# Patient Record
Sex: FEMALE | Race: BLACK OR AFRICAN AMERICAN | Employment: UNEMPLOYED | URBAN - METROPOLITAN AREA
[De-identification: names, ages, dates, MRNs, and addresses within clinical notes are randomized per-mention and may not be internally consistent; named-entity substitution may affect disease eponyms.]

---

## 2019-10-02 ENCOUNTER — OFFICE VISIT (OUTPATIENT)
Dept: FAMILY MEDICINE CLINIC | Age: 54
End: 2019-10-02

## 2019-10-02 ENCOUNTER — HOSPITAL ENCOUNTER (OUTPATIENT)
Dept: LAB | Age: 54
Discharge: HOME | End: 2019-10-02
Payer: MEDICAID

## 2019-10-02 VITALS
HEIGHT: 64 IN | SYSTOLIC BLOOD PRESSURE: 142 MMHG | OXYGEN SATURATION: 97 % | DIASTOLIC BLOOD PRESSURE: 79 MMHG | RESPIRATION RATE: 22 BRPM | BODY MASS INDEX: 40.29 KG/M2 | WEIGHT: 236 LBS | TEMPERATURE: 98.3 F | HEART RATE: 92 BPM

## 2019-10-02 DIAGNOSIS — G89.29 CHRONIC RIGHT SHOULDER PAIN: ICD-10-CM

## 2019-10-02 DIAGNOSIS — Z23 ENCOUNTER FOR IMMUNIZATION: ICD-10-CM

## 2019-10-02 DIAGNOSIS — I10 ESSENTIAL HYPERTENSION: ICD-10-CM

## 2019-10-02 DIAGNOSIS — R10.31 RIGHT LOWER QUADRANT ABDOMINAL PAIN: ICD-10-CM

## 2019-10-02 DIAGNOSIS — J45.20 MILD INTERMITTENT ASTHMA, UNSPECIFIED WHETHER COMPLICATED: ICD-10-CM

## 2019-10-02 DIAGNOSIS — F41.9 ANXIETY AND DEPRESSION: Primary | ICD-10-CM

## 2019-10-02 DIAGNOSIS — F32.A ANXIETY AND DEPRESSION: Primary | ICD-10-CM

## 2019-10-02 DIAGNOSIS — M25.511 CHRONIC RIGHT SHOULDER PAIN: ICD-10-CM

## 2019-10-02 LAB
BILIRUB UR QL STRIP: NEGATIVE
GLUCOSE UR-MCNC: NEGATIVE MG/DL
KETONES P FAST UR STRIP-MCNC: NEGATIVE MG/DL
PH UR STRIP: 6 [PH] (ref 4.6–8)
PROT UR QL STRIP: NEGATIVE
SP GR UR STRIP: 1.02 (ref 1–1.03)
UA UROBILINOGEN AMB POC: NORMAL (ref 0.2–1)
URINALYSIS CLARITY POC: CLEAR
URINALYSIS COLOR POC: YELLOW
URINE BLOOD POC: NORMAL
URINE LEUKOCYTES POC: NEGATIVE
URINE NITRITES POC: NEGATIVE

## 2019-10-02 PROCEDURE — 87086 URINE CULTURE/COLONY COUNT: CPT

## 2019-10-02 RX ORDER — HYDROGEN PEROXIDE 3 %
SOLUTION, NON-ORAL MISCELLANEOUS DAILY
COMMUNITY
End: 2020-01-20 | Stop reason: ALTCHOICE

## 2019-10-02 RX ORDER — SERTRALINE HYDROCHLORIDE 25 MG/1
25 TABLET, FILM COATED ORAL DAILY
Qty: 90 TAB | Refills: 1 | Status: CANCELLED | OUTPATIENT
Start: 2019-10-02

## 2019-10-02 RX ORDER — HYDROCHLOROTHIAZIDE 25 MG/1
25 TABLET ORAL DAILY
Qty: 90 TAB | Refills: 1 | Status: SHIPPED | OUTPATIENT
Start: 2019-10-02 | End: 2020-02-19

## 2019-10-02 RX ORDER — HYDROCHLOROTHIAZIDE 25 MG/1
25 TABLET ORAL DAILY
COMMUNITY
End: 2019-10-02 | Stop reason: SDUPTHER

## 2019-10-02 RX ORDER — ALBUTEROL SULFATE 90 UG/1
2 AEROSOL, METERED RESPIRATORY (INHALATION)
Qty: 3 INHALER | Refills: 2 | Status: SHIPPED | OUTPATIENT
Start: 2019-10-02 | End: 2021-03-16 | Stop reason: SDUPTHER

## 2019-10-02 RX ORDER — DULOXETIN HYDROCHLORIDE 30 MG/1
60 CAPSULE, DELAYED RELEASE ORAL DAILY
Qty: 60 CAP | Refills: 0 | Status: SHIPPED | OUTPATIENT
Start: 2019-10-02 | End: 2019-12-23 | Stop reason: SDUPTHER

## 2019-10-02 RX ORDER — ACETAMINOPHEN 500 MG
TABLET ORAL
COMMUNITY
End: 2020-02-17

## 2019-10-02 RX ORDER — DIPHENHYDRAMINE HCL 25 MG
25 TABLET ORAL
COMMUNITY
End: 2020-02-17

## 2019-10-02 NOTE — PROGRESS NOTES
1. Have you been to the ER, urgent care clinic or hospitalized since your last visit? YES. In ED on 8/1/19 for htn    2. Have you seen or consulted any other health care providers outside of the [address] [place] [address] since your last visit (Include any pap smears or colon screening)? NO      Do you have an Advanced Directive? NO    Would you like information on Advanced Directives? NO  Ankit Stout is a 48 y.o. female who presents for routine immunizations. She denies any symptoms , reactions or allergies that would exclude them from being immunized today. Risks and adverse reactions were discussed and the VIS was given to them. All questions were addressed. She was observed for 10 min post injection. There were no reactions observed.     Rosy Roberts LPN

## 2019-10-02 NOTE — PROGRESS NOTES
[facility]    CC: EOC for chronic disease management    HPI:     Anxiety and Depression:  -Issue for 20 years  -2/2 not being able to work due to pain, marriage issues, and son having legal problems  -Issue has been worsening  -On no psychiatric medicationi  -Not seeing a couselor or therapist  -Associated with crying, loss of interest, worrying, shaking, shortness of breath, and heart racing      Asthma:  -Issue since she was 24years old  -Started when she had her son  -Triggered by cold  -Reports that she rarely needs to use rescue inhaler      Right Shoulder Pain:  -Issue for 4 years  -Was seeing Dr. Tonya Coreas with orthopedic surgery for issue      HTN:  -Currently on no medication for issue  -Was previously on HCTZ  -Denies any side effects from the medication  -Checks BP at home.   No log brought in for review  -Reports SBP was in 139-127 on the medication  -Does not follow a regular exercise regimen  -Has been trying to lower her salt intake      ROS: Positive items marked in RED  CON: fever, chills  Cardiovascular: palpitations, CP  Resp: SOB, cough  GI: nausea, vomiting, diarrhea  : dysuria, hematuria      Past Medical History:   Diagnosis Date    Anxiety and depression     Asthma 11/3/2010    Chronic pain in right shoulder     Depression 10/19/2015    Eczema 10/19/2015    Head pain 4/28/2014    HTN (hypertension)     Syphilis in female 3/2/2015       Past Surgical History:   Procedure Laterality Date    ENDOSCOPY, COLON, DIAGNOSTIC      HX TUBAL LIGATION         Family History   Problem Relation Age of Onset    Cancer Mother     Breast Cancer Maternal Grandmother        Social History     Socioeconomic History    Marital status:      Spouse name: Not on file    Number of children: Not on file    Years of education: Not on file    Highest education level: Not on file   Tobacco Use    Smoking status: Current Every Day Smoker     Packs/day: 0.50     Types: Cigarettes  Smokeless tobacco: Never Used   Substance and Sexual Activity    Alcohol use: Yes    Drug use: No    Sexual activity: Yes       Allergies   Allergen Reactions    Aspirin Nausea and Vomiting    Prednisone Swelling         Current Outpatient Medications:     acetaminophen (TYLENOL EXTRA STRENGTH) 500 mg tablet, Take  by mouth every six (6) hours as needed for Pain., Disp: , Rfl:     esomeprazole (NEXIUM) 20 mg capsule, Take  by mouth daily. , Disp: , Rfl:     diphenhydrAMINE (BENADRYL ALLERGY) 25 mg tablet, Take 25 mg by mouth every six (6) hours as needed for Sleep., Disp: , Rfl:     hydroCHLOROthiazide (HYDRODIURIL) 25 mg tablet, Take 1 Tab by mouth daily. , Disp: 90 Tab, Rfl: 1    albuterol (PROVENTIL HFA, VENTOLIN HFA, PROAIR HFA) 90 mcg/actuation inhaler, Take 2 Puffs by inhalation every six (6) hours as needed for Wheezing., Disp: 3 Inhaler, Rfl: 2    DULoxetine (CYMBALTA) 30 mg capsule, Take 2 Caps by mouth daily. , Disp: 60 Cap, Rfl: 0    ALPRAZolam (XANAX) 0.5 mg tablet, Take 1 Tab by mouth two (2) times daily as needed for Anxiety. Max Daily Amount: 1 mg., Disp: 30 Tab, Rfl: 1    Physical Exam:      /79 (BP 1 Location: Right arm, BP Patient Position: Sitting)   Pulse 92   Temp 98.3 °F (36.8 °C) (Oral)   Resp 22   Ht 5' 4\" (1.626 m)   Wt 236 lb (107 kg)   SpO2 97%   BMI 40.51 kg/m²     General:  Obese habitus, NAD, conversant  Eyes: sclera clear bilaterally, no discharge noted, eyelids normal in appearance  HENT: NCAT  Lungs: CTAB, normal respiratory effort and rate  CV: RRR, no MRGs  ABD: soft, mildly tender to deep palpation in right lower quadrant, non-distended, normal bowel sounds  Skin: normal temperature, turgor, color, and texture  Psych: alert and oriented to person, place and situation, normal affect  Neuro: speech normal, moving all extremities, gait normal    Results for Marge Carrillo (MRN [id number]):   Ref.  Range 10/2/2019 15:54   Color (UA POC) Unknown Yellow Clarity (UA POC) Unknown Clear   Specific gravity (UA POC) Latest Ref Range: 1.001 - 1.035  1.025   pH (UA POC) Latest Ref Range: 4.6 - 8.0  6.0   Protein (UA POC) Latest Ref Range: Negative  Negative   Glucose (UA POC) Latest Ref Range: Negative  Negative   Ketones (UA POC) Latest Ref Range: Negative  Negative   Blood (UA POC) Latest Ref Range: Negative  1+   Bilirubin (UA POC) Latest Ref Range: Negative  Negative   Urobilinogen (UA POC) Latest Ref Range: 0.2 - 1  0.2 mg/dL   Nitrites (UA POC) Latest Ref Range: Negative  Negative   Leukocyte esterase (UA POC) Latest Ref Range: Negative  Negative         Assessment/Plan     Anxiety and Depression, inadequately controlled:  -Started on Cymbalta regimen  -Advised on importance of seeing a counselor/therapist  -Follow-up in 1 month      Hypertension, inadequately controlled:  -Started on HCTZ regimen  -CBC, CMP, and fasting lipid panel ordered  -Follow-up in 1 month      Chronic Right Shoulder Pain, inadequately controlled:  -Started on Cymbalta regimen  -Referred to orthopedist  -Follow-up in 1 month      Mild Intermittent Asthma:  -Will continue current albuterol regimen  -Follow-up in 1 month      Right Lower Quadrant Abdominal Pain:  -Suspect possible cystitis  -Urine culture ordered  -Plan to treat as indicated by culture results  -Follow-up in 1 month      Health Maintenance:  -Influenza vaccine administered at today's visit        Rhina Ramirez MD  10/2/2019, 3:20 PM

## 2019-10-04 LAB
BACTERIA SPEC CULT: NORMAL
SERVICE CMNT-IMP: NORMAL

## 2019-11-05 ENCOUNTER — HOSPITAL ENCOUNTER (OUTPATIENT)
Dept: LAB | Age: 54
Discharge: HOME | End: 2019-11-05
Payer: MEDICAID

## 2019-11-05 DIAGNOSIS — I10 ESSENTIAL HYPERTENSION: ICD-10-CM

## 2019-11-05 LAB
ALBUMIN SERPL-MCNC: 3.7 G/DL (ref 3.4–5)
ALBUMIN/GLOB SERPL: 1 {RATIO} (ref 0.8–1.7)
ALP SERPL-CCNC: 98 U/L (ref 45–117)
ALT SERPL-CCNC: 34 U/L (ref 13–56)
ANION GAP SERPL CALC-SCNC: 7 MMOL/L (ref 3–18)
AST SERPL-CCNC: 22 U/L (ref 10–38)
BILIRUB SERPL-MCNC: 0.3 MG/DL (ref 0.2–1)
BUN SERPL-MCNC: 13 MG/DL (ref 7–18)
BUN/CREAT SERPL: 12 (ref 12–20)
CALCIUM SERPL-MCNC: 9.8 MG/DL (ref 8.5–10.1)
CHLORIDE SERPL-SCNC: 103 MMOL/L (ref 100–111)
CHOLEST SERPL-MCNC: 201 MG/DL
CO2 SERPL-SCNC: 30 MMOL/L (ref 21–32)
CREAT SERPL-MCNC: 1.06 MG/DL (ref 0.6–1.3)
ERYTHROCYTE [DISTWIDTH] IN BLOOD BY AUTOMATED COUNT: 14.4 % (ref 11.6–14.5)
GLOBULIN SER CALC-MCNC: 3.7 G/DL (ref 2–4)
GLUCOSE SERPL-MCNC: 131 MG/DL (ref 74–99)
HCT VFR BLD AUTO: 44.2 % (ref 35–45)
HDLC SERPL-MCNC: 37 MG/DL (ref 40–60)
HDLC SERPL: 5.4 {RATIO} (ref 0–5)
HGB BLD-MCNC: 13.8 G/DL (ref 12–16)
LDLC SERPL CALC-MCNC: 141.4 MG/DL (ref 0–100)
LIPID PROFILE,FLP: ABNORMAL
MCH RBC QN AUTO: 26.5 PG (ref 24–34)
MCHC RBC AUTO-ENTMCNC: 31.2 G/DL (ref 31–37)
MCV RBC AUTO: 85 FL (ref 74–97)
PLATELET # BLD AUTO: 380 K/UL (ref 135–420)
PMV BLD AUTO: 10.1 FL (ref 9.2–11.8)
POTASSIUM SERPL-SCNC: 4.1 MMOL/L (ref 3.5–5.5)
PROT SERPL-MCNC: 7.4 G/DL (ref 6.4–8.2)
RBC # BLD AUTO: 5.2 M/UL (ref 4.2–5.3)
SODIUM SERPL-SCNC: 140 MMOL/L (ref 136–145)
TRIGL SERPL-MCNC: 113 MG/DL (ref ?–150)
VLDLC SERPL CALC-MCNC: 22.6 MG/DL
WBC # BLD AUTO: 13.8 K/UL (ref 4.6–13.2)

## 2019-11-05 PROCEDURE — 36415 COLL VENOUS BLD VENIPUNCTURE: CPT

## 2019-11-05 PROCEDURE — 80061 LIPID PANEL: CPT

## 2019-11-05 PROCEDURE — 80053 COMPREHEN METABOLIC PANEL: CPT

## 2019-11-05 PROCEDURE — 85027 COMPLETE CBC AUTOMATED: CPT

## 2019-11-18 ENCOUNTER — OFFICE VISIT (OUTPATIENT)
Dept: ORTHOPEDIC SURGERY | Facility: CLINIC | Age: 54
End: 2019-11-18

## 2019-11-18 VITALS
HEIGHT: 64 IN | SYSTOLIC BLOOD PRESSURE: 143 MMHG | HEART RATE: 96 BPM | RESPIRATION RATE: 16 BRPM | TEMPERATURE: 98.3 F | WEIGHT: 232 LBS | BODY MASS INDEX: 39.61 KG/M2 | DIASTOLIC BLOOD PRESSURE: 91 MMHG

## 2019-11-18 DIAGNOSIS — M54.2 CERVICAL PAIN: ICD-10-CM

## 2019-11-18 DIAGNOSIS — S43.431A TEAR OF RIGHT GLENOID LABRUM, INITIAL ENCOUNTER: ICD-10-CM

## 2019-11-18 DIAGNOSIS — G89.29 CHRONIC RIGHT SHOULDER PAIN: ICD-10-CM

## 2019-11-18 DIAGNOSIS — M54.12 CERVICAL RADICULOPATHY: Primary | ICD-10-CM

## 2019-11-18 DIAGNOSIS — M25.511 CHRONIC RIGHT SHOULDER PAIN: ICD-10-CM

## 2019-11-18 DIAGNOSIS — E66.01 SEVERE OBESITY (HCC): ICD-10-CM

## 2019-11-18 RX ORDER — BETAMETHASONE SODIUM PHOSPHATE AND BETAMETHASONE ACETATE 3; 3 MG/ML; MG/ML
6 INJECTION, SUSPENSION INTRA-ARTICULAR; INTRALESIONAL; INTRAMUSCULAR; SOFT TISSUE ONCE
Qty: 0.5 ML | Refills: 0
Start: 2019-11-18 | End: 2019-11-18
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
## 2019-12-03 ENCOUNTER — OFFICE VISIT (OUTPATIENT)
Dept: ORTHOPEDIC SURGERY | Facility: CLINIC | Age: 54
End: 2019-12-03

## 2019-12-03 VITALS
SYSTOLIC BLOOD PRESSURE: 125 MMHG | BODY MASS INDEX: 38.93 KG/M2 | DIASTOLIC BLOOD PRESSURE: 95 MMHG | WEIGHT: 228 LBS | TEMPERATURE: 98.4 F | OXYGEN SATURATION: 97 % | HEIGHT: 64 IN | HEART RATE: 101 BPM

## 2019-12-03 DIAGNOSIS — G89.29 CHRONIC RIGHT SHOULDER PAIN: ICD-10-CM

## 2019-12-03 DIAGNOSIS — M25.511 CHRONIC RIGHT SHOULDER PAIN: ICD-10-CM

## 2019-12-03 DIAGNOSIS — M75.101 ROTATOR CUFF SYNDROME, RIGHT: Primary | ICD-10-CM

## 2019-12-03 DIAGNOSIS — M54.2 NECK PAIN: ICD-10-CM

## 2019-12-03 NOTE — PROGRESS NOTES
Patient: Bertha Llanes                MRN: [id number]       SSN: [id number]  YOB: 1965        AGE: 48 y.o. SEX: female  Body mass index is 39.14 kg/m². PCP: Mo Nguyen MD  12/03/19    Chief Complaint: Right shoulder pain    HISTORY OF PRESENT ILLNESS:  Hector Martin is a 48year-old female who presents to the office today with right shoulder pain. She has had pain for several years. She saw Dr. Rosalind Sanchez several weeks ago, who gave her an injection in the shoulder, which did not give her much in the way of pain relief. She had an MRI in 2015, but no recent shoulder MRI. She did have recent shoulder x-rays. The pain is worse at night and worse when trying to raise her arm above shoulder height. Past Medical History:   Diagnosis Date    Anxiety and depression     Asthma 11/3/2010    Chronic pain in right shoulder     Depression 10/19/2015    Eczema 10/19/2015    Head pain 4/28/2014    HTN (hypertension)     Nicotine dependence     Syphilis in female 3/2/2015       Family History   Problem Relation Age of Onset    Breast Cancer Maternal Grandmother     Cancer Mother        Current Outpatient Medications   Medication Sig Dispense Refill    acetaminophen (TYLENOL EXTRA STRENGTH) 500 mg tablet Take  by mouth every six (6) hours as needed for Pain.  esomeprazole (NEXIUM) 20 mg capsule Take  by mouth daily.  diphenhydrAMINE (BENADRYL ALLERGY) 25 mg tablet Take 25 mg by mouth every six (6) hours as needed for Sleep.  hydroCHLOROthiazide (HYDRODIURIL) 25 mg tablet Take 1 Tab by mouth daily. 90 Tab 1    albuterol (PROVENTIL HFA, VENTOLIN HFA, PROAIR HFA) 90 mcg/actuation inhaler Take 2 Puffs by inhalation every six (6) hours as needed for Wheezing. 3 Inhaler 2    DULoxetine (CYMBALTA) 30 mg capsule Take 2 Caps by mouth daily. 60 Cap 0    ALPRAZolam (XANAX) 0.5 mg tablet Take 1 Tab by mouth two (2) times daily as needed for Anxiety.  Max Daily Amount: 1 mg. 30 Tab 1       Allergies   Allergen Reactions    Aspirin Nausea and Vomiting    Prednisone Swelling       Past Surgical History:   Procedure Laterality Date    ENDOSCOPY, COLON, DIAGNOSTIC      HX TUBAL LIGATION Bilateral        Social History     Socioeconomic History    Marital status:      Spouse name: Not on file    Number of children: Not on file    Years of education: Not on file    Highest education level: Not on file   Occupational History    Not on file   Social Needs    Financial resource strain: Not on file    Food insecurity:     Worry: Not on file     Inability: Not on file    Transportation needs:     Medical: Not on file     Non-medical: Not on file   Tobacco Use    Smoking status: Current Every Day Smoker     Packs/day: 0.50     Types: Cigarettes    Smokeless tobacco: Never Used   Substance and Sexual Activity    Alcohol use:  Yes    Drug use: No    Sexual activity: Yes     Partners: Male   Lifestyle    Physical activity:     Days per week: Not on file     Minutes per session: Not on file    Stress: Not on file   Relationships    Social connections:     Talks on phone: Not on file     Gets together: Not on file     Attends Latter day service: Not on file     Active member of club or organization: Not on file     Attends meetings of clubs or organizations: Not on file     Relationship status: Not on file    Intimate partner violence:     Fear of current or ex partner: Not on file     Emotionally abused: Not on file     Physically abused: Not on file     Forced sexual activity: Not on file   Other Topics Concern    Not on file   Social History Narrative    Not on file       REVIEW OF SYSTEMS:      CON: negative for recent weight loss/gain, fever, or chills  EYE: negative for double or blurry vision  ENT: negative for hoarseness  RS:   negative for cough, URI, SOB  CV:  negative for chest pain, palpitations  GI:    negative for blood in stool, nausea/vomiting  :  negative for blood in urine  MS: As per HPI  Other systems reviewed and noted below. PHYSICAL EXAMINATION:  Visit Vitals  BP (!) 125/95   Pulse (!) 101   Temp 98.4 °F (36.9 °C)   Ht 5' 4\" (1.626 m)   Wt 228 lb (103.4 kg)   SpO2 97%   BMI 39.14 kg/m²     Body mass index is 39.14 kg/m². GENERAL: Alert and oriented x3, in no acute distress, well-developed, well-nourished. HEENT: Normocephalic, atraumatic. RESP: Non labored breathing with equal chest rise on inspiration. CV: Well perfused extremities. No cyanosis or clubbing noted. ABDOMEN: Soft, non-tender, non-distended. PHYSICAL EXAM:  Physical exam of the right shoulder with decreased range of motion due to pain with forward flexion. She has pain with supraspinatus, infraspinatus and belly press testing. Mild pain with biceps stress testing. Pain with impingement testing. IMAGING:  X-rays of the right shoulder were reviewed and do not show any obvious bony abnormalities. ASSESSMENT AND PLAN:   Leslie Oneill is a 48year-old female with right shoulder pain. I recommended a new MRI, as her MRI is now almost [de-identified] years old. I ordered that today. I will see her back after that is completed to guide further treatment recommendations.              Electronically signed by: Landen Jackson MD

## 2019-12-23 DIAGNOSIS — F41.9 ANXIETY AND DEPRESSION: ICD-10-CM

## 2019-12-23 DIAGNOSIS — F32.A ANXIETY AND DEPRESSION: ICD-10-CM

## 2019-12-23 DIAGNOSIS — M25.511 CHRONIC RIGHT SHOULDER PAIN: ICD-10-CM

## 2019-12-23 DIAGNOSIS — G89.29 CHRONIC RIGHT SHOULDER PAIN: ICD-10-CM

## 2019-12-24 RX ORDER — DULOXETIN HYDROCHLORIDE 30 MG/1
60 CAPSULE, DELAYED RELEASE ORAL DAILY
Qty: 60 CAP | Refills: 0 | Status: SHIPPED | OUTPATIENT
Start: 2019-12-24 | End: 2020-01-08 | Stop reason: SINTOL

## 2019-12-27 ENCOUNTER — HOSPITAL ENCOUNTER (OUTPATIENT)
Dept: MRI IMAGING | Age: 54
Discharge: HOME | End: 2019-12-27
Attending: ORTHOPAEDIC SURGERY
Payer: MEDICAID

## 2019-12-27 DIAGNOSIS — M75.101 ROTATOR CUFF SYNDROME, RIGHT: ICD-10-CM

## 2019-12-27 PROCEDURE — 73221 MRI JOINT UPR EXTREM W/O DYE: CPT

## 2020-01-08 ENCOUNTER — OFFICE VISIT (OUTPATIENT)
Dept: FAMILY MEDICINE CLINIC | Age: 55
End: 2020-01-08

## 2020-01-08 VITALS
OXYGEN SATURATION: 94 % | HEART RATE: 91 BPM | TEMPERATURE: 96.8 F | BODY MASS INDEX: 38.76 KG/M2 | RESPIRATION RATE: 14 BRPM | WEIGHT: 227 LBS | SYSTOLIC BLOOD PRESSURE: 138 MMHG | HEIGHT: 64 IN | DIASTOLIC BLOOD PRESSURE: 84 MMHG

## 2020-01-08 DIAGNOSIS — F32.A ANXIETY AND DEPRESSION: Primary | ICD-10-CM

## 2020-01-08 DIAGNOSIS — F17.200 NICOTINE DEPENDENCE, UNCOMPLICATED, UNSPECIFIED NICOTINE PRODUCT TYPE: ICD-10-CM

## 2020-01-08 DIAGNOSIS — I10 ESSENTIAL HYPERTENSION WITH GOAL BLOOD PRESSURE LESS THAN 130/80: ICD-10-CM

## 2020-01-08 DIAGNOSIS — F41.9 ANXIETY AND DEPRESSION: Primary | ICD-10-CM

## 2020-01-08 DIAGNOSIS — E78.5 HYPERLIPIDEMIA, UNSPECIFIED HYPERLIPIDEMIA TYPE: ICD-10-CM

## 2020-01-08 DIAGNOSIS — M25.511 CHRONIC RIGHT SHOULDER PAIN: ICD-10-CM

## 2020-01-08 DIAGNOSIS — R73.01 ELEVATED FASTING BLOOD SUGAR: ICD-10-CM

## 2020-01-08 DIAGNOSIS — G89.29 CHRONIC RIGHT SHOULDER PAIN: ICD-10-CM

## 2020-01-08 DIAGNOSIS — L30.4 INTERTRIGO: ICD-10-CM

## 2020-01-08 RX ORDER — BUPROPION HYDROCHLORIDE 150 MG/1
150 TABLET, EXTENDED RELEASE ORAL 2 TIMES DAILY
Qty: 60 TAB | Refills: 5 | Status: SHIPPED | OUTPATIENT
Start: 2020-01-08 | End: 2020-03-04 | Stop reason: SDUPTHER

## 2020-01-08 RX ORDER — PRAVASTATIN SODIUM 40 MG/1
40 TABLET ORAL
Qty: 30 TAB | Refills: 5 | Status: SHIPPED | OUTPATIENT
Start: 2020-01-08 | End: 2020-06-29

## 2020-01-08 RX ORDER — LISINOPRIL 10 MG/1
10 TABLET ORAL DAILY
Qty: 30 TAB | Refills: 5 | Status: SHIPPED | OUTPATIENT
Start: 2020-01-08 | End: 2020-02-07 | Stop reason: SINTOL

## 2020-01-08 RX ORDER — KETOCONAZOLE 20 MG/G
CREAM TOPICAL
Qty: 30 G | Refills: 2 | Status: SHIPPED | OUTPATIENT
Start: 2020-01-08

## 2020-01-08 NOTE — PROGRESS NOTES
[facility]    CC: F/U for chronic disease management    HPI:     Anxiety and Depression:  -Tried Cymbalta and states she is unable to tolerate the medication  -States medication causes her to have sweats, constipation, loss of appetite, and hallucination  -Reports her mood is inadequately controlled  -Reports dealing with a lot of stress that is preventing her from sleeping       Hypertension:  -Got requested lab work  -Taking BP medication as prescribed.    -Denies any issues or side effects with BP medication.  -Has not been checking BP at home  -Diet is unchanged since last visit (Other than eating more fiber and fish)  -Has not been following a regular exercise regimen        Chronic Right Shoulder Pain:  -Saw orthopedic specialist on 11/18/2019  -Was given a steroid injection  -Diagnosed with a tear of right glenoid and was referred for possible surgical intervention  -Saw surgeon 12/3/2019 and a MRI was ordered  -Had MRI done on 12/27/2019  -Next appointment with surgeon is on 1/14/2020      Nicotine Dependence:  -States she is smoking 2/2 stress  -Reports she is currently smoking every day and issue has been worsening  -Currently smokes around 1/2 PPD  -Patient is interested in quitting and would like to try pharmacological therapy to aid in smoking cessation      ROS: Positive items marked in RED  CON: fever, chills  Cardiovascular: palpitations, CP  Resp: SOB, cough  GI: nausea, vomiting, diarrhea  : dysuria, hematuria      Past Medical History:   Diagnosis Date    Anxiety and depression     Asthma 11/3/2010    Chronic pain in right shoulder     Depression 10/19/2015    Eczema 10/19/2015    Head pain 4/28/2014    HTN (hypertension)     Nicotine dependence     Syphilis in female 3/2/2015       Past Surgical History:   Procedure Laterality Date    ENDOSCOPY, COLON, DIAGNOSTIC      HX TUBAL LIGATION Bilateral        Family History   Problem Relation Age of Onset    Breast Cancer Maternal Grandmother     Cancer Mother        Social History     Socioeconomic History    Marital status:      Spouse name: Not on file    Number of children: Not on file    Years of education: Not on file    Highest education level: Not on file   Tobacco Use    Smoking status: Current Every Day Smoker     Packs/day: 0.50     Types: Cigarettes    Smokeless tobacco: Never Used   Substance and Sexual Activity    Alcohol use: Yes    Drug use: No    Sexual activity: Yes     Partners: Male       Allergies   Allergen Reactions    Aspirin Nausea and Vomiting    Prednisone Swelling         Current Outpatient Medications:     DULoxetine (CYMBALTA) 30 mg capsule, Take 2 Caps by mouth daily. , Disp: 60 Cap, Rfl: 0    acetaminophen (TYLENOL EXTRA STRENGTH) 500 mg tablet, Take  by mouth every six (6) hours as needed for Pain., Disp: , Rfl:     esomeprazole (NEXIUM) 20 mg capsule, Take  by mouth daily. , Disp: , Rfl:     diphenhydrAMINE (BENADRYL ALLERGY) 25 mg tablet, Take 25 mg by mouth every six (6) hours as needed for Sleep., Disp: , Rfl:     hydroCHLOROthiazide (HYDRODIURIL) 25 mg tablet, Take 1 Tab by mouth daily. , Disp: 90 Tab, Rfl: 1    albuterol (PROVENTIL HFA, VENTOLIN HFA, PROAIR HFA) 90 mcg/actuation inhaler, Take 2 Puffs by inhalation every six (6) hours as needed for Wheezing., Disp: 3 Inhaler, Rfl: 2    ALPRAZolam (XANAX) 0.5 mg tablet, Take 1 Tab by mouth two (2) times daily as needed for Anxiety.  Max Daily Amount: 1 mg., Disp: 30 Tab, Rfl: 1    Physical Exam:      /84   Pulse 91   Temp 96.8 °F (36 °C) (Oral)   Resp 14   Ht 5' 4\" (1.626 m)   Wt 227 lb (103 kg)   SpO2 94%   BMI 38.96 kg/m²     General: Obese habitus, NAD, conversant  Eyes: sclera clear bilaterally, no discharge noted, eyelids normal in appearance  HENT: NCAT  Lungs: CTAB, normal respiratory effort and rate  CV: RRR, no MRGs  ABD: soft, non-tender, non-distended, normal bowel sounds  Skin: right-sided intertriginous rash noted, normal temperature and turgor. Exam chaperoned by Chelsea Farmer LPN. Psych: alert and oriented to person, place and situation, normal affect  Neuro: speech normal, moving all extremities, gait normal    Results for Dilcia Cruz (MRN [id number]):   Ref. Range 11/5/2019 08:44   WBC Latest Ref Range: 4.6 - 13.2 K/uL 13.8 (H)   RBC Latest Ref Range: 4.20 - 5.30 M/uL 5.20   HGB Latest Ref Range: 12.0 - 16.0 g/dL 13.8   HCT Latest Ref Range: 35.0 - 45.0 % 44.2   MCV Latest Ref Range: 74.0 - 97.0 FL 85.0   MCH Latest Ref Range: 24.0 - 34.0 PG 26.5   MCHC Latest Ref Range: 31.0 - 37.0 g/dL 31.2   RDW Latest Ref Range: 11.6 - 14.5 % 14.4   PLATELET Latest Ref Range: 135 - 420 K/uL 380   MPV Latest Ref Range: 9.2 - 11.8 FL 10.1   Sodium Latest Ref Range: 136 - 145 mmol/L 140   Potassium Latest Ref Range: 3.5 - 5.5 mmol/L 4.1   Chloride Latest Ref Range: 100 - 111 mmol/L 103   CO2 Latest Ref Range: 21 - 32 mmol/L 30   Anion gap Latest Ref Range: 3.0 - 18 mmol/L 7   Glucose Latest Ref Range: 74 - 99 mg/dL 131 (H)   BUN Latest Ref Range: 7.0 - 18 MG/DL 13   Creatinine Latest Ref Range: 0.6 - 1.3 MG/DL 1.06   BUN/Creatinine ratio Latest Ref Range: 12 - 20   12   Calcium Latest Ref Range: 8.5 - 10.1 MG/DL 9.8   GFR est non-AA Latest Ref Range: >60 ml/min/1.73m2 55 (L)   GFR est AA Latest Ref Range: >60 ml/min/1.73m2 >60   Bilirubin, total Latest Ref Range: 0.2 - 1.0 MG/DL 0.3   Protein, total Latest Ref Range: 6.4 - 8.2 g/dL 7.4   Albumin Latest Ref Range: 3.4 - 5.0 g/dL 3.7   Globulin Latest Ref Range: 2.0 - 4.0 g/dL 3.7   A-G Ratio Latest Ref Range: 0.8 - 1.7   1.0   ALT (SGPT) Latest Ref Range: 13 - 56 U/L 34   AST Latest Ref Range: 10 - 38 U/L 22   Alk.  phosphatase Latest Ref Range: 45 - 117 U/L 98   Triglyceride Latest Ref Range: <150 MG/   Cholesterol, total Latest Ref Range: <200 MG/ (H)   HDL Cholesterol Latest Ref Range: 40 - 60 MG/DL 37 (L)   CHOL/HDL Ratio Latest Ref Range: 0 - 5.0   5.4 (H)   VLDL, calculated Latest Units: MG/DL 22.6   LDL, calculated Latest Ref Range: 0 - 100 MG/.4 (H)       Assessment/Plan     Hypertension, inadequately controlled:  -10-year ASCVD risk of 18.4% (higher if she has diabetes)  -Counseled on ASCVD risk and AHA/ACC recommendations  -10 mg of lisinopril daily added to current BP medication regimen  -Urine microalbumin/creatinine ordered  -Follow-up in 1 month      Anxiety and Depression, inadequately controlled:  -Cymbalta officially discontinued  -Started on bupropion regimen  -Follow-up in 1 month      Hyperlipidemia, inadequately controlled:  -10-year ASCVD risk of 18.4% (higher if she has diabetes)  -Counseled on diagnosis, ASCVD risk, and AHA/ACC recommendations  -Started on 40 mg of pravastatin daily, per shared decision making  -Handout given on hyperlipidemia care  -Follow-up in 1 month      Elevated Fasting Blood Sugar:  -FBS in diabetic range  -Patient counseled on finding  -Hemoglobin A1c ordered  -Handout given hyperglycemia care  -Follow-up in 1 month      Nicotine Dependence, worsening:  -Counseled on smoking cessation  -Started on bupropion regimen  -Handout given on smoking cessation  -Follow-up in 1 month      Intertrigo:  -Likely secondary to Candida infection which may been possibly related to suspected diabetes  -Patient counseled on diagnosis  -Started on ketoconazole regimen  -Handout given on Candida dermatitis care  -Follow-up in 1 month      Chronic Right Shoulder Pain:  -Will defer management/evaluation to specialists  -Follow-up in 1 month        Sofía Kaye MD  1/8/2020 8:13 AM

## 2020-01-08 NOTE — PATIENT INSTRUCTIONS
Learning About High Cholesterol  What is high cholesterol? Cholesterol is a type of fat in your blood. It is needed for many body functions, such as making new cells. Cholesterol is made by your body. It also comes from food you eat. If you have too much cholesterol, it starts to build up in your arteries. This is called hardening of the arteries, or atherosclerosis. High cholesterol raises your risk of a heart attack and stroke. There are different types of cholesterol. LDL is the \"bad\" cholesterol. High LDL can raise your risk for heart disease, heart attack, and stroke. HDL is the \"good\" cholesterol. High HDL is linked with a lower risk for heart disease, heart attack, and stroke. Your cholesterol levels help your doctor find out your risk for having a heart attack or stroke. How can you prevent high cholesterol? A heart-healthy lifestyle can help you prevent high cholesterol. This lifestyle helps lower your risk for a heart attack and stroke. · Eat heart-healthy foods. ? Eat fruits, vegetables, whole grains (like oatmeal), dried beans and peas, nuts and seeds, soy products (like tofu), and fat-free or low-fat dairy products. ? Replace butter, margarine, and hydrogenated or partially hydrogenated oils with olive and canola oils. (Canola oil margarine without trans fat is fine.)  ? Replace red meat with fish, poultry, and soy protein (like tofu). ? Limit processed and packaged foods like chips, crackers, and cookies. · Be active. Exercise can improve your cholesterol level. Get at least 30 minutes of exercise on most days of the week. Walking is a good choice. You also may want to do other activities, such as running, swimming, cycling, or playing tennis or team sports. · Stay at a healthy weight. Lose weight if you need to. · Don't smoke. If you need help quitting, talk to your doctor about stop-smoking programs and medicines. These can increase your chances of quitting for good.   How is high cholesterol treated? The goal of treatment is to reduce your chances of having a heart attack or stroke. The goal is not to lower your cholesterol numbers only. · You may make lifestyle changes, such as eating healthy foods, not smoking, losing weight, and being more active. · You may have to take medicine. Follow-up care is a key part of your treatment and safety. Be sure to make and go to all appointments, and call your doctor if you are having problems. It's also a good idea to know your test results and keep a list of the medicines you take. Where can you learn more? Go to http://batsheva-willie.info/. Enter C459 in the search box to learn more about \"Learning About High Cholesterol. \"  Current as of: April 9, 2019  Content Version: 12.2  © 2249-3695 [employer]. Care instructions adapted under license by [employer] (which disclaims liability or warranty for this information). If you have questions about a medical condition or this instruction, always ask your healthcare professional. [address] any warranty or liability for your use of this information. Learning About High Blood Sugar  What is high blood sugar? Your body turns the food you eat into glucose (sugar), which it uses for energy. But if your body isn't able to use the sugar right away, it can build up in your blood and lead to high blood sugar. When the amount of sugar in your blood stays too high for too much of the time, you may have diabetes. Diabetes is a disease that can cause serious health problems. The good news is that lifestyle changes may help you get your blood sugar back to normal and avoid or delay diabetes. What causes high blood sugar? Sugar (glucose) can build up in your blood if you:  · Are overweight. · Have a family history of diabetes. · Take certain medicines, such as steroids. What are the symptoms?   Having high blood sugar may not cause any symptoms at all. Or it may make you feel very thirsty or very hungry. You may also urinate more often than usual, have blurry vision, or lose weight without trying. How is high blood sugar treated? You can take steps to lower your blood sugar level if you understand what makes it get higher. Your doctor may want you to learn how to test your blood sugar level at home. Then you can see how illness, stress, or different kinds of food or medicine raise or lower your blood sugar level. Other tests may be needed to see if you have diabetes. How can you prevent high blood sugar? · Watch your weight. If you're overweight, losing just a small amount of weight may help. Reducing fat around your waist is most important. · Limit the amount of calories, sweets, and unhealthy fat you eat. Ask your doctor if a dietitian can help you. A registered dietitian can help you create meal plans that fit your lifestyle. · Get at least 30 minutes of exercise on most days of the week. Exercise helps control your blood sugar. It also helps you maintain a healthy weight. Walking is a good choice. You also may want to do other activities, such as running, swimming, cycling, or playing tennis or team sports. · If your doctor prescribed medicines, take them exactly as prescribed. Call your doctor if you think you are having a problem with your medicine. You will get more details on the specific medicines your doctor prescribes. Follow-up care is a key part of your treatment and safety. Be sure to make and go to all appointments, and call your doctor if you are having problems. It's also a good idea to know your test results and keep a list of the medicines you take. Where can you learn more? Go to http://batsheva-willie.info/. Enter O108 in the search box to learn more about \"Learning About High Blood Sugar. \"  Current as of: April 16, 2019  Content Version: 12.2  © 2874-2677 [employer], [facility].  Care instructions adapted under license by [employer] (which disclaims liability or warranty for this information). If you have questions about a medical condition or this instruction, always ask your healthcare professional. [address] any warranty or liability for your use of this information. Stopping Smoking: Care Instructions  Your Care Instructions  Cigarette smokers crave the nicotine in cigarettes. Giving it up is much harder than simply changing a habit. Your body has to stop craving the nicotine. It is hard to quit, but you can do it. There are many tools that people use to quit smoking. You may find that combining tools works best for you. There are several steps to quitting. First you get ready to quit. Then you get support to help you. After that, you learn new skills and behaviors to become a nonsmoker. For many people, a necessary step is getting and using medicine. Your doctor will help you set up the plan that best meets your needs. You may want to attend a smoking cessation program to help you quit smoking. When you choose a program, look for one that has proven success. Ask your doctor for ideas. You will greatly increase your chances of success if you take medicine as well as get counseling or join a cessation program.  Some of the changes you feel when you first quit tobacco are uncomfortable. Your body will miss the nicotine at first, and you may feel short-tempered and grumpy. You may have trouble sleeping or concentrating. Medicine can help you deal with these symptoms. You may struggle with changing your smoking habits and rituals. The last step is the tricky one: Be prepared for the smoking urge to continue for a time. This is a lot to deal with, but keep at it. You will feel better. Follow-up care is a key part of your treatment and safety. Be sure to make and go to all appointments, and call your doctor if you are having problems.  It's also a good idea to know your test results and keep a list of the medicines you take. How can you care for yourself at home? · Ask your family, friends, and coworkers for support. You have a better chance of quitting if you have help and support. · Join a support group, such as Nicotine Anonymous, for people who are trying to quit smoking. · Consider signing up for a smoking cessation program, such as the American Lung Association's Freedom from Smoking program.  · Get text messaging support. Go to the website at www.smokefree. gov to sign up for the [facility] program.  · Set a quit date. Pick your date carefully so that it is not right in the middle of a big deadline or stressful time. Once you quit, do not even take a puff. Get rid of all ashtrays and lighters after your last cigarette. Clean your house and your clothes so that they do not smell of smoke. · Learn how to be a nonsmoker. Think about ways you can avoid those things that make you reach for a cigarette. ? Avoid situations that put you at greatest risk for smoking. For some people, it is hard to have a drink with friends without smoking. For others, they might skip a coffee break with coworkers who smoke. ? Change your daily routine. Take a different route to work or eat a meal in a different place. · Cut down on stress. Calm yourself or release tension by doing an activity you enjoy, such as reading a book, taking a hot bath, or gardening. · Talk to your doctor or pharmacist about nicotine replacement therapy, which replaces the nicotine in your body. You still get nicotine but you do not use tobacco. Nicotine replacement products help you slowly reduce the amount of nicotine you need. These products come in several forms, many of them available over-the-counter:  ? Nicotine patches  ? Nicotine gum and lozenges  ? Nicotine inhaler  · Ask your doctor about bupropion (Wellbutrin) or varenicline (Chantix), which are prescription medicines.  They do not contain nicotine. They help you by reducing withdrawal symptoms, such as stress and anxiety. · Some people find hypnosis, acupuncture, and massage helpful for ending the smoking habit. · Eat a healthy diet and get regular exercise. Having healthy habits will help your body move past its craving for nicotine. · Be prepared to keep trying. Most people are not successful the first few times they try to quit. Do not get mad at yourself if you smoke again. Make a list of things you learned and think about when you want to try again, such as next week, next month, or next year. Where can you learn more? Go to http://batshevaSisteerwillie.info/. Enter P022 in the search box to learn more about \"Stopping Smoking: Care Instructions. \"  Current as of: September 26, 2018  Content Version: 12.2  © 0741-1455 [employer]. Care instructions adapted under license by [employer] (which disclaims liability or warranty for this information). If you have questions about a medical condition or this instruction, always ask your healthcare professional. [place] [address] any warranty or liability for your use of this information. Yeast Skin Infection: Care Instructions  Your Care Instructions    Yeast normally lives on your skin. Sometimes too much yeast can overgrow in certain areas of the skin and cause an infection. The infection causes red, scaly, moist patches on your skin that may itch. Common areas for skin yeast infections are skin folds under the breasts or belly area. The warm and moist areas in the skin folds can make it easier for yeast to overgrow. Yeast infections also can be found on other parts of the body such as the groin or armpits. You will probably get a cream or ointment that contains an antifungal medicine. Examples of these are miconazole and clotrimazole. You put it on your skin to treat the infection.  Your doctor may give you a prescription for the cream or ointment. Or you may be able to buy it without a prescription at most drugstores. If the infection is severe, the doctor will prescribe antifungal pills. A yeast infection usually goes away after about a week of treatment. But it's important to use the medicine for as long as your doctor tells you to. Follow-up care is a key part of your treatment and safety. Be sure to make and go to all appointments, and call your doctor if you are having problems. It's also a good idea to know your test results and keep a list of the medicines you take. How can you care for yourself at home? · Be safe with medicines. Take your medicines exactly as prescribed. Call your doctor if you think you are having a problem with your medicine. · Keep your skin clean and dry. Your doctor may suggest using powder that contains an antifungal medicine in the skin folds. · Wear loose clothing. When should you call for help? Call your doctor now or seek immediate medical care if:    · You have symptoms of infection, such as:  ? Increased pain, swelling, warmth, or redness. ? Red streaks leading from the area. ? Pus draining from the area. ? A fever.    Watch closely for changes in your health, and be sure to contact your doctor if:    · You do not get better as expected. Where can you learn more? Go to http://batsheva-willie.info/. Enter J740 in the search box to learn more about \"Yeast Skin Infection: Care Instructions. \"  Current as of: April 1, 2019  Content Version: 12.2  © 8362-6521 [employer]. Care instructions adapted under license by [employer] (which disclaims liability or warranty for this information). If you have questions about a medical condition or this instruction, always ask your healthcare professional. [address] any warranty or liability for your use of this information.

## 2020-01-08 NOTE — PROGRESS NOTES
1. Have you been to the ER, urgent care clinic since your last visit? Hospitalized since your last visit? Yes When: 12-29-19 [facility] for hemorrhoids    2. Have you seen or consulted any other health care providers outside of the [address] [place] [address] since your last visit? Include any pap smears or colon screening. No       Patient states Duloxitine 60 mg daily is causing severe constipation and hemorrhoids are very inflamed.  Patient states she is having blood in stool with every bowel movement

## 2020-01-17 ENCOUNTER — OFFICE VISIT (OUTPATIENT)
Dept: FAMILY MEDICINE CLINIC | Age: 55
End: 2020-01-17

## 2020-01-17 VITALS
SYSTOLIC BLOOD PRESSURE: 128 MMHG | HEIGHT: 64 IN | OXYGEN SATURATION: 91 % | DIASTOLIC BLOOD PRESSURE: 82 MMHG | WEIGHT: 227 LBS | RESPIRATION RATE: 16 BRPM | HEART RATE: 84 BPM | TEMPERATURE: 97.6 F | BODY MASS INDEX: 38.76 KG/M2

## 2020-01-17 DIAGNOSIS — S49.91XA INJURY OF RIGHT SHOULDER, INITIAL ENCOUNTER: ICD-10-CM

## 2020-01-17 DIAGNOSIS — K21.9 GASTROESOPHAGEAL REFLUX DISEASE, ESOPHAGITIS PRESENCE NOT SPECIFIED: ICD-10-CM
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
## 2020-01-17 DIAGNOSIS — G47.00 INSOMNIA, UNSPECIFIED TYPE: ICD-10-CM

## 2020-01-17 DIAGNOSIS — F32.A ANXIETY AND DEPRESSION: Primary | ICD-10-CM

## 2020-01-17 DIAGNOSIS — F41.9 ANXIETY AND DEPRESSION: Primary | ICD-10-CM

## 2020-01-17 RX ORDER — KETOROLAC TROMETHAMINE 30 MG/ML
60 INJECTION, SOLUTION INTRAMUSCULAR; INTRAVENOUS ONCE
Qty: 1 VIAL | Refills: 0
Start: 2020-01-17 | End: 2020-01-17

## 2020-01-17 RX ORDER — TRAZODONE HYDROCHLORIDE 100 MG/1
100 TABLET ORAL
Qty: 30 TAB | Refills: 5 | Status: SHIPPED | OUTPATIENT
Start: 2020-01-17 | End: 2020-02-20 | Stop reason: SDUPTHER

## 2020-01-17 NOTE — LETTER
NOTIFICATION RETURN TO WORK / SCHOOL 
 
1/17/2020 9:13 AM 
 
Ms. Jamal Padgett [address] [address] To Whom It May Concern: 
 
Jamal Padgett is currently under the care of Marcelo Wood and is requiring the care of her  Omer Moreno for an injury she has sustained. Omer Moreno will return to work/school on: 1/18/2020 If there are questions or concerns please have the patient contact our office. Sincerely, Rakan Massey MD

## 2020-01-17 NOTE — PROGRESS NOTES
1. Have you been to the ER, urgent care clinic since your last visit? Hospitalized since your last visit? Yes When: 1-17-20 [facility] for right shoulder pain     2. Have you seen or consulted any other health care providers outside of the [address] [place] [address] since your last visit? Include any pap smears or colon screening. No    After obtaining consent, and per orders of Dr. Wayne Caraballo, injection of Ketorolac Tromethamine given by Naya Arora LPN. Patient instructed to remain in clinic for 20 minutes afterwards, and to report any adverse reaction to me immediately.

## 2020-01-17 NOTE — PROGRESS NOTES
[facility]    CC: Shoulder Injury    HPI:     Shoulder Injury:  -Injury was of the right shoulder  -Occurred last night  -Tripped over a rug and caught herself with her right arm  -Reports associated pain and swelling of right shoulder  -Went to ED for evaluation and was cleared of having any fracture  -Patient states she has history of falling a lot  -States rolling out of bed while asleep a few days ago and injuring her lip   -Right shoulder pain is constant and keeps her up at night  -Endorses being depressed and having crying spells      GERD:  -Taking Nexium for issue  -Denies any side effects or issues with medication  -Reports medication helps with symptoms      ROS: Positive items marked in RED  CON: fever, chills  Cardiovascular: palpitations, CP  Resp: SOB, cough  GI: nausea, vomiting, diarrhea  : dysuria, hematuria      Past Medical History:   Diagnosis Date    Anxiety and depression     Asthma 11/3/2010    Chronic pain in right shoulder     Depression 10/19/2015    Eczema 10/19/2015    Head pain 4/28/2014    HTN (hypertension)     Nicotine dependence     Syphilis in female 3/2/2015       Past Surgical History:   Procedure Laterality Date    ENDOSCOPY, COLON, DIAGNOSTIC      HX TUBAL LIGATION Bilateral        Family History   Problem Relation Age of Onset    Breast Cancer Maternal Grandmother     Cancer Mother        Social History     Socioeconomic History    Marital status:      Spouse name: Not on file    Number of children: Not on file    Years of education: Not on file    Highest education level: Not on file   Tobacco Use    Smoking status: Current Every Day Smoker     Packs/day: 0.50     Types: Cigarettes    Smokeless tobacco: Never Used   Substance and Sexual Activity    Alcohol use:  Yes    Drug use: No    Sexual activity: Yes     Partners: Male       Allergies   Allergen Reactions    Aspirin Nausea and Vomiting    Prednisone Swelling Current Outpatient Medications:     buPROPion SR (WELLBUTRIN SR) 150 mg SR tablet, Take 1 Tab by mouth two (2) times a day., Disp: 60 Tab, Rfl: 5    pravastatin (PRAVACHOL) 40 mg tablet, Take 1 Tab by mouth nightly., Disp: 30 Tab, Rfl: 5    lisinopril (PRINIVIL, ZESTRIL) 10 mg tablet, Take 1 Tab by mouth daily. , Disp: 30 Tab, Rfl: 5    ketoconazole (NIZORAL) 2 % topical cream, Apply once daily to cover the affected and immediate surrounding area for 2 weeks, Disp: 30 g, Rfl: 2    esomeprazole (NEXIUM) 20 mg capsule, Take  by mouth daily. , Disp: , Rfl:     hydroCHLOROthiazide (HYDRODIURIL) 25 mg tablet, Take 1 Tab by mouth daily. , Disp: 90 Tab, Rfl: 1    acetaminophen (TYLENOL EXTRA STRENGTH) 500 mg tablet, Take  by mouth every six (6) hours as needed for Pain., Disp: , Rfl:     diphenhydrAMINE (BENADRYL ALLERGY) 25 mg tablet, Take 25 mg by mouth every six (6) hours as needed for Sleep., Disp: , Rfl:     albuterol (PROVENTIL HFA, VENTOLIN HFA, PROAIR HFA) 90 mcg/actuation inhaler, Take 2 Puffs by inhalation every six (6) hours as needed for Wheezing., Disp: 3 Inhaler, Rfl: 2    ALPRAZolam (XANAX) 0.5 mg tablet, Take 1 Tab by mouth two (2) times daily as needed for Anxiety. Max Daily Amount: 1 mg., Disp: 30 Tab, Rfl: 1    Physical Exam:      /82   Pulse 84   Temp 97.6 °F (36.4 °C) (Oral)   Resp 16   Ht 5' 4\" (1.626 m)   Wt 227 lb (103 kg)   SpO2 91%   BMI 38.96 kg/m²     General: obese habitus, NAD, conversant  Eyes: sclera clear bilaterally, no discharge noted, eyelids normal in appearance  HENT: NCAT  Lungs: CTAB, normal respiratory effort and rate  CV: RRR, no MRGs  ABD: soft, non-tender, non-distended, normal bowel sounds  Ext: right arm in sling, any attempts to move arm elicits pain.   Skin: normal temperature, turgor, color, and texture  Psych: alert and oriented to person, place and situation, normal affect  Neuro: speech normal, moving all extremities, gait normal      Shoulder Complete Right (1/17/2020):  Normal RIGHT shoulder series      Assessment/Plan     Right Shoulder Injury:  -Toradol injection given for pain  -Will defer further management/evaluation to orthopedist  -Follow-up as needed if symptoms worsen or fail to improve      Fall:  -Patient with history of frequent falls  -Counseled on importance of follow-up prevention  -Handouts given on fall prevention and getting up safely from a fall  -Follow-up as needed if symptoms worsen or fail to improve      Depression and Anxiety with Insomnia, inadequately controlled:  -Likely exacerbating pain perception  -Started on trazodone regimen   -Follow-up at already scheduled appointment      GERD:  -Discontinued prior Nexium regimen  -Started on trial of famotidine  -Follow-up at already scheduled appointment        Aundrea Koehler MD  1/17/2020, 8:49 AM

## 2020-01-17 NOTE — PATIENT INSTRUCTIONS
Preventing Falls: Care Instructions  Your Care Instructions    Getting around your home safely can be a challenge if you have injuries or health problems that make it easy for you to fall. Loose rugs and furniture in walkways are among the dangers for many older people who have problems walking or who have poor eyesight. People who have conditions such as arthritis, osteoporosis, or dementia also have to be careful not to fall. You can make your home safer with a few simple measures. Follow-up care is a key part of your treatment and safety. Be sure to make and go to all appointments, and call your doctor if you are having problems. It's also a good idea to know your test results and keep a list of the medicines you take. How can you care for yourself at home? Taking care of yourself  · You may get dizzy if you do not drink enough water. To prevent dehydration, drink plenty of fluids, enough so that your urine is light yellow or clear like water. Choose water and other caffeine-free clear liquids. If you have kidney, heart, or liver disease and have to limit fluids, talk with your doctor before you increase the amount of fluids you drink. · Exercise regularly to improve your strength, muscle tone, and balance. Walk if you can. Swimming may be a good choice if you cannot walk easily. · Have your vision and hearing checked each year or any time you notice a change. If you have trouble seeing and hearing, you might not be able to avoid objects and could lose your balance. · Know the side effects of the medicines you take. Ask your doctor or pharmacist whether the medicines you take can affect your balance. Sleeping pills or sedatives can affect your balance. · Limit the amount of alcohol you drink. Alcohol can impair your balance and other senses. · Ask your doctor whether calluses or corns on your feet need to be removed.  If you wear loose-fitting shoes because of calluses or corns, you can lose your balance and fall. · Talk to your doctor if you have numbness in your feet. Preventing falls at home  · Remove raised doorway thresholds, throw rugs, and clutter. Repair loose carpet or raised areas in the floor. · Move furniture and electrical cords to keep them out of walking paths. · Use nonskid floor wax, and wipe up spills right away, especially on ceramic tile floors. · If you use a walker or cane, put rubber tips on it. If you use crutches, clean the bottoms of them regularly with an abrasive pad, such as steel wool. · Keep your house well lit, especially [facility], and outside walkways. Use night-lights in areas such as hallways and bathrooms. Add extra light switches or use remote switches (such as switches that go on or off when you clap your hands) to make it easier to turn lights on if you have to get up during the night. · Install sturdy handrails on stairways. · Move items in your cabinets so that the things you use a lot are on the lower shelves (about waist level). · Keep a cordless phone and a flashlight with new batteries by your bed. If possible, put a phone in each of the main rooms of your house, or carry a cell phone in case you fall and cannot reach a phone. Or, you can wear a device around your neck or wrist. You push a button that sends a signal for help. · Wear low-heeled shoes that fit well and give your feet good support. Use footwear with nonskid soles. Check the heels and soles of your shoes for wear. Repair or replace worn heels or soles. · Do not wear socks without shoes on wood floors. · Walk on the grass when the sidewalks are slippery. If you live in an area that gets snow and ice in the winter, sprinkle salt on slippery steps and sidewalks. Preventing falls in the bath  · Install grab bars and nonskid mats inside and outside your shower or tub and near the toilet and sinks. · Use shower chairs and bath benches.   · Use a hand-held shower head that will allow you to sit while showering. · Get into a tub or shower by putting the weaker leg in first. Get out of a tub or shower with your strong side first.  · Repair loose toilet seats and consider installing a raised toilet seat to make getting on and off the toilet easier. · Keep your bathroom door unlocked while you are in the shower. Where can you learn more? Go to http://batsheva-willie.info/. Enter [id number] [id number] in the search box to learn more about \"Preventing Falls: Care Instructions. \"  Current as of: November 7, 2018  Content Version: 12.2  © 9858-5433 [employer]. Care instructions adapted under license by [employer] (which disclaims liability or warranty for this information). If you have questions about a medical condition or this instruction, always ask your healthcare professional. [place] [address] any warranty or liability for your use of this information. How to Get Up Safely After a Fall: Care Instructions  Your Care Instructions    If you have injuries, health problems, or other reasons that may make it easy for you to fall at home, it is a good idea to learn how to get up safely after a fall. Learning how to get up correctly can help you avoid making an injury worse. Also, knowing what to do if you cannot get up can help you stay safe until help arrives. Follow-up care is a key part of your treatment and safety. Be sure to make and go to all appointments, and call your doctor if you are having problems. It's also a good idea to know your test results and keep a list of the medicines you take. How can you care for yourself after a fall? If you think you can get up  First lie still for a few minutes and think about how you feel. If your body feels okay and you think you can get up safely, follow the rest of the steps below:  1. Look for a chair or other piece of furniture that is close to you.   2. Roll onto your side and rest. Roll by turning your head in the direction you want to roll, move your shoulder and arm, then hip and leg in the same direction. 3. Lie still for a moment to let your blood pressure adjust.  4. Slowly push your upper body up, lift your head, and take a moment to rest.  5. Slowly get up on your hands and knees, and crawl to the chair or other stable piece of furniture. 6. Put your hands on the chair. 7. Move one foot forward, and place it flat on the floor. Your other leg should be bent with the knee on the floor. 8. Rise slowly, turn your body, and sit in the chair. Stay seated for a bit and think about how you feel. Call for help. Even if you feel okay, let someone know what happened to you. You might not know that you have a serious injury. If you cannot get up  1. If you think you are injured after a fall or you cannot get up, try not to panic. 2. Call out for help. 3. If you have a phone within reach or you have an emergency call device, use it to call for help. 4. If you do not have a phone within reach, try to slide yourself toward it. If you cannot get to the phone, try to slide toward a door or window or a place where you think you can be heard. 5. White or use an object to make noise so someone might hear you. 6. If you can reach something that you can use for a pillow, place it under your head. Try to stay warm by covering yourself with a blanket or clothing while you wait for help. When should you call for help? Call 911 anytime you think you may need emergency care. For example, call if:    · You passed out (lost consciousness).     · You cannot get up after a fall.     · You have severe pain.    Call your doctor now or seek immediate medical care if:    · You have new or worse pain.     · You are dizzy or lightheaded.     · You hit your head.    Watch closely for changes in your health, and be sure to contact your doctor if:    · You do not get better as expected. Where can you learn more?   Go to http://batsheva-willie.info/. Enter K237 in the search box to learn more about \"How to Get Up Safely After a Fall: Care Instructions. \"  Current as of: November 7, 2018  Content Version: 12.2  © 1123-8608 [employer]. Care instructions adapted under license by [employer] (which disclaims liability or warranty for this information). If you have questions about a medical condition or this instruction, always ask your healthcare professional. [address] any warranty or liability for your use of this information.

## 2020-01-20 ENCOUNTER — TELEPHONE (OUTPATIENT)
Dept: FAMILY MEDICINE CLINIC | Age: 55
End: 2020-01-20

## 2020-01-20 RX ORDER — FAMOTIDINE 20 MG/1
20 TABLET, FILM COATED ORAL 2 TIMES DAILY
Qty: 60 TAB | Refills: 5 | Status: ON HOLD | OUTPATIENT
Start: 2020-01-20 | End: 2020-03-03

## 2020-01-20 NOTE — TELEPHONE ENCOUNTER
Patient called and stated that Dr. Monet Kennedy was to put in a medication for her chronic cough and an anti acid and she said she called her pharmacy and they were not put in. Patient did not now the name of medications.

## 2020-01-20 NOTE — TELEPHONE ENCOUNTER
Per Dr. Meme Vargas there was no discussion about acid reflux medication or discussion of a chronic cough. Issues will be addressed at next visit or patient can schedule a follow up sooner as more information will be needed on cough and acid reflux before medications can be prescribed.

## 2020-01-22 ENCOUNTER — OFFICE VISIT (OUTPATIENT)
Dept: FAMILY MEDICINE CLINIC | Age: 55
End: 2020-01-22

## 2020-01-22 VITALS
HEIGHT: 64 IN | SYSTOLIC BLOOD PRESSURE: 126 MMHG | BODY MASS INDEX: 38.76 KG/M2 | HEART RATE: 89 BPM | TEMPERATURE: 97.8 F | OXYGEN SATURATION: 94 % | WEIGHT: 227 LBS | RESPIRATION RATE: 14 BRPM | DIASTOLIC BLOOD PRESSURE: 96 MMHG

## 2020-01-22 DIAGNOSIS — J31.0 RHINITIS, UNSPECIFIED TYPE: ICD-10-CM

## 2020-01-22 DIAGNOSIS — J31.0 RHINITIS, UNSPECIFIED TYPE: Primary | ICD-10-CM

## 2020-01-22 RX ORDER — FLUTICASONE PROPIONATE 50 MCG
2 SPRAY, SUSPENSION (ML) NASAL DAILY
Qty: 1 BOTTLE | Refills: 5 | Status: SHIPPED | OUTPATIENT
Start: 2020-01-22 | End: 2020-02-17

## 2020-01-22 RX ORDER — AZELASTINE 1 MG/ML
1 SPRAY, METERED NASAL 2 TIMES DAILY
Qty: 1 BOTTLE | Refills: 5 | Status: SHIPPED | OUTPATIENT
Start: 2020-01-22 | End: 2020-02-17

## 2020-01-22 NOTE — PATIENT INSTRUCTIONS
Rhinitis: Care Instructions  Your Care Instructions  Rhinitis is swelling and irritation in the nose. Allergies and infections are often the cause. Your nose may run or feel stuffy. Other symptoms are itchy and sore eyes, ears, throat, and mouth. If allergies are the cause, your doctor may do tests to find out what you are allergic to. You may be able to stop symptoms if you avoid the things that cause them. Your doctor may suggest or prescribe medicine to ease your symptoms. Follow-up care is a key part of your treatment and safety. Be sure to make and go to all appointments, and call your doctor if you are having problems. It's also a good idea to know your test results and keep a list of the medicines you take. How can you care for yourself at home? · If your rhinitis is caused by allergies, try to find out what sets off (triggers) your symptoms. Take steps to avoid these triggers. ? Avoid yard work. It can stir up both pollen and mold. ? Do not smoke or allow others to smoke around you. If you need help quitting, talk to your doctor about stop-smoking programs and medicines. These can increase your chances of quitting for good. ? Do not use aerosol sprays, cleaning products, or perfumes. ? If pollen is one of your triggers, close your house and car windows during blooming season. ? Clean your house often to control dust.  ? Keep pets outside. · If your doctor recommends over-the-counter medicines to relieve symptoms, take your medicines exactly as prescribed. Call your doctor if you think you are having a problem with your medicine. · Use saline (saltwater) nasal washes to help keep your nasal passages open and wash out mucus and bacteria. You can buy saline nose drops at a grocery store or drugstore. Or you can make your own at home by adding 1 teaspoon of salt and 1 teaspoon of baking soda to 2 cups of distilled water.  If you make your own, fill a bulb syringe with the solution, insert the tip into your nostril, and squeeze gently. Avril Andersen your nose. When should you call for help? Call your doctor now or seek immediate medical care if:    · You are having trouble breathing.    Watch closely for changes in your health, and be sure to contact your doctor if:    · Mucus from your nose gets thicker (like pus) or has new blood in it.     · You have new or worse symptoms.     · You do not get better as expected. Where can you learn more? Go to http://batsheva-willie.info/. Enter M030 in the search box to learn more about \"Rhinitis: Care Instructions. \"  Current as of: October 21, 2018  Content Version: 12.2  © 2365-5083 [email or web address]. Care instructions adapted under license by [employer] (which disclaims liability or warranty for this information). If you have questions about a medical condition or this instruction, always ask your healthcare professional. [address] any warranty or liability for your use of this information. Managing Your Allergies: Care Instructions  Your Care Instructions    Managing your allergies is an important part of staying healthy. Your doctor will help you find out what may be causing the allergies. Common causes of allergy symptoms are house dust and dust mites, animal dander, mold, and pollen. As soon as you know what triggers your symptoms, try to reduce your exposure to your triggers. This can help prevent allergy symptoms, asthma, and other health problems. Ask your doctor about allergy medicine or immunotherapy. These treatments may help reduce or prevent allergy symptoms. Follow-up care is a key part of your treatment and safety. Be sure to make and go to all appointments, and call your doctor if you are having problems. It's also a good idea to know your test results and keep a list of the medicines you take. How can you care for yourself at home?   · If you think that dust or dust mites are causing your allergies:  ? Wash sheets, pillowcases, and other bedding every week in hot water. ? Use airtight, dust-proof covers for pillows, duvets, and mattresses. Avoid plastic covers, because they tend to tear quickly and do not \"breathe. \" Wash according to the instructions. ? Remove extra blankets and pillows that you don't need. ? Use blankets that are machine-washable. ? Don't use home humidifiers. They can help mites live longer. · Use air-conditioning. Change or clean all filters every month. Keep windows closed. Use high-efficiency air filters. Don't use window or attic fans, which draw dust into the air. · If you're allergic to pet dander, keep pets outside or, at the very least, out of your bedroom. Old carpet and cloth-covered furniture can hold a lot of animal dander. You may need to replace them. · Look for signs of cockroaches. Use cockroach baits to get rid of them. Then clean your home well. · If you're allergic to mold, don't keep indoor plants, because molds can grow in soil. Get rid of furniture, rugs, and drapes that smell musty. Check for mold in the bathroom. · If you're allergic to pollen, stay inside when pollen counts are high. · Don't smoke or let anyone else smoke in your house. Don't use fireplaces or wood-burning stoves. Avoid paint fumes, perfumes, and other strong odors. When should you call for help? Give an epinephrine shot if:    · You think you are having a severe allergic reaction.    After giving an epinephrine shot call 911, even if you feel better.   Call 911 if:    · You have symptoms of a severe allergic reaction. These may include:  ? Sudden raised, red areas (hives) all over your body. ? Swelling of the throat, mouth, lips, or tongue. ? Trouble breathing. ? Passing out (losing consciousness).  Or you may feel very lightheaded or suddenly feel weak, confused, or restless.     · You have been given an epinephrine shot, even if you feel better.    Call your doctor now or seek immediate medical care if:    · You have symptoms of an allergic reaction, such as:  ? A rash or hives (raised, red areas on the skin). ? Itching. ? Swelling. ? Belly pain, nausea, or vomiting.    Watch closely for changes in your health, and be sure to contact your doctor if:    · Your allergies get worse.     · You need help controlling your allergies.     · You have questions about allergy testing.     · You do not get better as expected. Where can you learn more? Go to http://batsheva-willie.info/. Enter L249 in the search box to learn more about \"Managing Your Allergies: Care Instructions. \"  Current as of: April 7, 2019  Content Version: 12.2  © 6872-2944 [employer]. Care instructions adapted under license by [employer] (which disclaims liability or warranty for this information). If you have questions about a medical condition or this instruction, always ask your healthcare professional. [address] any warranty or liability for your use of this information.

## 2020-01-22 NOTE — PROGRESS NOTES
[facility]    CC: Cough    HPI:     Cough:  - Timing/onset: Started ~one month ago  - Duration: Daily/constant  - Quality: Productive of yellow or white phlegm  - Context: Worse at night  - Progression/Course: Worsening  - Associated Symptoms/signs: Watery eyes and runny nose  - Alleviating factors: Dust removal  - Notable pertinent PMH of allergy to pollen      ROS: Positive items marked in RED  CON: fever, chills  Cardiovascular: palpitations, CP  Resp: SOB, cough  GI: nausea, vomiting, diarrhea  : dysuria, hematuria      Past Medical History:   Diagnosis Date    Anxiety and depression     Asthma 11/3/2010    Chronic pain in right shoulder     Depression 10/19/2015    Eczema 10/19/2015    Head pain 4/28/2014    HTN (hypertension)     Nicotine dependence     Syphilis in female 3/2/2015       Past Surgical History:   Procedure Laterality Date    ENDOSCOPY, COLON, DIAGNOSTIC      HX TUBAL LIGATION Bilateral        Family History   Problem Relation Age of Onset    Breast Cancer Maternal Grandmother     Cancer Mother        Social History     Socioeconomic History    Marital status:      Spouse name: Not on file    Number of children: Not on file    Years of education: Not on file    Highest education level: Not on file   Tobacco Use    Smoking status: Current Every Day Smoker     Packs/day: 0.50     Types: Cigarettes    Smokeless tobacco: Never Used   Substance and Sexual Activity    Alcohol use: Yes    Drug use: No    Sexual activity: Yes     Partners: Male       Allergies   Allergen Reactions    Aspirin Nausea and Vomiting    Prednisone Swelling         Current Outpatient Medications:     famotidine (PEPCID) 20 mg tablet, Take 1 Tab by mouth two (2) times a day., Disp: 60 Tab, Rfl: 5    traZODone (DESYREL) 100 mg tablet, Take 1 Tab by mouth nightly., Disp: 30 Tab, Rfl: 5    buPROPion SR (WELLBUTRIN SR) 150 mg SR tablet, Take 1 Tab by mouth two (2) times a day. , Disp: 60 Tab, Rfl: 5    pravastatin (PRAVACHOL) 40 mg tablet, Take 1 Tab by mouth nightly., Disp: 30 Tab, Rfl: 5    lisinopril (PRINIVIL, ZESTRIL) 10 mg tablet, Take 1 Tab by mouth daily. , Disp: 30 Tab, Rfl: 5    ketoconazole (NIZORAL) 2 % topical cream, Apply once daily to cover the affected and immediate surrounding area for 2 weeks, Disp: 30 g, Rfl: 2    acetaminophen (TYLENOL EXTRA STRENGTH) 500 mg tablet, Take  by mouth every six (6) hours as needed for Pain., Disp: , Rfl:     diphenhydrAMINE (BENADRYL ALLERGY) 25 mg tablet, Take 25 mg by mouth every six (6) hours as needed for Sleep., Disp: , Rfl:     hydroCHLOROthiazide (HYDRODIURIL) 25 mg tablet, Take 1 Tab by mouth daily. , Disp: 90 Tab, Rfl: 1    albuterol (PROVENTIL HFA, VENTOLIN HFA, PROAIR HFA) 90 mcg/actuation inhaler, Take 2 Puffs by inhalation every six (6) hours as needed for Wheezing., Disp: 3 Inhaler, Rfl: 2    ALPRAZolam (XANAX) 0.5 mg tablet, Take 1 Tab by mouth two (2) times daily as needed for Anxiety.  Max Daily Amount: 1 mg., Disp: 30 Tab, Rfl: 1    Physical Exam:      BP (!) 126/96   Pulse 89   Temp 97.8 °F (36.6 °C) (Oral)   Resp 14   Ht 5' 4\" (1.626 m)   Wt 227 lb (103 kg)   SpO2 94%   BMI 38.96 kg/m²     General: obese habitus, NAD, conversant  Eyes: sclera clear bilaterally, no discharge noted, eyelids normal in appearance  HENT: NCAT, nasal turbinates enlarged bilaterally, oropharynx clear, MMM  Lungs: CTAB, normal respiratory effort and rate  CV: RRR, no MRGs  ABD: soft, non-tender, non-distended, normal bowel sounds  Skin: normal temperature, turgor, color, and texture  Psych: alert and oriented to person, place and situation, normal affect  Neuro: speech normal, moving all extremities      Assessment/Plan     Rhinitis:  -Likely cause of cough via post nasal drip  -Suspect allergic etiology  -Started on Flonase and azelastine regimen  -Allergen profile ordered  -Handout given on rhinitis care and allergy management  -F/U at already scheduled appointment        Maksim Bishop MD  1/22/2020, 2:55 PM

## 2020-01-24 ENCOUNTER — OFFICE VISIT (OUTPATIENT)
Dept: ORTHOPEDIC SURGERY | Facility: CLINIC | Age: 55
End: 2020-01-24

## 2020-01-24 VITALS
TEMPERATURE: 97.1 F | HEART RATE: 91 BPM | RESPIRATION RATE: 15 BRPM | BODY MASS INDEX: 40.74 KG/M2 | WEIGHT: 238.6 LBS | OXYGEN SATURATION: 96 % | HEIGHT: 64 IN | DIASTOLIC BLOOD PRESSURE: 23 MMHG | SYSTOLIC BLOOD PRESSURE: 146 MMHG

## 2020-01-24 DIAGNOSIS — S46.011A TRAUMATIC COMPLETE TEAR OF RIGHT ROTATOR CUFF, INITIAL ENCOUNTER: Primary | ICD-10-CM

## 2020-01-24 DIAGNOSIS — M75.21 BICEPS TENDINITIS OF RIGHT SHOULDER: ICD-10-CM

## 2020-01-24 NOTE — PROGRESS NOTES
1. Have you been to the ER, urgent care clinic since your last visit? Hospitalized since your last visit? No    2. Have you seen or consulted any other health care providers outside of the [address] [place] [address] since your last visit? Include any pap smears or colon screening.  No

## 2020-01-24 NOTE — PROGRESS NOTES
Patient: Jack Souza                MRN: [id number]       SSN: [id number]  YOB: 1965        AGE: 48 y.o. SEX: female  Body mass index is 40.96 kg/m². PCP: Dirk Aranda MD  01/24/20    Chief Complaint: Right shoulder MRI follow up    HISTORY OF PRESENT ILLNESS:  Andrew Gaviria returns to the office today for her right shoulder. She has continued to have right shoulder pain. She had her MRI done. Unfortunately, since her last visit, she has had a few falls where her shoulder pain has worsened. She has pain with reaching overhead. She has difficulty with use of her arm due to pain. She has pain at night. Past Medical History:   Diagnosis Date    Anxiety and depression     Asthma 11/3/2010    Chronic pain in right shoulder     Depression 10/19/2015    Eczema 10/19/2015    Head pain 4/28/2014    HTN (hypertension)     Nicotine dependence     Syphilis in female 3/2/2015       Family History   Problem Relation Age of Onset    Breast Cancer Maternal Grandmother     Cancer Mother        Current Outpatient Medications   Medication Sig Dispense Refill    albuterol (PROVENTIL HFA, VENTOLIN HFA, PROAIR HFA) 90 mcg/actuation inhaler Take 2 Puffs by inhalation every six (6) hours as needed for Wheezing. 3 Inhaler 2    azelastine (ASTELIN) 137 mcg (0.1 %) nasal spray 1 [place] by Both Nostrils route two (2) times a day. Use in each nostril as directed 1 Bottle 5    fluticasone propionate (FLONASE) 50 mcg/actuation nasal spray 2 Sprays by Both Nostrils route daily. 1 Bottle 5    famotidine (PEPCID) 20 mg tablet Take 1 Tab by mouth two (2) times a day. 60 Tab 5    traZODone (DESYREL) 100 mg tablet Take 1 Tab by mouth nightly. 30 Tab 5    buPROPion SR (WELLBUTRIN SR) 150 mg SR tablet Take 1 Tab by mouth two (2) times a day. 60 Tab 5    pravastatin (PRAVACHOL) 40 mg tablet Take 1 Tab by mouth nightly.  30 Tab 5    lisinopril (PRINIVIL, ZESTRIL) 10 mg tablet Take 1 Tab by mouth daily. 30 Tab 5    ketoconazole (NIZORAL) 2 % topical cream Apply once daily to cover the affected and immediate surrounding area for 2 weeks 30 g 2    acetaminophen (TYLENOL EXTRA STRENGTH) 500 mg tablet Take  by mouth every six (6) hours as needed for Pain.  diphenhydrAMINE (BENADRYL ALLERGY) 25 mg tablet Take 25 mg by mouth every six (6) hours as needed for Sleep.  hydroCHLOROthiazide (HYDRODIURIL) 25 mg tablet Take 1 Tab by mouth daily. 90 Tab 1    ALPRAZolam (XANAX) 0.5 mg tablet Take 1 Tab by mouth two (2) times daily as needed for Anxiety. Max Daily Amount: 1 mg. 30 Tab 1       Allergies   Allergen Reactions    Aspirin Nausea and Vomiting    Prednisone Swelling       Past Surgical History:   Procedure Laterality Date    ENDOSCOPY, COLON, DIAGNOSTIC      HX TUBAL LIGATION Bilateral        Social History     Socioeconomic History    Marital status:      Spouse name: Not on file    Number of children: Not on file    Years of education: Not on file    Highest education level: Not on file   Occupational History    Not on file   Social Needs    Financial resource strain: Not on file    Food insecurity:     Worry: Not on file     Inability: Not on file    Transportation needs:     Medical: Not on file     Non-medical: Not on file   Tobacco Use    Smoking status: Current Every Day Smoker     Packs/day: 0.50     Types: Cigarettes    Smokeless tobacco: Never Used   Substance and Sexual Activity    Alcohol use:  Yes    Drug use: No    Sexual activity: Yes     Partners: Male   Lifestyle    Physical activity:     Days per week: Not on file     Minutes per session: Not on file    Stress: Not on file   Relationships    Social connections:     Talks on phone: Not on file     Gets together: Not on file     Attends Taoism service: Not on file     Active member of club or organization: Not on file     Attends meetings of clubs or organizations: Not on file     Relationship status: Not on file    Intimate partner violence:     Fear of current or ex partner: Not on file     Emotionally abused: Not on file     Physically abused: Not on file     Forced sexual activity: Not on file   Other Topics Concern    Not on file   Social History Narrative    Not on file       REVIEW OF SYSTEMS:      No changes from previous review of systems unless noted. PHYSICAL EXAMINATION:  Visit Vitals  BP (!) 146/23 (BP 1 Location: Left arm, BP Patient Position: Sitting)   Pulse 91   Temp 97.1 °F (36.2 °C) (Oral)   Resp 15   Ht 5' 4\" (1.626 m)   Wt 238 lb 9.6 oz (108.2 kg)   SpO2 96%   BMI 40.96 kg/m²     Body mass index is 40.96 kg/m². GENERAL: Alert and oriented x3, in no acute distress. HEENT: Normocephalic, atraumatic. RESP: Non labored breathing. SKIN: No rashes or lesions noted. PHYSICAL EXAM:  Physical exam of the right shoulder with full passive range of motion. She has pain with forward flexion, as well as pain bringing her arm from a position of forward flexion down. She has pain with biceps stress testing. She has pain and weakness with supraspinatus rotator cuff stress testing. IMAGING:  An MRI of the right shoulder was reviewed, which shows a supraspinatus rotator cuff tear anterior aspect, which appears to be full thickness on several of the cuts. ASSESSMENT AND PLAN:   Jayshree Rider is a 48year-old female with a right rotator cuff tear. I had a lengthy discussion with her regarding treatment options today. Due to her age and activity level, I have recommended a rotator cuff repair. We discussed the risks and benefits of the procedure, as well as alternatives. She would like to move forward with it. We will start the process of getting it set up.               Electronically signed by: Samuel Benavides MD

## 2020-02-03 DIAGNOSIS — Z01.812 PRE-OPERATIVE LABORATORY EXAMINATION: ICD-10-CM

## 2020-02-03 DIAGNOSIS — Z01.810 PREOP CARDIOVASCULAR EXAM: ICD-10-CM

## 2020-02-03 DIAGNOSIS — S46.011A TRAUMATIC COMPLETE TEAR OF RIGHT ROTATOR CUFF, INITIAL ENCOUNTER: Primary | ICD-10-CM

## 2020-02-03 DIAGNOSIS — M75.21 BICEPS TENDINITIS OF RIGHT SHOULDER: ICD-10-CM

## 2020-02-07 DIAGNOSIS — I10 ESSENTIAL HYPERTENSION WITH GOAL BLOOD PRESSURE LESS THAN 130/80: Primary | ICD-10-CM

## 2020-02-07 RX ORDER — LOSARTAN POTASSIUM 25 MG/1
25 TABLET ORAL DAILY
Qty: 30 TAB | Refills: 2 | Status: SHIPPED | OUTPATIENT
Start: 2020-02-07 | End: 2020-04-02 | Stop reason: SDUPTHER

## 2020-02-10 LAB
ALTERNARIA ALTERNATA,M6A: <0.35 KU/L
ASPERGILLUS FUMIGATUS, 164286: <0.35 KU/L
BERMUDA GRASS,G2A: 2.1 KU/L
BIRCH,TRE1: 0.94 KU/L
CAT DANDER IGE, 805296: 0.45 KU/L
CLADOSPORIUM HERBARU,M2A: <0.35 KU/L
COCKROACH,I6A: 1.63 KU/L
COTTONWOOD,INH9: 0.37 KU/L
D001 D PTERONYSSINUS, 610725: 5.81 KU/L
D002 D FARINAE MITE, 069211: 5.96 KU/L
DOG DANDER,INH27: 1.43 KU/L
ELM,INH10: <0.35 KU/L
IMMUNOGLOBULIN E,TOTAL, 002173: 2197 KU/L (ref 0–114)
JOHNSON GRASS,G10A: 2.78 KU/L
MAPLE/BOX ELDER,TRE3: 0.46 KU/L
MOUSE URINE PROTEINS (E72) IGE: <0.35 KU/L
MULBERRY,T70A: <0.35 KU/L
OAK,INH6: 2.74 KU/L
PECAN,FOO16: 0.92 KU/L
PENICILLIUM NOTATUM,INH3: 1.11 KU/L
PIGWEED,W14A: 0.49 KU/L
SHEEP SORREL, WEE15: <0.35 KU/L
T006 CEDAR, MOUNTAIN, 068650: 1.91 KU/L
TIMOTHY GRASS, 068890: 12.3 KU/L
W001 RAGWEED, SHORT/COMMON, 069013: 4.41 KU/L

## 2020-02-12 ENCOUNTER — OFFICE VISIT (OUTPATIENT)
Dept: FAMILY MEDICINE CLINIC | Age: 55
End: 2020-02-12

## 2020-02-12 VITALS
OXYGEN SATURATION: 96 % | RESPIRATION RATE: 16 BRPM | TEMPERATURE: 97.5 F | HEIGHT: 64 IN | SYSTOLIC BLOOD PRESSURE: 108 MMHG | BODY MASS INDEX: 38.93 KG/M2 | DIASTOLIC BLOOD PRESSURE: 78 MMHG | HEART RATE: 89 BPM | WEIGHT: 228 LBS

## 2020-02-12 DIAGNOSIS — F41.9 ANXIETY AND DEPRESSION: ICD-10-CM

## 2020-02-12 DIAGNOSIS — J30.89 NON-SEASONAL ALLERGIC RHINITIS, UNSPECIFIED TRIGGER: ICD-10-CM

## 2020-02-12 DIAGNOSIS — E11.9 TYPE 2 DIABETES MELLITUS WITHOUT COMPLICATION, WITHOUT LONG-TERM CURRENT USE OF INSULIN (HCC): Primary | ICD-10-CM

## 2020-02-12 DIAGNOSIS — I10 ESSENTIAL HYPERTENSION WITH GOAL BLOOD PRESSURE LESS THAN 130/80: ICD-10-CM

## 2020-02-12 DIAGNOSIS — F32.A ANXIETY AND DEPRESSION: ICD-10-CM

## 2020-02-12 DIAGNOSIS — E78.5 HYPERLIPIDEMIA, UNSPECIFIED HYPERLIPIDEMIA TYPE: ICD-10-CM

## 2020-02-12 RX ORDER — MINERAL OIL
180 ENEMA (ML) RECTAL DAILY
Qty: 90 TAB | Refills: 5 | Status: ON HOLD | OUTPATIENT
Start: 2020-02-12 | End: 2020-03-03

## 2020-02-12 RX ORDER — METFORMIN HYDROCHLORIDE 500 MG/1
500 TABLET, EXTENDED RELEASE ORAL
Qty: 30 TAB | Refills: 5 | Status: SHIPPED | OUTPATIENT
Start: 2020-02-12 | End: 2020-07-27

## 2020-02-12 NOTE — PROGRESS NOTES
1. Have you been to the ER, urgent care clinic since your last visit? Hospitalized since your last visit? No    2. Have you seen or consulted any other health care providers outside of the [address] [place] [address] since your last visit? Include any pap smears or colon screening.  No

## 2020-02-12 NOTE — PROGRESS NOTES
[facility]    CC: F/U for Chronic Disease Management    HPI:     HTN:  -Got requested lab work  -Taking BP medication as prescribed.    -Denies any issues or side effects with BP medication.  -Not checking BP at home  -Has cut red meat out of her diet  -Exercising 4 days a week for 20-30 minutes       Anxiety and Depression:  -Taking bupropion as prescribed  -Denies any side effects or issues with medication  -Reports that the medication has helped keep her mood controlled        HLD:  -Taking statin as prescribed  -Denies any side effects or issues with statin  -Has cut red meat out of her diet  -Exercising 4 days a week for 20-30 minutes      Elevated Fasting Blood Sugar:  -Got requested lab work  -On no glucose lowering medicatiomn  -Does not check blood sugar at home  -Has cut red meat out of her diet  -Exercising 4 days a week for 20-30 minutes      Rhinitis:  -Got requested lab work   -Not taking Flonase as prescribed  -Has had some improvement in symptoms since last visit, but still has the associated cough      ROS: Positive items marked in RED  CON: fever, chills  Cardiovascular: palpitations, CP  Resp: SOB, cough  GI: nausea, vomiting, diarrhea  : dysuria, hematuria      Past Medical History:   Diagnosis Date    Anxiety and depression     Asthma 11/3/2010    Chronic pain in right shoulder     Depression 10/19/2015    Eczema 10/19/2015    Head pain 4/28/2014    HTN (hypertension)     Nicotine dependence     Syphilis in female 3/2/2015       Past Surgical History:   Procedure Laterality Date    ENDOSCOPY, COLON, DIAGNOSTIC      HX TUBAL LIGATION Bilateral        Family History   Problem Relation Age of Onset    Breast Cancer Maternal Grandmother     Cancer Mother        Social History     Socioeconomic History    Marital status:      Spouse name: Not on file    Number of children: Not on file    Years of education: Not on file    Highest education level: Not on file Tobacco Use    Smoking status: Current Every Day Smoker     Packs/day: 0.50     Types: Cigarettes    Smokeless tobacco: Never Used   Substance and Sexual Activity    Alcohol use: Yes    Drug use: No    Sexual activity: Yes     Partners: Male       Allergies   Allergen Reactions    Aspirin Nausea and Vomiting    Lisinopril Cough    Prednisone Swelling         Current Outpatient Medications:     losartan (COZAAR) 25 mg tablet, Take 1 Tab by mouth daily. , Disp: 30 Tab, Rfl: 2    azelastine (ASTELIN) 137 mcg (0.1 %) nasal spray, 1 [place] by Both Nostrils route two (2) times a day. Use in each nostril as directed, Disp: 1 Bottle, Rfl: 5    fluticasone propionate (FLONASE) 50 mcg/actuation nasal spray, 2 Sprays by Both Nostrils route daily. , Disp: 1 Bottle, Rfl: 5    famotidine (PEPCID) 20 mg tablet, Take 1 Tab by mouth two (2) times a day., Disp: 60 Tab, Rfl: 5    traZODone (DESYREL) 100 mg tablet, Take 1 Tab by mouth nightly., Disp: 30 Tab, Rfl: 5    buPROPion SR (WELLBUTRIN SR) 150 mg SR tablet, Take 1 Tab by mouth two (2) times a day., Disp: 60 Tab, Rfl: 5    pravastatin (PRAVACHOL) 40 mg tablet, Take 1 Tab by mouth nightly., Disp: 30 Tab, Rfl: 5    hydroCHLOROthiazide (HYDRODIURIL) 25 mg tablet, Take 1 Tab by mouth daily. , Disp: 90 Tab, Rfl: 1    ketoconazole (NIZORAL) 2 % topical cream, Apply once daily to cover the affected and immediate surrounding area for 2 weeks, Disp: 30 g, Rfl: 2    acetaminophen (TYLENOL EXTRA STRENGTH) 500 mg tablet, Take  by mouth every six (6) hours as needed for Pain., Disp: , Rfl:     diphenhydrAMINE (BENADRYL ALLERGY) 25 mg tablet, Take 25 mg by mouth every six (6) hours as needed for Sleep., Disp: , Rfl:     albuterol (PROVENTIL HFA, VENTOLIN HFA, PROAIR HFA) 90 mcg/actuation inhaler, Take 2 Puffs by inhalation every six (6) hours as needed for Wheezing., Disp: 3 Inhaler, Rfl: 2    ALPRAZolam (XANAX) 0.5 mg tablet, Take 1 Tab by mouth two (2) times daily as needed for Anxiety.  Max Daily Amount: 1 mg., Disp: 30 Tab, Rfl: 1    Physical Exam:      /78   Pulse 89   Temp 97.5 °F (36.4 °C) (Oral)   Resp 16   Ht 5' 4\" (1.626 m)   Wt 228 lb (103.4 kg)   SpO2 96%   BMI 39.14 kg/m²     General: obese habitus, NAD, conversant  Eyes: sclera clear bilaterally, no discharge noted, eyelids normal in appearance  HENT: NCAT  Lungs: CTAB, normal respiratory effort and rate  CV: RRR, no MRGs  ABD: soft, non-tender, non-distended, normal bowel sounds  Skin: normal temperature, turgor, color, and texture  Psych: alert and oriented to person, place and situation, normal affect  Neuro: speech normal, moving all extremities, gait normal      Hgb A1C With Est Avg Glucose (01/08/2020 8:56 AM EST)   Component Value Ref Range Performed At Pathologist Signature   Hemoglobin A1c 8.7 (H) 4.8 - 5.6 % [facility]     Estimated Average Glucose 202 (H) 91 - 123 mg/dL [facility]         Microalbumin Random Urine (01/08/2020 8:56 AM EST)   Component Value Ref Range Performed At Pathologist Signature   MICROALBUMIN RANDOM 21.8 (H) 0.1 - 17.0 mg/L [facility]     Microalbumin/Creatinine Ratio 10.8 0.0 - 30.0 [facility]     Creatinine Urine MG/ mg/dL [facility]        Allergen Profile (2/5/2020):  Component Value Flag Ref Range Units Status   Dog dander 1.43  High   <0.35 kU/L Final   D. pteronyssinus 5.81  High   <0.35 kU/L Final   D. farinae Mite 5.96  High   <0.35 kU/L Final   Elm <0.35   <0.35 kU/L Final   Sin grass 2.78  High   <0.35 kU/L Final   Maple/[place] 0.46  High   <0.35 kU/L Final   MULBERRY <0.35   <0.35 kU/L Final   [place] 2.74  High   <0.35 kU/L Final   Pecan 0.92  High   <0.35 kU/L Final   Penicillium notatum 1.11  High   <0.35 kU/L Final   PIGWEED 0.49  High   <0.35 kU/L Final   Ragweed, Short/Common 4.41  High   <0.35 kU/L Final   COCKROACH 1.63  High   <0.35 kU/L Final   Sheep Sorrel <0.35   <0.35 kU/L Final [place] grass 12.30  High   <0.35 kU/L Final   Aspergillus fumigatus Ab <0.35   <0.35 kU/L Final   ALTERNARIA ALTERNATA <0.35   <0.35 kU/L Final   [place] grass, IgE 2.10  High   <0.35 kU/L Final   Birch 0.94  High   <0.35 kU/L Final   Cat dander, IgE 0.45  High   <0.35 kU/L Final   CLADOSPORIUM HERBARU <0.35   <0.35 kU/L Final   [place] 0.37  High   <0.35 kU/L Final   [place] 1.91  High   <0.35 kU/L Final   MOUSE URINE PROTEINS (E72) IGE <0.35   <0.35 kU/L Final   Immunoglobulin E 2,197  High   0 - 114 kU/L Final       Assessment/Plan     Hypertension, Well Controlled:  -Last 10 year ASCVD risk determined to be 34.6%  -BP at goal of >130/80  -Will continue current BP medication regimen  -Follow-up in 1 month        Anxiety and Depression, Well Controlled:  -Will continue current bupropion regimen  -Follow-up in 1 month        Hyperlipidemia:  -Last 10 year ASCVD risk determined to be 34.6%  -Will continue current pravastatin regimen  -May need to start on high intensity statin regimen in future if ASCVD risk remains high  -Follow-up in 1 month      DMII:  -Counseled on diagnosis and recommended care  -Started on 500 mg of metformin daily  -Handouts given on diabetic diet guidelines/meal planning, carb counting, and nutrition  -Follow-up in 1 month      Allergic Rhinitis:  -Counseled on positive allergens  -Discussed importance of trigger avoidance  -Started on Allegra regimen  -Handout given on allergy management  -Follow-up in 1 month        Arin Estrada MD  2/12/2020, 12:03 PM

## 2020-02-12 NOTE — PATIENT INSTRUCTIONS
Managing Your Allergies: Care Instructions  Your Care Instructions    Managing your allergies is an important part of staying healthy. Your doctor will help you find out what may be causing the allergies. Common causes of allergy symptoms are house dust and dust mites, animal dander, mold, and pollen. As soon as you know what triggers your symptoms, try to reduce your exposure to your triggers. This can help prevent allergy symptoms, asthma, and other health problems. Ask your doctor about allergy medicine or immunotherapy. These treatments may help reduce or prevent allergy symptoms. Follow-up care is a key part of your treatment and safety. Be sure to make and go to all appointments, and call your doctor if you are having problems. It's also a good idea to know your test results and keep a list of the medicines you take. How can you care for yourself at home? · If you think that dust or dust mites are causing your allergies:  ? Wash sheets, pillowcases, and other bedding every week in hot water. ? Use airtight, dust-proof covers for pillows, duvets, and mattresses. Avoid plastic covers, because they tend to tear quickly and do not \"breathe. \" Wash according to the instructions. ? Remove extra blankets and pillows that you don't need. ? Use blankets that are machine-washable. ? Don't use home humidifiers. They can help mites live longer. · Use air-conditioning. Change or clean all filters every month. Keep windows closed. Use high-efficiency air filters. Don't use window or attic fans, which draw dust into the air. · If you're allergic to pet dander, keep pets outside or, at the very least, out of your bedroom. Old carpet and cloth-covered furniture can hold a lot of animal dander. You may need to replace them. · Look for signs of cockroaches. Use cockroach baits to get rid of them. Then clean your home well. · If you're allergic to mold, don't keep indoor plants, because molds can grow in soil. Get rid of furniture, rugs, and drapes that smell musty. Check for mold in the bathroom. · If you're allergic to pollen, stay inside when pollen counts are high. · Don't smoke or let anyone else smoke in your house. Don't use fireplaces or wood-burning stoves. Avoid paint fumes, perfumes, and other strong odors. When should you call for help? Give an epinephrine shot if:    · You think you are having a severe allergic reaction.    After giving an epinephrine shot call 911, even if you feel better.   Call 911 if:    · You have symptoms of a severe allergic reaction. These may include:  ? Sudden raised, red areas (hives) all over your body. ? Swelling of the throat, mouth, lips, or tongue. ? Trouble breathing. ? Passing out (losing consciousness). Or you may feel very lightheaded or suddenly feel weak, confused, or restless.     · You have been given an epinephrine shot, even if you feel better.    Call your doctor now or seek immediate medical care if:    · You have symptoms of an allergic reaction, such as:  ? A rash or hives (raised, red areas on the skin). ? Itching. ? Swelling. ? Belly pain, nausea, or vomiting.    Watch closely for changes in your health, and be sure to contact your doctor if:    · Your allergies get worse.     · You need help controlling your allergies.     · You have questions about allergy testing.     · You do not get better as expected. Where can you learn more? Go to http://batsheva-willie.info/. Enter L249 in the search box to learn more about \"Managing Your Allergies: Care Instructions. \"  Current as of: April 7, 2019  Content Version: 12.2  © 2129-5612 [employer]. Care instructions adapted under license by [employer] (which disclaims liability or warranty for this information).  If you have questions about a medical condition or this instruction, always ask your healthcare professional. Elizabeth Kim disclaims any warranty or liability for your use of this information. Learning About Diabetes Food Guidelines  Your Care Instructions    Meal planning is important to manage diabetes. It helps keep your blood sugar at a target level (which you set with your doctor). You don't have to eat special foods. You can eat what your family eats, including sweets once in a while. But you do have to pay attention to how often you eat and how much you eat of certain foods. You may want to work with a dietitian or a certified diabetes educator (CDE) to help you plan meals and snacks. A dietitian or CDE can also help you lose weight if that is one of your goals. What should you know about eating carbs? Managing the amount of carbohydrate (carbs) you eat is an important part of healthy meals when you have diabetes. Carbohydrate is found in many foods. · Learn which foods have carbs. And learn the amounts of carbs in different foods. ? Bread, cereal, pasta, and rice have about 15 grams of carbs in a serving. A serving is 1 slice of bread (1 ounce), ½ cup of cooked cereal, or 1/3 cup of cooked pasta or rice. ? Fruits have 15 grams of carbs in a serving. A serving is 1 small fresh fruit, such as an apple or orange; ½ of a banana; ½ cup of cooked or canned fruit; ½ cup of fruit juice; 1 cup of melon or raspberries; or 2 tablespoons of dried fruit. ? Milk and no-sugar-added yogurt have 15 grams of carbs in a serving. A serving is 1 cup of milk or 2/3 cup of no-sugar-added yogurt. ? Starchy vegetables have 15 grams of carbs in a serving. A serving is ½ cup of mashed potatoes or sweet potato; 1 cup winter squash; ½ of a small baked potato; ½ cup of cooked beans; or ½ cup cooked corn or green peas. · Learn how much carbs to eat each day and at each meal. A dietitian or CDE can teach you how to keep track of the amount of carbs you eat. This is called carbohydrate counting.   · If you are not sure how to count carbohydrate grams, use the Plate Method to plan meals. It is a good, quick way to make sure that you have a balanced meal. It also helps you spread carbs throughout the day. ? Divide your plate by types of foods. Put non-starchy vegetables on half the plate, meat or other protein food on one-quarter of the plate, and a grain or starchy vegetable in the final quarter of the plate. To this you can add a small piece of fruit and 1 cup of milk or yogurt, depending on how many carbs you are supposed to eat at a meal.  · Try to eat about the same amount of carbs at each meal. Do not \"save up\" your daily allowance of carbs to eat at one meal.  · Proteins have very little or no carbs per serving. Examples of proteins are beef, chicken, turkey, fish, eggs, tofu, cheese, cottage cheese, and peanut butter. A serving size of meat is 3 ounces, which is about the size of a deck of cards. Examples of meat substitute serving sizes (equal to 1 ounce of meat) are 1/4 cup of cottage cheese, 1 egg, 1 tablespoon of peanut butter, and ½ cup of tofu. How can you eat out and still eat healthy? · Learn to estimate the serving sizes of foods that have carbohydrate. If you measure food at home, it will be easier to estimate the amount in a serving of restaurant food. · If the meal you order has too much carbohydrate (such as potatoes, corn, or baked beans), ask to have a low-carbohydrate food instead. Ask for a salad or green vegetables. · If you use insulin, check your blood sugar before and after eating out to help you plan how much to eat in the future. · If you eat more carbohydrate at a meal than you had planned, take a walk or do other exercise. This will help lower your blood sugar. What else should you know? · Limit saturated fat, such as the fat from meat and dairy products. This is a healthy choice because people who have diabetes are at higher risk of heart disease. So choose lean cuts of meat and nonfat or low-fat dairy products.  Use olive or canola oil instead of butter or shortening when cooking. · Don't skip meals. Your blood sugar may drop too low if you skip meals and take insulin or certain medicines for diabetes. · Check with your doctor before you drink alcohol. Alcohol can cause your blood sugar to drop too low. Alcohol can also cause a bad reaction if you take certain diabetes medicines. Follow-up care is a key part of your treatment and safety. Be sure to make and go to all appointments, and call your doctor if you are having problems. It's also a good idea to know your test results and keep a list of the medicines you take. Where can you learn more? Go to http://batsheva-willie.info/. Enter N761 in the search box to learn more about \"Learning About Diabetes Food Guidelines. \"  Current as of: April 16, 2019  Content Version: 12.2  © 0513-3147 [employer]. Care instructions adapted under license by [employer] (which disclaims liability or warranty for this information). If you have questions about a medical condition or this instruction, always ask your healthcare professional. [address] any warranty or liability for your use of this information. Learning About Meal Planning for Diabetes  Why plan your meals? Meal planning can be a key part of managing diabetes. Planning meals and snacks with the right balance of carbohydrate, protein, and fat can help you keep your blood sugar at the target level you set with your doctor. You don't have to eat special foods. You can eat what your family eats, including sweets once in a while. But you do have to pay attention to how often you eat and how much you eat of certain foods. You may want to work with a dietitian or a certified diabetes educator. He or she can give you tips and meal ideas and can answer your questions about meal planning.  This health professional can also help you reach a healthy weight if that is one of your goals.  What plan is right for you? Your dietitian or diabetes educator may suggest that you start with the plate format or carbohydrate counting. The plate format  The plate format is a simple way to help you manage how you eat. You plan meals by learning how much space each food should take on a plate. Using the plate format helps you spread carbohydrate throughout the day. It can make it easier to keep your blood sugar level within your target range. It also helps you see if you're eating healthy portion sizes. To use the plate format, you put non-starchy vegetables on half your plate. Add meat or meat substitutes on one-quarter of the plate. Put a grain or starchy vegetable (such as brown rice or a potato) on the final quarter of the plate. You can add a small piece of fruit and some low-fat or fat-free milk or yogurt, depending on your carbohydrate goal for each meal.  Here are some tips for using the plate format:  · Make sure that you are not using an oversized plate. A 9-inch plate is best. Many restaurants use larger plates. · Get used to using the plate format at home. Then you can use it when you eat out. · Write down your questions about using the plate format. Talk to your doctor, a dietitian, or a diabetes educator about your concerns. Carbohydrate counting  With carbohydrate counting, you plan meals based on the amount of carbohydrate in each food. Carbohydrate raises blood sugar higher and more quickly than any other nutrient. It is found in desserts, breads and cereals, and fruit. It's also found in starchy vegetables such as potatoes and corn, grains such as rice and pasta, and milk and yogurt. Spreading carbohydrate throughout the day helps keep your blood sugar levels within your target range. Your daily amount depends on several things, including your weight, how active you are, which diabetes medicines you take, and what your goals are for your blood sugar levels.  A registered dietitian or diabetes educator can help you plan how much carbohydrate to include in each meal and snack. A guideline for your daily amount of carbohydrate is:  · 45 to 60 grams at each meal. That's about the same as 3 to 4 carbohydrate servings. · 15 to 20 grams at each snack. That's about the same as 1 carbohydrate serving. The Nutrition Facts label on packaged foods tells you how much carbohydrate is in a serving of the food. First, look at the serving size on the food label. Is that the amount you eat in a serving? All of the nutrition information on a food label is based on that serving size. So if you eat more or less than that, you'll need to adjust the other numbers. Total carbohydrate is the next thing you need to look for on the label. If you count carbohydrate servings, one serving of carbohydrate is 15 grams. For foods that don't come with labels, such as fresh fruits and vegetables, you'll need a guide that lists carbohydrate in these foods. Ask your doctor, dietitian, or diabetes educator about books or other nutrition guides you can use. If you take insulin, you need to know how many grams of carbohydrate are in a meal. This lets you know how much rapid-acting insulin to take before you eat. If you use an insulin pump, you get a constant rate of insulin during the day. So the pump must be programmed at meals to give you extra insulin to cover the rise in blood sugar after meals. When you know how much carbohydrate you will eat, you can take the right amount of insulin. Or, if you always use the same amount of insulin, you need to make sure that you eat the same amount of carbohydrate at meals. If you need more help to understand carbohydrate counting and food labels, ask your doctor, dietitian, or diabetes educator. How do you get started with meal planning? Here are some tips to get started:  · Plan your meals a week at a time. Don't forget to include snacks too.   · Use cookbooks or online recipes to plan several main meals. Plan some quick meals for busy nights. You also can double some recipes that freeze well. Then you can save half for other busy nights when you don't have time to cook. · Make sure you have the ingredients you need for your recipes. If you're running low on basic items, put these items on your shopping list too. · List foods that you use to make breakfasts, lunches, and snacks. List plenty of fruits and vegetables. · Post this list on the refrigerator. Add to it as you think of more things you need. · Take the list to the store to do your weekly shopping. Follow-up care is a key part of your treatment and safety. Be sure to make and go to all appointments, and call your doctor if you are having problems. It's also a good idea to know your test results and keep a list of the medicines you take. Where can you learn more? Go to http://batshevaTinypasswillie.info/. Stephon Locks in the search box to learn more about \"Learning About Meal Planning for Diabetes. \"  Current as of: April 16, 2019  Content Version: 12.2  © 6471-9930 [employer]. Care instructions adapted under license by [employer] (which disclaims liability or warranty for this information). If you have questions about a medical condition or this instruction, always ask your healthcare professional. [address] any warranty or liability for your use of this information. Counting Carbohydrates: Care Instructions  Your Care Instructions    You don't have to eat special foods when you have diabetes. You just have to be careful to eat healthy foods. Carbohydrates (carbs) raise blood sugar higher and quicker than any other nutrient. Carbs are found in desserts, breads and cereals, and fruit. They're also in starchy vegetables. These include potatoes, corn, and grains such as rice and pasta. Carbs are also in milk and yogurt.   The more carbs you eat at one time, the higher your blood sugar will rise. Spreading carbs all through the day helps keep your blood sugar levels within your target range. Counting carbs is one of the best ways to keep your blood sugar under control. If you use insulin, counting carbs helps you match the right amount of insulin to the number of grams of carbs in a meal. Then you can change your diet and insulin dose as needed. Testing your blood sugar several times a day can help you learn how carbs affect your blood sugar. A registered dietitian or certified diabetes educator can help you plan meals and snacks. Follow-up care is a key part of your treatment and safety. Be sure to make and go to all appointments, and call your doctor if you are having problems. It's also a good idea to know your test results and keep a list of the medicines you take. How can you care for yourself at home? Know your daily amount of carbohydrates  Your daily amount depends on several things, such as your weight, how active you are, which diabetes medicines you take, and what your goals are for your blood sugar levels. A registered dietitian or certified diabetes educator can help you plan how many carbs to include in each meal and snack. For most adults, a guideline for the daily amount of carbs is:  · 45 to 60 grams at each meal. That's about the same as 3 to 4 carbohydrate servings. · 15 to 20 grams at each snack. That's about the same as 1 carbohydrate serving. Count carbs  Counting carbs lets you know how much rapid-acting insulin to take before you eat. If you use an insulin pump, you get a constant rate of insulin during the day. So the pump must be programmed at meals. This gives you extra insulin to cover the rise in blood sugar after meals. If you take insulin:  · Learn your own insulin-to-carb ratio. You and your diabetes health professional will figure out the ratio. You can do this by testing your blood sugar after meals.  For example, you may need a certain amount of insulin for every 15 grams of carbs. · Add up the carb grams in a meal. Then you can figure out how many units of insulin to take based on your insulin-to-carb ratio. · Exercise lowers blood sugar. You can use less insulin than you would if you were not doing exercise. Keep in mind that timing matters. If you exercise within 1 hour after a meal, your body may need less insulin for that meal than it would if you exercised 3 hours after the meal. Test your blood sugar to find out how exercise affects your need for insulin. If you do or don't take insulin:  · Look at labels on packaged foods. This can tell you how many carbs are in a serving. You can also use guides from the American Diabetes Association. · Be aware of portions, or serving sizes. If a package has two servings and you eat the whole package, you need to double the number of grams of carbohydrate listed for one serving. · Protein, fat, and fiber do not raise blood sugar as much as carbs do. If you eat a lot of these nutrients in a meal, your blood sugar will rise more slowly than it would otherwise. Eat from all food groups  · Eat at least three meals a day. · Plan meals to include food from all the food groups. The food groups include grains, fruits, dairy, proteins, and vegetables. · Talk to your dietitian or diabetes educator about ways to add limited amounts of sweets into your meal plan. · If you drink alcohol, talk to your doctor. It may not be recommended when you are taking certain diabetes medicines. Where can you learn more? Go to http://batsheva-willie.info/. Enter U147 in the search box to learn more about \"Counting Carbohydrates: Care Instructions. \"  Current as of: April 16, 2019  Content Version: 12.2  © 0449-1514 [employer]. Care instructions adapted under license by [employer] (which disclaims liability or warranty for this information).  If you have questions about a medical condition or this instruction, always ask your healthcare professional. [address] any warranty or liability for your use of this information. Nutrition Tips for Diabetes: After Your Visit  Your Care Instructions  A healthy diet is important to manage diabetes. It helps you lose weight (if you need to) and keep it off. It gives you the nutrition and energy your body needs and helps prevent heart disease. But a diet for diabetes does not mean that you have to eat special foods. You can eat what your family eats, including occasional sweets and other favorites. But you do have to pay attention to how often you eat and how much you eat of certain foods. The right plan for you will give you meals that help you keep your blood sugar at healthy levels. Try to eat a variety of foods and to spread carbohydrate throughout the day. Carbohydrate raises blood sugar higher and more quickly than any other nutrient does. Carbohydrate is found in sugar, breads and cereals, fruit, starchy vegetables such as potatoes and corn, and milk and yogurt. You may want to work with a dietitian or diabetes educator to help you plan meals and snacks. A dietitian or diabetes educator also can help you lose weight if that is one of your goals. The following tips can help you enjoy your meals and stay healthy. Follow-up care is a key part of your treatment and safety. Be sure to make and go to all appointments, and call your doctor if you are having problems. Its also a good idea to know your test results and keep a list of the medicines you take. How can you care for yourself at home? · Learn which foods have carbohydrate and how much carbohydrate to eat. A dietitian or diabetes educator can help you learn to keep track of how much carbohydrate you eat. · Spread carbohydrate throughout the day. Eat some carbohydrate at all meals, but do not eat too much at any one time.   · Plan meals to include food from all the food groups. These are the food groups and some example portion sizes:  ¨ Grains: 1 slice of bread (1 ounce), ½ cup of cooked cereal, and 1/3 cup of cooked pasta or rice. These have about 15 grams of carbohydrate in a serving. Choose whole grains such as whole wheat bread or crackers, oatmeal, and brown rice more often than refined grains. ¨ Fruit: 1 small fresh fruit, such as an apple or orange; ½ of a banana; ½ cup of chopped, cooked, or canned fruit; ½ cup of fruit juice; 1 cup of melon or raspberries; and 2 tablespoons of dried fruit. These have about 15 grams of carbohydrate in a serving. ¨ Dairy: 1 cup of nonfat or low-fat milk and 2/3 cup of plain yogurt. These have about 15 grams of carbohydrate in a serving. ¨ Protein foods: Beef, chicken, turkey, fish, eggs, tofu, cheese, cottage cheese, and peanut butter. A serving size of meat is 3 ounces, which is about the size of a deck of cards. Examples of meat substitute serving sizes (equal to 1 ounce of meat) are 1/4 cup of cottage cheese, 1 egg, 1 tablespoon of peanut butter, and ½ cup of tofu. These have very little or no carbohydrate per serving. ¨ Vegetables: Starchy vegetables such as ½ cup of cooked dried beans, peas, potatoes, or corn have about 15 grams of carbohydrate. Nonstarchy vegetables have very little carbohydrate, such as 1 cup of raw leafy vegetables (such as spinach), ½ cup of other vegetables (cooked or chopped), and 3/4 cup of vegetable juice. · Use the plate format to plan meals. It is a good, quick way to make sure that you have a balanced meal. It also helps you spread carbohydrate throughout the day. You divide your plate by types of foods. Put vegetables on half the plate, meat or meat substitutes on one-quarter of the plate, and a grain or starchy vegetable (such as brown rice or a potato) in the final quarter of the plate.  To this you can add a small piece of fruit and 1 cup of milk or yogurt, depending on how much carbohydrate you are supposed to eat at a meal.  · Talk to your dietitian or diabetes educator about ways to add limited amounts of sweets into your meal plan. You can eat these foods now and then, as long as you include the amount of carbohydrate they have in your daily carbohydrate allowance. · If you drink alcohol, limit it to no more than 1 drink a day for women and 2 drinks a day for men. If you are pregnant, no amount of alcohol is known to be safe. · Protein, fat, and fiber do not raise blood sugar as much as carbohydrate does. If you eat a lot of these nutrients in a meal, your blood sugar will rise more slowly than it would otherwise. · Limit saturated fats, such as those from meat and dairy products. Try to replace it with monounsaturated fat, such as olive oil. This is a healthier choice because people who have diabetes are at higher-than-average risk of heart disease. But use a modest amount of olive oil. A tablespoon of olive oil has 14 grams of fat and 120 calories. · Exercise lowers blood sugar. If you take insulin by shots or pump, you can use less than you would if you were not exercising. Keep in mind that timing matters. If you exercise within 1 hour after a meal, your body may need less insulin for that meal than it would if you exercised 3 hours after the meal. Test your blood sugar to find out how exercise affects your need for insulin. · Exercise on most days of the week. Aim for at least 30 minutes. Exercise helps you stay at a healthy weight and helps your body use insulin. Walking is an easy way to get exercise. Gradually increase the amount you walk every day. You also may want to swim, bike, or do other activities. When you eat out  · Learn to estimate the serving sizes of foods that have carbohydrate. If you measure food at home, it will be easier to estimate the amount in a serving of restaurant food.   · If the meal you order has too much carbohydrate (such as potatoes, corn, or baked beans), ask to have a low-carbohydrate food instead. Ask for a salad or green vegetables. · If you use insulin, check your blood sugar before and after eating out to help you plan how much to eat in the future. · If you eat more carbohydrate at a meal than you had planned, take a walk or do other exercise. This will help lower your blood sugar. Where can you learn more? Go to [employer]  Enter Q212 in the search box to learn more about \"Nutrition Tips for Diabetes: After Your Visit. \"   © 3834-4135 Healthwise, Incorporated. Care instructions adapted under license by [address] [place] [address] (which disclaims liability or warranty for this information). This care instruction is for use with your licensed healthcare professional. If you have questions about a medical condition or this instruction, always ask your healthcare professional. [place] [address] any warranty or liability for your use of this information.   Content Version: 26.8.691427; Current as of: June 4, 2014

## 2020-02-17 RX ORDER — AMOXICILLIN 250 MG/1
CAPSULE ORAL 3 TIMES DAILY
COMMUNITY
End: 2020-02-27

## 2020-02-17 RX ORDER — HYDROCODONE BITARTRATE AND ACETAMINOPHEN 10; 300 MG/1; MG/1
TABLET ORAL
COMMUNITY
End: 2020-02-27

## 2020-02-19 DIAGNOSIS — I10 ESSENTIAL HYPERTENSION: ICD-10-CM

## 2020-02-19 RX ORDER — HYDROCHLOROTHIAZIDE 25 MG/1
TABLET ORAL
Qty: 90 TAB | Refills: 1 | Status: SHIPPED | OUTPATIENT
Start: 2020-02-19 | End: 2020-08-10

## 2020-02-20 DIAGNOSIS — G47.00 INSOMNIA, UNSPECIFIED TYPE: ICD-10-CM

## 2020-02-20 DIAGNOSIS — F41.9 ANXIETY AND DEPRESSION: ICD-10-CM

## 2020-02-20 DIAGNOSIS — F32.A ANXIETY AND DEPRESSION: ICD-10-CM

## 2020-02-21 RX ORDER — TRAZODONE HYDROCHLORIDE 100 MG/1
100 TABLET ORAL
Qty: 90 TAB | Refills: 1 | Status: SHIPPED | OUTPATIENT
Start: 2020-02-21 | End: 2020-05-08 | Stop reason: SDUPTHER

## 2020-02-24 ENCOUNTER — HOSPITAL ENCOUNTER (OUTPATIENT)
Dept: LAB | Age: 55
Discharge: HOME | End: 2020-02-24

## 2020-02-24 ENCOUNTER — HOSPITAL ENCOUNTER (OUTPATIENT)
Dept: LAB | Age: 55
Discharge: HOME | End: 2020-02-24
Payer: MEDICAID

## 2020-02-24 ENCOUNTER — HOSPITAL ENCOUNTER (OUTPATIENT)
Dept: PREADMISSION TESTING | Age: 55
Discharge: HOME | End: 2020-02-24
Payer: MEDICAID

## 2020-02-24 DIAGNOSIS — M75.21 BICEPS TENDINITIS OF RIGHT SHOULDER: ICD-10-CM

## 2020-02-24 DIAGNOSIS — S46.011A TRAUMATIC COMPLETE TEAR OF RIGHT ROTATOR CUFF, INITIAL ENCOUNTER: ICD-10-CM

## 2020-02-24 DIAGNOSIS — Z01.810 PREOP CARDIOVASCULAR EXAM: ICD-10-CM

## 2020-02-24 LAB
ATRIAL RATE: 74 BPM
CALCULATED P AXIS, ECG09: 67 DEGREES
CALCULATED R AXIS, ECG10: -7 DEGREES
CALCULATED T AXIS, ECG11: 26 DEGREES
DIAGNOSIS, 93000: NORMAL
P-R INTERVAL, ECG05: 190 MS
Q-T INTERVAL, ECG07: 384 MS
QRS DURATION, ECG06: 102 MS
QTC CALCULATION (BEZET), ECG08: 426 MS
SENTARA SPECIMEN COL,SENBCF: NORMAL
VENTRICULAR RATE, ECG03: 74 BPM

## 2020-02-24 PROCEDURE — 99001 SPECIMEN HANDLING PT-LAB: CPT

## 2020-02-24 PROCEDURE — 93005 ELECTROCARDIOGRAM TRACING: CPT

## 2020-02-27 PROBLEM — E78.5 HYPERLIPIDEMIA: Status: ACTIVE | Noted: 2020-02-27

## 2020-02-27 PROBLEM — J30.89 NON-SEASONAL ALLERGIC RHINITIS: Status: ACTIVE | Noted: 2020-02-27

## 2020-02-27 PROBLEM — E11.9 TYPE 2 DIABETES MELLITUS WITHOUT COMPLICATION, WITHOUT LONG-TERM CURRENT USE OF INSULIN (HCC): Status: ACTIVE | Noted: 2020-02-27

## 2020-02-27 PROBLEM — I10 ESSENTIAL HYPERTENSION WITH GOAL BLOOD PRESSURE LESS THAN 130/80: Status: ACTIVE | Noted: 2020-02-27

## 2020-02-28 ENCOUNTER — OFFICE VISIT (OUTPATIENT)
Dept: ORTHOPEDIC SURGERY | Facility: CLINIC | Age: 55
End: 2020-02-28

## 2020-02-28 VITALS
DIASTOLIC BLOOD PRESSURE: 77 MMHG | BODY MASS INDEX: 38.55 KG/M2 | HEART RATE: 96 BPM | OXYGEN SATURATION: 97 % | SYSTOLIC BLOOD PRESSURE: 111 MMHG | RESPIRATION RATE: 16 BRPM | HEIGHT: 64 IN | WEIGHT: 225.8 LBS | TEMPERATURE: 98.8 F

## 2020-02-28 DIAGNOSIS — M75.21 BICEPS TENDINITIS OF RIGHT SHOULDER: ICD-10-CM

## 2020-02-28 DIAGNOSIS — S46.011A TRAUMATIC COMPLETE TEAR OF RIGHT ROTATOR CUFF, INITIAL ENCOUNTER: Primary | ICD-10-CM

## 2020-02-28 NOTE — PROGRESS NOTES
1. Have you been to the ER, urgent care clinic since your last visit? Hospitalized since your last visit? No    2. Have you seen or consulted any other health care providers outside of the [address] [place] [address] since your last visit? Include any pap smears or colon screening.  No

## 2020-02-28 NOTE — H&P
HISTORY AND PHYSICAL          Patient: Rashaad Rowe                MRN: [id number]       SSN: [id number]  YOB: 1965          AGE: 48 y.o. SEX: female      Patient scheduled for:  RIGHT SHOULDER ARTHROSCOPIC ROTATOR CUFF REPAIR, POSSIBLE BICEPS TENODESIS OR TENOTOMY    Surgeon: Matthew Antoine MD    ANESTHESIA TYPE:  General    HISTORY:     The patient was seen in the office today for a preoperative history and physical for an upcoming above listed surgery. The patient is a pleasant 48 y.o. female who has a history of right shoulder. She has continued to have right shoulder pain. She had her MRI done. Unfortunately, since her last visit, she has had a few falls where her shoulder pain has worsened. She has pain with reaching overhead. She has difficulty with use of her arm due to pain. She has pain at night. Due to the current findings, affected activity of daily living and continued pain and discomfort, surgical intervention is indicated. The alternatives, risks, and complications, including but not limited to infection, blood loss, need for blood transfusion, neurovascular damage, dimitry-incisional numbness, subcutaneous hematoma, bone fracture, anesthetic complications, DVT, PE, death, RSD, postoperative stiffness and pain, possible surgical scar, delayed healing and nonhealing, reflexive sympathetic dystrophy, damage to blood vessels and nerves, need for more surgery, MI, and stroke,  failure of hardware, gait disturbances,have been discussed. The patient understands and wishes to proceed with surgery.      PAST MEDICAL HISTORY:     Past Medical History:   Diagnosis Date    Allergic rhinitis     Anxiety and depression     Asthma 11/3/2010    Chronic pain in right shoulder     Depression 10/19/2015    Diabetes ([address]     Eczema 10/19/2015    Head pain 4/28/2014    HTN (hypertension)     Multiple environmental allergies     Nicotine dependence     Syphilis in female 3/2/2015       CURRENT MEDICATIONS:     Current Outpatient Medications   Medication Sig Dispense Refill    traZODone (DESYREL) 100 mg tablet Take 1 Tab by mouth nightly. 90 Tab 1    hydroCHLOROthiazide (HYDRODIURIL) 25 mg tablet TAKE 1 TABLET BY MOUTH EVERY DAY 90 Tab 1    metFORMIN ER (GLUCOPHAGE XR) 500 mg tablet Take 1 Tab by mouth daily (with dinner). 30 Tab 5    fexofenadine (ALLEGRA) 180 mg tablet Take 1 Tab by mouth daily. Indications: inflammation of the nose due to an allergy 90 Tab 5    losartan (COZAAR) 25 mg tablet Take 1 Tab by mouth daily. 30 Tab 2    famotidine (PEPCID) 20 mg tablet Take 1 Tab by mouth two (2) times a day. 60 Tab 5    buPROPion SR (WELLBUTRIN SR) 150 mg SR tablet Take 1 Tab by mouth two (2) times a day. 60 Tab 5    pravastatin (PRAVACHOL) 40 mg tablet Take 1 Tab by mouth nightly. 30 Tab 5    ketoconazole (NIZORAL) 2 % topical cream Apply once daily to cover the affected and immediate surrounding area for 2 weeks 30 g 2    albuterol (PROVENTIL HFA, VENTOLIN HFA, PROAIR HFA) 90 mcg/actuation inhaler Take 2 Puffs by inhalation every six (6) hours as needed for Wheezing. 3 Inhaler 2       ALLERGIES:     Allergies   Allergen Reactions    Aspirin Nausea and Vomiting    Lisinopril Cough    Prednisone Swelling         SURGICAL HISTORY:     Past Surgical History:   Procedure Laterality Date    ENDOSCOPY, COLON, DIAGNOSTIC      HX TUBAL LIGATION Bilateral        SOCIAL HISTORY:     Social History     Socioeconomic History    Marital status:      Spouse name: Not on file    Number of children: Not on file    Years of education: Not on file    Highest education level: Not on file   Tobacco Use    Smoking status: Current Every Day Smoker     Packs/day: 0.50     Types: Cigarettes    Smokeless tobacco: Never Used    Tobacco comment: quit in january, smokes one occ   Substance and Sexual Activity    Alcohol use:  Yes     Alcohol/week: 2.0 standard drinks     Types: 2 Shots of liquor per week    Drug use: No    Sexual activity: Yes     Partners: Male       FAMILY HISTORY:     Family History   Problem Relation Age of Onset    Breast Cancer Maternal Grandmother     Cancer Mother        REVIEW OF SYSTEMS:     Negative for fevers, chills, chest pain, shortness of breath, weight loss, recent illness     General: Negative for fever and chills. No unexpected change in weight. Denies fatigue. No change in appetite. Skin: Negative for rash or itching. HEENT: Negative for congestion, sore throat, neck pain and neck stiffness. No change in vision or hearing. Hasn't noted any enlarged lymph nodes in the neck. Cardiovascular:  Negative for chest pain and palpitations. Has not noted pedal edema. Respiratory: Negative for cough, colds, sinus, hemoptysis, shortness of breath and wheezing. Gastrointestinal: Negative for nausea and vomiting, rectal bleeding, coffee ground emesis, abdominal pain, diarrhea and constipation. Genitourinary: Negative for dysuria, frequency urgency, or burning on micturition. No flank pain, no foul smelling urine, no difficulty with initiating urination. Hematological: Negative for bleeding or easy bruising. Musculoskeletal: Negative  for arthralgias, back pain or neck pain. Neurological: Negative for dizziness, seizures or syncopal episodes. Denies headaches. Endocrine: Denies excessive thirst.  No heat/cold intolerance. Psychiatric: Negative for depression or insomnia. PHYSICAL EXAMINATION:     VITALS:   Visit Vitals  /77   Pulse 96   Temp 98.8 °F (37.1 °C) (Oral)   Resp 16   Ht 5' 4\" (1.626 m)   Wt 225 lb 12.8 oz (102.4 kg)   SpO2 97%   BMI 38.76 kg/m²     GEN:  Well developed, well nourished 48 y.o. female in no acute distress. HEENT: Normocephalic and atraumatic. Eyes: Conjunctivae and EOM are normal.Pupils are equal, round, and reactive to light. External ear normal appearance, external nose normal appearing. Mouth/Throat: Oropharynx is clear and moist, able to handle oral secretions w/out difficulty, airway patent  NECK: Supple. Normal ROM, No lymphadenopathy. Trachea is midline. No bruising, swelling or deformity  RESP: Clear to auscultation bilaterally. No wheezes, rales, rhonchi. Normal effort and breath sounds. No respiratory distress  CARDIO: Normal rate, regular rhythm and normal heart sounds. No MGR. ABDOMEN: Soft, non-tender, non-distended, normoactive bowel sounds in all four quadrants. There is no tenderness. There is no rebound and no guarding. BACK: No CVA or spinal tenderness  BREAST:  Deferred  PELVIC:    Deferred   RECTAL:  Deferred   :           Deferred  EXTREMITIES: EXAMINATION OF: right shoulder  Physical exam of the right shoulder with full passive range of motion. She has pain with forward flexion, as well as pain bringing her arm from a position of forward flexion down. She has pain with biceps stress testing. She has pain and weakness with supraspinatus rotator cuff stress testing. NEUROVASCULAR: Sensation intact to light touch and strength grossly intact and symmetrical. No nystagmus. Positive distal pulses and capillary refill. DVT ASSESSMENT:  There is not  calf tenderness. No evidence of DVT seen on physical exam.  MOTOR: In tact  PSYCH: Alert an oriented to person, place and time. Mood, memory, affect, behavior and judgment normal       RADIOGRAPHS & DIAGNOSTIC STUDIES:     MRI/xray reveals : An MRI of the right shoulder was reviewed, which shows a supraspinatus rotator cuff tear anterior aspect, which appears to be full thickness on several of the cuts. LABS:       Preoperative labs were reviewed and are substantially within normal limits  EKG:     Normal sinus rhythm   Non-specific ST/T wave changes   Borderline ECG   When compared with ECG of 22-JUL-2016 20:51,   No significant change was found       ASSESSMENT:       No diagnosis found.     PLAN:     Again, the alternatives, risks, and complications, as well as expected outcome were discussed. The patient understands and agrees to proceed with RIGHT SHOULDER ARTHROSCOPIC ROTATOR CUFF REPAIR, POSSIBLE BICEPS TENODESIS OR TENOTOMY. Patient given orders listed below:    No orders of the defined types were placed in this encounter.         Rocio Paredes PA-C  2/28/2020  2:26 PM

## 2020-03-03 ENCOUNTER — HOSPITAL ENCOUNTER (OUTPATIENT)
Age: 55
Setting detail: OUTPATIENT SURGERY
Discharge: HOME | End: 2020-03-03
Attending: ORTHOPAEDIC SURGERY | Admitting: ORTHOPAEDIC SURGERY
Payer: MEDICAID

## 2020-03-03 VITALS
SYSTOLIC BLOOD PRESSURE: 123 MMHG | RESPIRATION RATE: 17 BRPM | HEIGHT: 64 IN | OXYGEN SATURATION: 98 % | HEART RATE: 89 BPM | BODY MASS INDEX: 38.41 KG/M2 | WEIGHT: 225 LBS | TEMPERATURE: 98.6 F | DIASTOLIC BLOOD PRESSURE: 73 MMHG

## 2020-03-03 LAB
AMPHET UR QL SCN: NEGATIVE
BARBITURATES UR QL SCN: NEGATIVE
BENZODIAZ UR QL: NEGATIVE
CANNABINOIDS UR QL SCN: NEGATIVE
COCAINE UR QL SCN: POSITIVE
GLUCOSE BLD STRIP.AUTO-MCNC: 106 MG/DL (ref 70–110)
HCG UR QL: NEGATIVE
HDSCOM,HDSCOM: ABNORMAL
METHADONE UR QL: NEGATIVE
OPIATES UR QL: NEGATIVE
PCP UR QL: NEGATIVE

## 2020-03-03 PROCEDURE — 64450 NJX AA&/STRD OTHER PN/BRANCH: CPT | Performed by: ANESTHESIOLOGY

## 2020-03-03 PROCEDURE — 77030010427: Performed by: ORTHOPAEDIC SURGERY

## 2020-03-03 PROCEDURE — 77030040922 HC BLNKT HYPOTHRM STRY -A: Performed by: ORTHOPAEDIC SURGERY

## 2020-03-03 PROCEDURE — 77030040361 HC SLV COMPR DVT MDII -B: Performed by: ORTHOPAEDIC SURGERY

## 2020-03-03 PROCEDURE — 77030008574 HC TBNG SUC IRR STRY -B: Performed by: ORTHOPAEDIC SURGERY

## 2020-03-03 PROCEDURE — 77030018836 HC SOL IRR NACL ICUM -A: Performed by: ORTHOPAEDIC SURGERY

## 2020-03-03 PROCEDURE — 80307 DRUG TEST PRSMV CHEM ANLYZR: CPT

## 2020-03-03 PROCEDURE — 82962 GLUCOSE BLOOD TEST: CPT

## 2020-03-03 PROCEDURE — 81025 URINE PREGNANCY TEST: CPT

## 2020-03-03 PROCEDURE — 77030004453 HC BUR SHV STRY -B: Performed by: ORTHOPAEDIC SURGERY

## 2020-03-03 PROCEDURE — 77030017964 HC PRB COAG4 STRY -C: Performed by: ORTHOPAEDIC SURGERY

## 2020-03-03 PROCEDURE — 74011250637 HC RX REV CODE- 250/637: Performed by: NURSE ANESTHETIST, CERTIFIED REGISTERED

## 2020-03-03 PROCEDURE — 74011250636 HC RX REV CODE- 250/636: Performed by: NURSE ANESTHETIST, CERTIFIED REGISTERED

## 2020-03-03 PROCEDURE — 76942 ECHO GUIDE FOR BIOPSY: CPT | Performed by: ANESTHESIOLOGY

## 2020-03-03 PROCEDURE — 77030002916 HC SUT ETHLN J&J -A: Performed by: ORTHOPAEDIC SURGERY

## 2020-03-03 PROCEDURE — 77030022036 HC BLD SHV TOMCAT STRY -B: Performed by: ORTHOPAEDIC SURGERY

## 2020-03-03 RX ORDER — FENTANYL CITRATE 50 UG/ML
100 INJECTION, SOLUTION INTRAMUSCULAR; INTRAVENOUS ONCE
Status: DISCONTINUED | OUTPATIENT
Start: 2020-03-03 | End: 2020-03-03 | Stop reason: HOSPADM

## 2020-03-03 RX ORDER — MIDAZOLAM HYDROCHLORIDE 1 MG/ML
2 INJECTION, SOLUTION INTRAMUSCULAR; INTRAVENOUS ONCE
Status: DISCONTINUED | OUTPATIENT
Start: 2020-03-03 | End: 2020-03-03 | Stop reason: HOSPADM

## 2020-03-03 RX ORDER — FAMOTIDINE 20 MG/1
20 TABLET, FILM COATED ORAL ONCE
Status: COMPLETED | OUTPATIENT
Start: 2020-03-03 | End: 2020-03-03

## 2020-03-03 RX ORDER — SODIUM CHLORIDE, SODIUM LACTATE, POTASSIUM CHLORIDE, CALCIUM CHLORIDE 600; 310; 30; 20 MG/100ML; MG/100ML; MG/100ML; MG/100ML
75 INJECTION, SOLUTION INTRAVENOUS CONTINUOUS
Status: DISCONTINUED | OUTPATIENT
Start: 2020-03-03 | End: 2020-03-03 | Stop reason: HOSPADM

## 2020-03-03 RX ORDER — CEFAZOLIN SODIUM 2 G/50ML
2 SOLUTION INTRAVENOUS ONCE
Status: DISCONTINUED | OUTPATIENT
Start: 2020-03-03 | End: 2020-03-03 | Stop reason: SDUPTHER

## 2020-03-03 RX ORDER — SODIUM CHLORIDE 0.9 % (FLUSH) 0.9 %
5-40 SYRINGE (ML) INJECTION EVERY 8 HOURS
Status: DISCONTINUED | OUTPATIENT
Start: 2020-03-03 | End: 2020-03-03 | Stop reason: HOSPADM

## 2020-03-03 RX ORDER — SODIUM CHLORIDE 0.9 % (FLUSH) 0.9 %
5-40 SYRINGE (ML) INJECTION AS NEEDED
Status: DISCONTINUED | OUTPATIENT
Start: 2020-03-03 | End: 2020-03-03 | Stop reason: HOSPADM

## 2020-03-03 RX ORDER — CEFAZOLIN SODIUM 2 G/50ML
2 SOLUTION INTRAVENOUS ONCE
Status: DISCONTINUED | OUTPATIENT
Start: 2020-03-03 | End: 2020-03-03 | Stop reason: HOSPADM

## 2020-03-03 RX ORDER — ROPIVACAINE HYDROCHLORIDE 5 MG/ML
30 INJECTION, SOLUTION EPIDURAL; INFILTRATION; PERINEURAL
Status: DISCONTINUED | OUTPATIENT
Start: 2020-03-03 | End: 2020-03-03 | Stop reason: HOSPADM

## 2020-03-03 RX ORDER — INSULIN LISPRO 100 [IU]/ML
INJECTION, SOLUTION INTRAVENOUS; SUBCUTANEOUS ONCE
Status: DISCONTINUED | OUTPATIENT
Start: 2020-03-03 | End: 2020-03-03 | Stop reason: HOSPADM

## 2020-03-03 RX ORDER — LIDOCAINE HYDROCHLORIDE 10 MG/ML
0.1 INJECTION, SOLUTION EPIDURAL; INFILTRATION; INTRACAUDAL; PERINEURAL AS NEEDED
Status: DISCONTINUED | OUTPATIENT
Start: 2020-03-03 | End: 2020-03-03 | Stop reason: HOSPADM

## 2020-03-03 RX ADMIN — FAMOTIDINE 20 MG: 20 TABLET ORAL at 07:35

## 2020-03-03 RX ADMIN — SODIUM CHLORIDE, SODIUM LACTATE, POTASSIUM CHLORIDE, AND CALCIUM CHLORIDE 75 ML/HR: 600; 310; 30; 20 INJECTION, SOLUTION INTRAVENOUS at 07:35

## 2020-03-04 DIAGNOSIS — F32.A ANXIETY AND DEPRESSION: ICD-10-CM

## 2020-03-04 DIAGNOSIS — F41.9 ANXIETY AND DEPRESSION: ICD-10-CM

## 2020-03-04 DIAGNOSIS — F17.200 NICOTINE DEPENDENCE, UNCOMPLICATED, UNSPECIFIED NICOTINE PRODUCT TYPE: ICD-10-CM

## 2020-03-05 LAB — HBA1C MFR BLD HPLC: 8.7 %

## 2020-03-06 RX ORDER — BUPROPION HYDROCHLORIDE 150 MG/1
150 TABLET, EXTENDED RELEASE ORAL 2 TIMES DAILY
Qty: 180 TAB | Refills: 2 | Status: SHIPPED | OUTPATIENT
Start: 2020-03-06 | End: 2021-01-29

## 2020-03-10 DIAGNOSIS — Z01.818 PREOPERATIVE TESTING: Primary | ICD-10-CM

## 2020-03-13 ENCOUNTER — HOSPITAL ENCOUNTER (OUTPATIENT)
Dept: LAB | Age: 55
Discharge: HOME | End: 2020-03-13

## 2020-03-13 LAB — SENTARA SPECIMEN COL,SENBCF: NORMAL

## 2020-03-13 PROCEDURE — 99001 SPECIMEN HANDLING PT-LAB: CPT

## 2020-03-16 DIAGNOSIS — Z01.818 PREOPERATIVE TESTING: ICD-10-CM

## 2020-03-18 ENCOUNTER — TELEPHONE (OUTPATIENT)
Dept: ORTHOPEDIC SURGERY | Facility: CLINIC | Age: 55
End: 2020-03-18

## 2020-03-18 DIAGNOSIS — M25.511 CHRONIC RIGHT SHOULDER PAIN: ICD-10-CM

## 2020-03-18 DIAGNOSIS — S46.011A TRAUMATIC COMPLETE TEAR OF RIGHT ROTATOR CUFF, INITIAL ENCOUNTER: Primary | ICD-10-CM

## 2020-03-18 DIAGNOSIS — M75.21 BICEPS TENDINITIS OF RIGHT SHOULDER: ICD-10-CM

## 2020-03-18 DIAGNOSIS — G89.29 CHRONIC RIGHT SHOULDER PAIN: ICD-10-CM

## 2020-03-18 NOTE — TELEPHONE ENCOUNTER
Patient is requesting something for \"discomfort\" so that she \"can sleep at night\". Surgery was delayed due to positive UDS. First random screening was negative on 3/13. Surgery is not rescheduled yet. Please call her at [phone]    Pharmacy is [facility] in [place]: [phone].

## 2020-03-18 NOTE — TELEPHONE ENCOUNTER
I recommend she take OTC NSAIDS, tylenol, ice and rest to help with pain. We can prescribe diclofenac or mobic but that is all at this time.

## 2020-03-19 RX ORDER — DICLOFENAC SODIUM 75 MG/1
75 TABLET, DELAYED RELEASE ORAL DAILY
Qty: 30 TAB | Refills: 2 | Status: SHIPPED | OUTPATIENT
Start: 2020-03-19 | End: 2020-03-23 | Stop reason: SDUPTHER

## 2020-03-23 RX ORDER — DICLOFENAC SODIUM 75 MG/1
75 TABLET, DELAYED RELEASE ORAL DAILY
Qty: 30 TAB | Refills: 2 | Status: SHIPPED | OUTPATIENT
Start: 2020-03-23 | End: 2020-07-13

## 2020-03-25 ENCOUNTER — OFFICE VISIT (OUTPATIENT)
Dept: FAMILY MEDICINE CLINIC | Age: 55
End: 2020-03-25

## 2020-03-25 VITALS
SYSTOLIC BLOOD PRESSURE: 122 MMHG | WEIGHT: 224 LBS | DIASTOLIC BLOOD PRESSURE: 83 MMHG | OXYGEN SATURATION: 95 % | TEMPERATURE: 98.5 F | BODY MASS INDEX: 38.24 KG/M2 | HEIGHT: 64 IN | HEART RATE: 96 BPM | RESPIRATION RATE: 16 BRPM

## 2020-03-25 DIAGNOSIS — E11.9 TYPE 2 DIABETES MELLITUS WITHOUT COMPLICATION, WITHOUT LONG-TERM CURRENT USE OF INSULIN (HCC): ICD-10-CM

## 2020-03-25 DIAGNOSIS — I10 ESSENTIAL HYPERTENSION WITH GOAL BLOOD PRESSURE LESS THAN 130/80: Primary | ICD-10-CM

## 2020-03-25 DIAGNOSIS — J30.89 NON-SEASONAL ALLERGIC RHINITIS, UNSPECIFIED TRIGGER: ICD-10-CM

## 2020-03-25 DIAGNOSIS — E78.5 HYPERLIPIDEMIA, UNSPECIFIED HYPERLIPIDEMIA TYPE: ICD-10-CM

## 2020-03-25 DIAGNOSIS — I10 ESSENTIAL HYPERTENSION WITH GOAL BLOOD PRESSURE LESS THAN 130/80: ICD-10-CM

## 2020-03-25 NOTE — PROGRESS NOTES
[facility]    CC: F/U for Chronic Disease Management    HPI:     Hypertension:  -Taking BP medication as prescribed.    -Denies any issues or side effects with BP medication.  -Not checking BP at home  -Diet is unchanged since last visit  -Currently not following any regular exercise regimen      HLD:  -Taking statin as prescribed  -Denies any side effects or issues with the medication  -Diet is unchanged since last visit  -Currently not following any regular exercise regimen       DMII:  -Taking metformin as prescribed  -Denies any side effects or issues with the medication  -Does not check blood sugar at home  -Diet is unchanged since last visit  -Currently not following any regular exercise regimen      Allergic Rhinitis:  -Reports taking fexofenadine  -States that it helps with issue  -Denies any side effects or issues with the medication      ROS: Positive items marked in RED  CON: fever, chills  Cardiovascular: palpitations, CP  Resp: SOB, cough  GI: nausea, vomiting, diarrhea  : dysuria, hematuria      Past Medical History:   Diagnosis Date    Allergic rhinitis     Anxiety and depression     Asthma 11/3/2010    Chronic pain in right shoulder     Depression 10/19/2015    [facility] [address] [address]     Eczema 10/19/2015    Head pain 4/28/2014    HTN (hypertension)     Multiple environmental allergies     Nicotine dependence     Syphilis in female 3/2/2015       Past Surgical History:   Procedure Laterality Date    ENDOSCOPY, COLON, DIAGNOSTIC      HX TUBAL LIGATION Bilateral        Family History   Problem Relation Age of Onset    Breast Cancer Maternal Grandmother     Cancer Mother        Social History     Socioeconomic History    Marital status:      Spouse name: Not on file    Number of children: Not on file    Years of education: Not on file    Highest education level: Not on file   Tobacco Use    Smoking status: Current Every Day Smoker     Packs/day: 0.50     Types: Cigarettes    Smokeless tobacco: Never Used    Tobacco comment: quit in january, smokes one occ   Substance and Sexual Activity    Alcohol use: Yes     Alcohol/week: 2.0 standard drinks     Types: 2 Shots of liquor per week    Drug use: No    Sexual activity: Yes     Partners: Male       Allergies   Allergen Reactions    Aspirin Nausea and Vomiting    Lisinopril Cough    Prednisone Swelling         Current Outpatient Medications:     buPROPion SR (WELLBUTRIN SR) 150 mg SR tablet, Take 1 Tab by mouth two (2) times a day., Disp: 180 Tab, Rfl: 2    traZODone (DESYREL) 100 mg tablet, Take 1 Tab by mouth nightly., Disp: 90 Tab, Rfl: 1    hydroCHLOROthiazide (HYDRODIURIL) 25 mg tablet, TAKE 1 TABLET BY MOUTH EVERY DAY, Disp: 90 Tab, Rfl: 1    metFORMIN ER (GLUCOPHAGE XR) 500 mg tablet, Take 1 Tab by mouth daily (with dinner). , Disp: 30 Tab, Rfl: 5    losartan (COZAAR) 25 mg tablet, Take 1 Tab by mouth daily. , Disp: 30 Tab, Rfl: 2    pravastatin (PRAVACHOL) 40 mg tablet, Take 1 Tab by mouth nightly., Disp: 30 Tab, Rfl: 5    diclofenac EC (VOLTAREN) 75 mg EC tablet, Take 1 Tab by mouth daily. , Disp: 30 Tab, Rfl: 2    ketoconazole (NIZORAL) 2 % topical cream, Apply once daily to cover the affected and immediate surrounding area for 2 weeks, Disp: 30 g, Rfl: 2    albuterol (PROVENTIL HFA, VENTOLIN HFA, PROAIR HFA) 90 mcg/actuation inhaler, Take 2 Puffs by inhalation every six (6) hours as needed for Wheezing., Disp: 3 Inhaler, Rfl: 2    Physical Exam:      /83   Pulse 96   Temp 98.5 °F (36.9 °C) (Oral)   Resp 16   Ht 5' 4\" (1.626 m)   Wt 224 lb (101.6 kg)   SpO2 95%   BMI 38.45 kg/m²     General: obese habitus, NAD, conversant  Eyes: sclera clear bilaterally, no discharge noted, eyelids normal in appearance  HENT: NCAT  Lungs: CTAB, normal respiratory effort and rate  CV: RRR, no MRGs  ABD: soft, non-tender, non-distended, normal bowel sounds  Skin: normal temperature, turgor, color, and texture  Psych: alert and oriented to person, place and situation, normal affect  Neuro: speech normal, moving all extremities, gait normal      Assessment/Plan     Hypertension:  -Last 10 year ASCVD risk was calculated to be 34.6%  -BP not currently at goal of <130/80.  Was at goal at prior visit on current medication regimen  -Will continue current BP medication regimen  -CMP and fasting lipid panel ordered  -Plan to discuss possibly adjusting BP medication at next visit if BP remains not at goal  -Follow-up in 1 month        Hyperlipidemia:  -Last 10 year ASCVD risk was calculated to be 34.6%  -Will continue current pravastatin regimen  -CMP and fasting lipid panel ordered  -Plan to discuss starting on high intensity statin regimen if ASCVD risk remains high  -Follow-up in 1 month        DMII:  -Will continue current metformin regimen  -HGBA1c, CMP, and fasting lipid panel ordered  -Handouts given on diabetic blood sugar emergencies  -Follow-up in 1 month        Allergic Rhinitis, improving:  -Will continue current Allegra regimen  -Follow-up in 1 month        William Mendenhall MD  3/25/2020, 11:14 AM

## 2020-03-25 NOTE — PATIENT INSTRUCTIONS
Diabetes Blood Sugar Emergencies: Your Action Plan  How can you prevent a blood sugar emergency? An important part of living with diabetes is keeping your blood sugar in your target range. You'll need to know what to do if it's too high or too low. Managing your blood sugar levels helps you avoid emergencies. This care sheet will teach you about the signs of high and low blood sugar. It will help you make an action plan with your doctor for when these signs occur. Low blood sugar is more likely to happen if you take certain medicines for diabetes. It can also happen if you skip a meal, drink alcohol, or exercise more than usual.  You may get high blood sugar if you eat differently than you normally do. One example is eating more carbohydrate than usual. Having a cold, the flu, or other sudden illness can also cause high blood sugar levels. Levels can also rise if you miss a dose of medicine. Any change in how you take your medicine may affect your blood sugar level. So it's important to work with your doctor before you make any changes. Check your blood sugar  Work with your doctor to fill in the blank spaces below that apply to you. Track your levels, know your target range, and write down ways you can get your blood sugar back in your target range. A log book can help you track your levels. Take the book to all of your medical appointments. · Check your blood sugar _____ times a day, at these times:________________________________________________. (For example: Before meals, at bedtime, before exercise, during exercise, other.)  · Your blood sugar target range before a meal is ___________________. Your blood sugar target range after a meal is _______________________. · Do this--___________________________________________________--to get your blood sugar back within your safe range if your blood sugar results are _________________________________________.  (For example: Less than 70 or above 250 mg/dL.)  Call your doctor when your blood sugar results are ___________________________________. (For example: Less than 70 or above 250 mg/dL.)  What are the symptoms of low and high blood sugar? Common symptoms of low blood sugar are sweating and feeling shaky, weak, hungry, or confused. Symptoms can start quickly. Common symptoms of high blood sugar are feeling very thirsty or very hungry. You may also pass urine more often than usual. You may have blurry vision and may lose weight without trying. But some people may have high or low blood sugar without having any symptoms. That's a good reason to check your blood sugar on a regular schedule. What should you do if you have symptoms? Work with your doctor to fill in the blank spaces below that apply to you. Low blood sugar  If you have symptoms of low blood sugar, check your blood sugar. If it's below _____ ( for example, below 70), eat or drink a quick-sugar food that has about 15 grams of carbohydrate. Your goal is to get your level back to your safe range. Check your blood sugar again 15 minutes later. If it's still not in your target range, take another 15 grams of carbohydrate and check your blood sugar again in 15 minutes. Repeat this until you reach your target. Then go back to your regular testing schedule. Children usually need less than 15 grams of carbohydrate. Check with your doctor or diabetes educator for the amount that is right for your child. When you have low blood sugar, it's best to stop or reduce any physical activity until your blood sugar is back in your target range and is stable. If you must stay active, eat or drink 30 grams of carbohydrate. Then check your blood sugar again in 15 minutes. If it's not in your target range, take another 30 grams of carbohydrates. Check your blood sugar again in 15 minutes. Keep doing this until you reach your target. You can then go back to your regular testing schedule.   If your symptoms or blood sugar levels are getting worse or have not improved after 15 minutes, seek medical care right away. Here are some examples of quick-sugar foods with 15 grams of carbohydrate:  · 3 or 4 glucose tablets  · 1 tablespoon (3 teaspoons) table sugar  · ½ cup to ¾ cup (4 to 6 ounces) of fruit juice or regular (not diet) soda  · Hard candy (such as 6 Life Savers)  High blood sugar  If you have symptoms of high blood sugar, check your blood sugar. Your goal is to get your level back to your target range. If it's above ______ ( for example, above 250), follow these steps:  · If you missed a dose of your diabetes medicine, take it now. Take only the amount of medicine that you have been prescribed. Do not take more or less medicine. · Give yourself insulin if your doctor has prescribed it for high blood sugar. · Test for ketones, if the doctor told you to do so. If the results of the ketone test show a moderate-to-large amount of ketones, call the doctor for advice. · Wait 30 minutes after you take the extra insulin or the missed medicine. Check your blood sugar again. If your symptoms or blood sugar levels are getting worse or have not improved after taking these steps, seek medical care right away. Follow-up care is a key part of your treatment and safety. Be sure to make and go to all appointments, and call your doctor if you are having problems. It's also a good idea to know your test results and keep a list of the medicines you take. Where can you learn more? Go to http://batsheva-willie.info/  Enter A271 in the search box to learn more about \"Diabetes Blood Sugar Emergencies: Your Action Plan. \"  Current as of: December 19, 2019Content Version: 12.4  © 2803-0934 Healthwise, Incorporated. Care instructions adapted under license by [employer] (which disclaims liability or warranty for this information).  If you have questions about a medical condition or this instruction, always ask your healthcare professional. [address] any warranty or liability for your use of this information.

## 2020-04-02 DIAGNOSIS — I10 ESSENTIAL HYPERTENSION WITH GOAL BLOOD PRESSURE LESS THAN 130/80: ICD-10-CM

## 2020-04-02 NOTE — TELEPHONE ENCOUNTER
Requested Prescriptions     Pending Prescriptions Disp Refills    losartan (COZAAR) 25 mg tablet 30 Tab 2     Sig: Take 1 Tab by mouth daily.

## 2020-04-03 RX ORDER — LOSARTAN POTASSIUM 25 MG/1
25 TABLET ORAL DAILY
Qty: 90 TAB | Refills: 1 | Status: SHIPPED | OUTPATIENT
Start: 2020-04-03 | End: 2020-04-06 | Stop reason: RX

## 2020-04-06 ENCOUNTER — TELEPHONE (OUTPATIENT)
Dept: FAMILY MEDICINE CLINIC | Age: 55
End: 2020-04-06

## 2020-04-06 RX ORDER — IRBESARTAN 75 MG/1
75 TABLET ORAL
Qty: 90 TAB | Refills: 1 | Status: SHIPPED | OUTPATIENT
Start: 2020-04-06 | End: 2020-07-10

## 2020-04-06 NOTE — TELEPHONE ENCOUNTER
Spoke with Dr. Jazmyn Cueto, he is changing the Losartan - told pt to check with Pharmacy- As far as the shoulder pain- informed pt that Dr. Jazmyn Cueto gave pt Diclofenac Tab for her shoulder pain.

## 2020-04-06 NOTE — TELEPHONE ENCOUNTER
[facility] is telling the patient they are out of stock for her losartan (COZAAR) 25 mg tablet, there for a replacement is needed. Patient is also stating she waiting for pain medicine for has shoulder as stated during her visit.   Call her back at [phone]

## 2020-05-08 DIAGNOSIS — F41.9 ANXIETY AND DEPRESSION: ICD-10-CM

## 2020-05-08 DIAGNOSIS — F32.A ANXIETY AND DEPRESSION: ICD-10-CM

## 2020-05-08 DIAGNOSIS — G47.00 INSOMNIA, UNSPECIFIED TYPE: ICD-10-CM

## 2020-05-11 RX ORDER — TRAZODONE HYDROCHLORIDE 100 MG/1
100 TABLET ORAL
Qty: 90 TAB | Refills: 1 | Status: SHIPPED | OUTPATIENT
Start: 2020-05-11 | End: 2020-10-30 | Stop reason: DRUGHIGH

## 2020-05-12 ENCOUNTER — VIRTUAL VISIT (OUTPATIENT)
Dept: FAMILY MEDICINE CLINIC | Age: 55
End: 2020-05-12

## 2020-05-12 NOTE — PROGRESS NOTES
1. Have you been to the ER, urgent care clinic since your last visit? Hospitalized since your last visit? No    2. Have you seen or consulted any other health care providers outside of the [address] [place] [address] since your last visit? Include any pap smears or colon screening. No      A user error has taken place: encounter opened in error, closed for administrative reasons.

## 2020-05-12 NOTE — PROGRESS NOTES
[address] [facility] *** HPI:  
 
Consent: Brandon Gonzalez, who was seen by synchronous (real-time) {virtual platform audio-video vs audio only:00727::\"audio-video\"} technology, and/or her healthcare decision maker, is aware that this patient-initiated, Telehealth encounter on 5/12/2020 is a billable service, with coverage as determined by her insurance carrier. She is aware that she may receive a bill and has provided verbal consent to proceed: {YES/NO/NA-Consent obtained within past 12 months:45695}. - Timing/onset: - Duration:  
- Location:  
- Quality:  
- Severity:  
- Context:  
- Progression/Course: - Associated Symptoms/signs: - Tried: - Alleviating factors:  
- Aggravating factors: - Triggers:  
- Exposures: sick contacts, smoke, fumes - Notable pertinent PMH of *** Health Maintenance Due Topic Date Due  Foot Exam Q1  11/14/1977  MICROALBUMIN Q1  11/14/1977  Eye Exam Retinal or Dilated  11/14/1977  Breast Cancer Screen Mammogram  11/14/2017  
 FOBT Q1Y Age 50-75  11/14/2017  PAP AKA CERVICAL CYTOLOGY  06/23/2018 ROS: Positive items marked in RED 
CON: fever, chills Cardiovascular: palpitations, CP Resp: SOB, cough GI: nausea, vomiting, diarrhea : dysuria, hematuria Past Medical History:  
Diagnosis Date  Allergic rhinitis  Anxiety and depression  Asthma 11/3/2010  Chronic pain in right shoulder  Depression 10/19/2015  Diabetes ([address]  Eczema 10/19/2015  Head pain 4/28/2014  
 HTN (hypertension)  Multiple environmental allergies  Nicotine dependence  Syphilis in female 3/2/2015 Past Surgical History:  
Procedure Laterality Date  ENDOSCOPY, COLON, DIAGNOSTIC    
 HX TUBAL LIGATION Bilateral   
 
 
Family History Problem Relation Age of Onset  Breast Cancer Maternal Grandmother  Cancer Mother Social History Socioeconomic History  Marital status:   
 Spouse name: Not on file  Number of children: Not on file  Years of education: Not on file  Highest education level: Not on file Tobacco Use  Smoking status: Current Every Day Smoker Packs/day: 0.50 Types: Cigarettes  Smokeless tobacco: Never Used  Tobacco comment: quit in january, smokes one occ Substance and Sexual Activity  Alcohol use: Yes Alcohol/week: 2.0 standard drinks Types: 2 Shots of liquor per week  Drug use: No  
 Sexual activity: Yes  
  Partners: Male Allergies Allergen Reactions  Aspirin Nausea and Vomiting  Lisinopril Cough  Prednisone Swelling Current Outpatient Medications:  
  traZODone (DESYREL) 100 mg tablet, Take 1 Tab by mouth nightly., Disp: 90 Tab, Rfl: 1   irbesartan (AVAPRO) 75 mg tablet, Take 1 Tab by mouth nightly., Disp: 90 Tab, Rfl: 1 
  diclofenac EC (VOLTAREN) 75 mg EC tablet, Take 1 Tab by mouth daily. , Disp: 30 Tab, Rfl: 2 
  buPROPion SR (WELLBUTRIN SR) 150 mg SR tablet, Take 1 Tab by mouth two (2) times a day., Disp: 180 Tab, Rfl: 2 
  hydroCHLOROthiazide (HYDRODIURIL) 25 mg tablet, TAKE 1 TABLET BY MOUTH EVERY DAY, Disp: 90 Tab, Rfl: 1 
  metFORMIN ER (GLUCOPHAGE XR) 500 mg tablet, Take 1 Tab by mouth daily (with dinner). , Disp: 30 Tab, Rfl: 5 
  pravastatin (PRAVACHOL) 40 mg tablet, Take 1 Tab by mouth nightly., Disp: 30 Tab, Rfl: 5 
  ketoconazole (NIZORAL) 2 % topical cream, Apply once daily to cover the affected and immediate surrounding area for 2 weeks, Disp: 30 g, Rfl: 2 
  albuterol (PROVENTIL HFA, VENTOLIN HFA, PROAIR HFA) 90 mcg/actuation inhaler, Take 2 Puffs by inhalation every six (6) hours as needed for Wheezing., Disp: 3 Inhaler, Rfl: 2 Physical Exam:  
  
General: WD, WN, NAD, conversant Eyes: sclera clear bilaterally, no discharge noted, eyelids normal in appearance, EOMI HENT: NCAT Neck: no masses visualized, trachea appears to be midline Lungs: normal respiratory effort and rate, No visualized signs of difficulty breathing or respiratory distress MS: normal AROM of neck noted Skin: no significant exanthematous lesions or discoloration noted on neck/facial skin Psych: alert and oriented to person, place and situation, normal affect Neuro: speech normal, moving all upper extremities, no gaze palsy, no facial asymmetry (Cranial nerve 7 motor function) (limited exam due to video visit) Assessment/Plan Health Maintenance: - Cedric Asher is a 48 y.o. female being evaluated by a Virtual Visit (video visit) encounter to address concerns as mentioned above. A caregiver was present when appropriate. Due to this being a TeleHealth encounter ([facility]), evaluation of the following organ systems was limited: Vitals/Constitutional/EENT/Resp/CV/GI//MS/Neuro/Skin/Heme-Lymph-Imm. Pursuant to the emergency declaration under the [address] [place] [address] authority and the [employer] and [employer] Act, this Virtual Visit was conducted with patient's (and/or legal guardian's) consent, to reduce the risk of exposure to COVID-19 and provide necessary medical care. Services were provided through a video synchronous discussion virtually to substitute for in-person encounter. --Gibran Mary MD on 5/12/2020 at 3:34 PM 
 
An electronic signature was used to authenticate this note.

## 2020-05-12 NOTE — PROGRESS NOTES
[facility] *** HPI:  
 
Consent: Stella Gibson, who was seen by synchronous (real-time) {virtual platform audio-video vs audio only:66431::\"audio-video\"} technology, and/or her healthcare decision maker, is aware that this patient-initiated, Telehealth encounter on 5/12/2020 is a billable service, with coverage as determined by her insurance carrier. She is aware that she may receive a bill and has provided verbal consent to proceed: {YES/NO/NA-Consent obtained within past 12 months:03939}. - Timing/onset: - Duration:  
- Location:  
- Quality:  
- Severity:  
- Context:  
- Progression/Course: - Associated Symptoms/signs: - Tried: - Alleviating factors:  
- Aggravating factors: - Triggers:  
- Exposures: sick contacts, smoke, fumes - Notable pertinent PMH of *** Health Maintenance Due Topic Date Due  Foot Exam Q1  11/14/1977  MICROALBUMIN Q1  11/14/1977  Eye Exam Retinal or Dilated  11/14/1977  Breast Cancer Screen Mammogram  11/14/2017  
 FOBT Q1Y Age 50-75  11/14/2017  PAP AKA CERVICAL CYTOLOGY  06/23/2018 ROS: Positive items marked in RED 
CON: fever, chills Cardiovascular: palpitations, CP Resp: SOB, cough GI: nausea, vomiting, diarrhea : dysuria, hematuria Past Medical History:  
Diagnosis Date  Allergic rhinitis  Anxiety and depression  Asthma 11/3/2010  Chronic pain in right shoulder  Depression 10/19/2015  Diabetes ([address]  Eczema 10/19/2015  Head pain 4/28/2014  
 HTN (hypertension)  Multiple environmental allergies  Nicotine dependence  Syphilis in female 3/2/2015 Past Surgical History:  
Procedure Laterality Date  ENDOSCOPY, COLON, DIAGNOSTIC    
 HX TUBAL LIGATION Bilateral   
 
 
Family History Problem Relation Age of Onset  Breast Cancer Maternal Grandmother  Cancer Mother Social History Socioeconomic History  Marital status:   
 Spouse name: Not on file  Number of children: Not on file  Years of education: Not on file  Highest education level: Not on file Tobacco Use  Smoking status: Current Every Day Smoker Packs/day: 0.50 Types: Cigarettes  Smokeless tobacco: Never Used  Tobacco comment: quit in january, smokes one occ Substance and Sexual Activity  Alcohol use: Yes Alcohol/week: 2.0 standard drinks Types: 2 Shots of liquor per week  Drug use: No  
 Sexual activity: Yes  
  Partners: Male Allergies Allergen Reactions  Aspirin Nausea and Vomiting  Lisinopril Cough  Prednisone Swelling Current Outpatient Medications:  
  traZODone (DESYREL) 100 mg tablet, Take 1 Tab by mouth nightly., Disp: 90 Tab, Rfl: 1   irbesartan (AVAPRO) 75 mg tablet, Take 1 Tab by mouth nightly., Disp: 90 Tab, Rfl: 1 
  diclofenac EC (VOLTAREN) 75 mg EC tablet, Take 1 Tab by mouth daily. , Disp: 30 Tab, Rfl: 2 
  buPROPion SR (WELLBUTRIN SR) 150 mg SR tablet, Take 1 Tab by mouth two (2) times a day., Disp: 180 Tab, Rfl: 2 
  hydroCHLOROthiazide (HYDRODIURIL) 25 mg tablet, TAKE 1 TABLET BY MOUTH EVERY DAY, Disp: 90 Tab, Rfl: 1 
  metFORMIN ER (GLUCOPHAGE XR) 500 mg tablet, Take 1 Tab by mouth daily (with dinner). , Disp: 30 Tab, Rfl: 5 
  pravastatin (PRAVACHOL) 40 mg tablet, Take 1 Tab by mouth nightly., Disp: 30 Tab, Rfl: 5 
  albuterol (PROVENTIL HFA, VENTOLIN HFA, PROAIR HFA) 90 mcg/actuation inhaler, Take 2 Puffs by inhalation every six (6) hours as needed for Wheezing., Disp: 3 Inhaler, Rfl: 2 
  ketoconazole (NIZORAL) 2 % topical cream, Apply once daily to cover the affected and immediate surrounding area for 2 weeks, Disp: 30 g, Rfl: 2 Physical Exam:  
  
General: WD, WN, NAD, conversant Eyes: sclera clear bilaterally, no discharge noted, eyelids normal in appearance, EOMI HENT: NCAT Neck: no masses visualized, trachea appears to be midline Lungs: normal respiratory effort and rate, No visualized signs of difficulty breathing or respiratory distress MS: normal AROM of neck noted Skin: no significant exanthematous lesions or discoloration noted on neck/facial skin Psych: alert and oriented to person, place and situation, normal affect Neuro: speech normal, moving all upper extremities, no gaze palsy, no facial asymmetry (Cranial nerve 7 motor function) (limited exam due to video visit) Assessment/Plan Health Maintenance: - Hattie Donovan is a 48 y.o. female being evaluated by a Virtual Visit (video visit) encounter to address concerns as mentioned above. A caregiver was present when appropriate. Due to this being a TeleHealth encounter (During [facility] public health emergency), evaluation of the following organ systems was limited: Vitals/Constitutional/EENT/Resp/CV/GI//MS/Neuro/Skin/Heme-Lymph-Imm. Pursuant to the emergency declaration under the [address] [place] [address] authority and the [employer] and [employer] Act, this Virtual Visit was conducted with patient's (and/or legal guardian's) consent, to reduce the risk of exposure to COVID-19 and provide necessary medical care. Services were provided through a video synchronous discussion virtually to substitute for in-person encounter. --Paige Guillen MD on 5/12/2020 at 9:02 AM 
 
An electronic signature was used to authenticate this note.

## 2020-06-10 DIAGNOSIS — Z01.818 PREOP EXAMINATION: Primary | ICD-10-CM

## 2020-06-15 ENCOUNTER — HOSPITAL ENCOUNTER (OUTPATIENT)
Dept: LAB | Age: 55
Discharge: HOME | End: 2020-06-15

## 2020-06-15 LAB
A-G RATIO,AGRAT: 1.7 RATIO (ref 1.1–2.6)
ALBUMIN SERPL-MCNC: 4.2 G/DL (ref 3.5–5)
ALP SERPL-CCNC: 89 U/L (ref 25–115)
ALT SERPL-CCNC: 15 U/L (ref 5–40)
ANION GAP SERPL CALC-SCNC: 12 MMOL/L
AST SERPL W P-5'-P-CCNC: 12 U/L (ref 10–37)
BILIRUB SERPL-MCNC: 0.2 MG/DL (ref 0.2–1.2)
BUN SERPL-MCNC: 14 MG/DL (ref 6–22)
CALCIUM SERPL-MCNC: 9.5 MG/DL (ref 8.4–10.5)
CHLORIDE SERPL-SCNC: 102 MMOL/L (ref 98–110)
CHOLEST SERPL-MCNC: 124 MG/DL (ref 110–200)
CO2 SERPL-SCNC: 26 MMOL/L (ref 20–32)
CREAT SERPL-MCNC: 1 MG/DL (ref 0.5–1.2)
GFRAA, 66117: >60
GFRNA, 66118: 55.6
GLOBULIN,GLOB: 2.5 G/DL (ref 2–4)
GLUCOSE SERPL-MCNC: 107 MG/DL (ref 70–99)
HDLC SERPL-MCNC: 3.5 MG/DL (ref 0–5)
HDLC SERPL-MCNC: 35 MG/DL
LDL/HDL RATIO,LDHD: 2
LDLC SERPL CALC-MCNC: 72 MG/DL (ref 50–99)
NON-HDL CHOLESTEROL, 011976: 89 MG/DL
POTASSIUM SERPL-SCNC: 4.3 MMOL/L (ref 3.5–5.5)
PROT SERPL-MCNC: 6.7 G/DL (ref 6.4–8.3)
SENTARA SPECIMEN COL,SENBCF: NORMAL
SODIUM SERPL-SCNC: 140 MMOL/L (ref 133–145)
TRIGL SERPL-MCNC: 82 MG/DL (ref 40–149)
VLDLC SERPL CALC-MCNC: 16 MG/DL (ref 8–30)

## 2020-06-15 PROCEDURE — 99001 SPECIMEN HANDLING PT-LAB: CPT

## 2020-06-16 LAB
AVG GLU, 10930: 130 MG/DL (ref 91–123)
HBA1C MFR BLD HPLC: 6.2 % (ref 4.8–5.6)

## 2020-06-18 DIAGNOSIS — Z01.818 PREOP EXAMINATION: ICD-10-CM

## 2020-06-30 DIAGNOSIS — E78.5 HYPERLIPIDEMIA, UNSPECIFIED HYPERLIPIDEMIA TYPE: ICD-10-CM

## 2020-06-30 RX ORDER — PRAVASTATIN SODIUM 40 MG/1
TABLET ORAL
Qty: 90 TAB | Refills: 0 | Status: SHIPPED | OUTPATIENT
Start: 2020-06-30 | End: 2020-10-02

## 2020-07-02 DIAGNOSIS — M75.21 BICEPS TENDINITIS OF RIGHT SHOULDER: ICD-10-CM

## 2020-07-02 DIAGNOSIS — S46.011A TRAUMATIC COMPLETE TEAR OF RIGHT ROTATOR CUFF, INITIAL ENCOUNTER: Primary | ICD-10-CM

## 2020-07-02 DIAGNOSIS — Z01.812 PRE-OPERATIVE LABORATORY EXAMINATION: ICD-10-CM

## 2020-07-08 ENCOUNTER — OFFICE VISIT (OUTPATIENT)
Dept: ORTHOPEDIC SURGERY | Facility: CLINIC | Age: 55
End: 2020-07-08

## 2020-07-08 VITALS
HEIGHT: 64 IN | SYSTOLIC BLOOD PRESSURE: 115 MMHG | WEIGHT: 234.6 LBS | HEART RATE: 89 BPM | TEMPERATURE: 97.3 F | DIASTOLIC BLOOD PRESSURE: 73 MMHG | RESPIRATION RATE: 15 BRPM | BODY MASS INDEX: 40.05 KG/M2

## 2020-07-08 DIAGNOSIS — S46.011A TRAUMATIC COMPLETE TEAR OF RIGHT ROTATOR CUFF, INITIAL ENCOUNTER: Primary | ICD-10-CM

## 2020-07-08 DIAGNOSIS — Z01.818 PRE-OP EXAM: ICD-10-CM

## 2020-07-08 RX ORDER — MINERAL OIL
ENEMA (ML) RECTAL
COMMUNITY
End: 2020-07-10

## 2020-07-08 RX ORDER — LOSARTAN POTASSIUM 25 MG/1
25 TABLET ORAL DAILY
COMMUNITY
End: 2020-09-24

## 2020-07-08 NOTE — H&P (VIEW-ONLY)
HISTORY AND PHYSICAL Patient: Stasia Goldmann                MRN: [id number]       SSN: [id number] YOB: 1965          AGE: 48 y.o. SEX: female Patient scheduled for:  RIGHT SHOULDER ARTHROSCOPIC ROTATOR CUFF REPAIR, POSSIBLE BICEPS TENODESIS/TEOTOMY Surgeon: Amadeo Dixon MD 
 
ANESTHESIA TYPE:  General 
 
HISTORY:  
 
The patient was seen in the office today for a preoperative history and physical for an upcoming above listed surgery. The patient is a pleasant 48 y.o. female who has a history of right shoulder.  She has continued to have right shoulder pain.  She had her MRI done.  Unfortunately, since her last visit, she has had a few falls where her shoulder pain has worsened.  She has pain with reaching overhead.  She has difficulty with use of her arm due to pain.  She has pain at night.   
 
 
Due to the current findings, affected activity of daily living and continued pain and discomfort, surgical intervention is indicated. The alternatives, risks, and complications, including but not limited to infection, blood loss, need for blood transfusion, neurovascular damage, dimitry-incisional numbness, subcutaneous hematoma, bone fracture, anesthetic complications, DVT, PE, death, RSD, postoperative stiffness and pain, possible surgical scar, delayed healing and nonhealing, reflexive sympathetic dystrophy, damage to blood vessels and nerves, need for more surgery, MI, and stroke,  failure of hardware, gait disturbances,have been discussed. The patient understands and wishes to proceed with surgery. PAST MEDICAL HISTORY:  
 
Past Medical History:  
Diagnosis Date  Allergic rhinitis  Anxiety and depression  Asthma 11/3/2010  Chronic pain in right shoulder  Depression 10/19/2015  Diabetes [address] [address]  Eczema 10/19/2015  Head pain 4/28/2014  
 HTN (hypertension)  Multiple environmental allergies  Nicotine dependence  Syphilis in female 3/2/2015 CURRENT MEDICATIONS:  
 
Current Outpatient Medications Medication Sig Dispense Refill  fexofenadine (ALLEGRA) 180 mg tablet Take  by mouth.  losartan (COZAAR) 25 mg tablet Take  by mouth daily.  traZODone (DESYREL) 100 mg tablet Take 1 Tab by mouth nightly. 90 Tab 1  
 diclofenac EC (VOLTAREN) 75 mg EC tablet Take 1 Tab by mouth daily. 30 Tab 2  
 buPROPion SR (WELLBUTRIN SR) 150 mg SR tablet Take 1 Tab by mouth two (2) times a day. 180 Tab 2  
 hydroCHLOROthiazide (HYDRODIURIL) 25 mg tablet TAKE 1 TABLET BY MOUTH EVERY DAY 90 Tab 1  
 metFORMIN ER (GLUCOPHAGE XR) 500 mg tablet Take 1 Tab by mouth daily (with dinner). 30 Tab 5  
 albuterol (PROVENTIL HFA, VENTOLIN HFA, PROAIR HFA) 90 mcg/actuation inhaler Take 2 Puffs by inhalation every six (6) hours as needed for Wheezing. 3 Inhaler 2  
 pravastatin (PRAVACHOL) 40 mg tablet TAKE 1 TABLET BY MOUTH EVERY DAY AT NIGHT 90 Tab 0  
 irbesartan (AVAPRO) 75 mg tablet Take 1 Tab by mouth nightly. 90 Tab 1  ketoconazole (NIZORAL) 2 % topical cream Apply once daily to cover the affected and immediate surrounding area for 2 weeks 30 g 2 ALLERGIES:  
 
Allergies Allergen Reactions  Aspirin Nausea and Vomiting  Lisinopril Cough  Prednisone Swelling SURGICAL HISTORY:  
 
Past Surgical History:  
Procedure Laterality Date  ENDOSCOPY, COLON, DIAGNOSTIC    
 HX TUBAL LIGATION Bilateral   
 
 
SOCIAL HISTORY:  
 
Social History Socioeconomic History  Marital status:  Spouse name: Not on file  Number of children: Not on file  Years of education: Not on file  Highest education level: Not on file Tobacco Use  Smoking status: Current Every Day Smoker Packs/day: 0.50 Types: Cigarettes  Smokeless tobacco: Never Used  Tobacco comment: quit in january, smokes one occ Substance and Sexual Activity  Alcohol use:  Yes  
 Alcohol/week: 2.0 standard drinks Types: 2 Shots of liquor per week  Drug use: No  
 Sexual activity: Yes  
  Partners: Male FAMILY HISTORY:  
 
Family History Problem Relation Age of Onset  Breast Cancer Maternal Grandmother  Cancer Mother REVIEW OF SYSTEMS:  
 
Negative for fevers, chills, chest pain, shortness of breath, weight loss, recent illness General: Negative for fever and chills. No unexpected change in weight. Denies fatigue. No change in appetite. Skin: Negative for rash or itching. HEENT: Negative for congestion, sore throat, neck pain and neck stiffness. No change in vision or hearing. Hasn't noted any enlarged lymph nodes in the neck. Cardiovascular:  Negative for chest pain and palpitations. Has not noted pedal edema. Respiratory: Negative for cough, colds, sinus, hemoptysis, shortness of breath and wheezing. Gastrointestinal: Negative for nausea and vomiting, rectal bleeding, coffee ground emesis, abdominal pain, diarrhea and constipation. Genitourinary: Negative for dysuria, frequency urgency, or burning on micturition. No flank pain, no foul smelling urine, no difficulty with initiating urination. Hematological: Negative for bleeding or easy bruising. Musculoskeletal: Negative  for arthralgias, back pain or neck pain. Neurological: Negative for dizziness, seizures or syncopal episodes. Denies headaches. Endocrine: Denies excessive thirst.  No heat/cold intolerance. Psychiatric: Negative for depression or insomnia. PHYSICAL EXAMINATION:  
 
VITALS:  
Visit Vitals /73 Pulse 89 Temp 97.3 °F (36.3 °C) Resp 15 Ht 5' 4\" (1.626 m) Wt 234 lb 9.6 oz (106.4 kg) BMI 40.27 kg/m² GEN:  Well developed, well nourished 48 y.o. female in no acute distress. HEENT: Normocephalic and atraumatic. Eyes: Conjunctivae and EOM are normal.Pupils are equal, round, and reactive to light.  External ear normal appearance, external nose normal appearing. Mouth/Throat: Oropharynx is clear and moist, able to handle oral secretions w/out difficulty, airway patent NECK: Supple. Normal ROM, No lymphadenopathy. Trachea is midline. No bruising, swelling or deformity RESP: Clear to auscultation bilaterally. No wheezes, rales, rhonchi. Normal effort and breath sounds. No respiratory distress CARDIO: Normal rate, regular rhythm and normal heart sounds. No MGR. ABDOMEN: Soft, non-tender, non-distended, normoactive bowel sounds in all four quadrants. There is no tenderness. There is no rebound and no guarding. BACK: No CVA or spinal tenderness BREAST:  Deferred PELVIC:    Deferred RECTAL:  Deferred :           Deferred EXTREMITIES: EXAMINATION OF: right shoulder Physical exam of the right shoulder with full passive range of motion.  She has pain with forward flexion, as well as pain bringing her arm from a position of forward flexion down.  She has pain with biceps stress testing.  She has pain and weakness with supraspinatus rotator cuff stress testing.   
NEUROVASCULAR: Sensation intact to light touch and strength grossly intact and symmetrical. No nystagmus. Positive distal pulses and capillary refill. DVT ASSESSMENT:  There is not  calf tenderness. No evidence of DVT seen on physical exam. 
MOTOR: In tact PSYCH: Alert an oriented to person, place and time. Mood, memory, affect, behavior and judgment normal 
  
 
RADIOGRAPHS & DIAGNOSTIC STUDIES:  
 
MRI/xray reveals : An MRI of the right shoulder was reviewed, which shows a supraspinatus rotator cuff tear anterior aspect, which appears to be full thickness on several of the cuts.   
 
 
LABS:  
 
 
Labs pending EKG:  
  
Normal sinus rhythm Non-specific ST/T wave changes Borderline ECG When compared with ECG of 22-JUL-2016 20:51, No significant change was found ASSESSMENT:  
 
 
Encounter Diagnoses Name Primary?  Traumatic complete tear of right rotator cuff, initial encounter Yes  Pre-op exam   
 
 
PLAN:  
 
Again, the alternatives, risks, and complications, as well as expected outcome were discussed. The patient understands and agrees to proceed with RIGHT SHOULDER ARTHROSCOPIC ROTATOR CUFF REPAIR, POSSIBLE BICEPS TENODESIS/TEOTOMY. Patient given orders listed below: 
 
Orders Placed This Encounter  fexofenadine (ALLEGRA) 180 mg tablet  losartan (COZAAR) 25 mg tablet Kate Hinojosa PA-C 
7/8/2020 
3:36 PM

## 2020-07-08 NOTE — H&P
HISTORY AND PHYSICAL          Patient: Ana Nayak                MRN: [id number]       SSN: [id number]  YOB: 1965          AGE: 48 y.o. SEX: female      Patient scheduled for:  RIGHT SHOULDER ARTHROSCOPIC ROTATOR CUFF REPAIR, POSSIBLE BICEPS TENODESIS/TEOTOMY    Surgeon: Pete Eastman MD    ANESTHESIA TYPE:  General    HISTORY:     The patient was seen in the office today for a preoperative history and physical for an upcoming above listed surgery. The patient is a pleasant 48 y.o. female who has a history of right shoulder.  She has continued to have right shoulder pain.  She had her MRI done.  Unfortunately, since her last visit, she has had a few falls where her shoulder pain has worsened.  She has pain with reaching overhead.  She has difficulty with use of her arm due to pain.  She has pain at night.        Due to the current findings, affected activity of daily living and continued pain and discomfort, surgical intervention is indicated. The alternatives, risks, and complications, including but not limited to infection, blood loss, need for blood transfusion, neurovascular damage, dimitry-incisional numbness, subcutaneous hematoma, bone fracture, anesthetic complications, DVT, PE, death, RSD, postoperative stiffness and pain, possible surgical scar, delayed healing and nonhealing, reflexive sympathetic dystrophy, damage to blood vessels and nerves, need for more surgery, MI, and stroke,  failure of hardware, gait disturbances,have been discussed. The patient understands and wishes to proceed with surgery.      PAST MEDICAL HISTORY:     Past Medical History:   Diagnosis Date    Allergic rhinitis     Anxiety and depression     Asthma 11/3/2010    Chronic pain in right shoulder     Depression 10/19/2015    Diabetes ([address]     Eczema 10/19/2015    Head pain 4/28/2014    HTN (hypertension)     Multiple environmental allergies     Nicotine dependence     Syphilis in female 3/2/2015       CURRENT MEDICATIONS:     Current Outpatient Medications   Medication Sig Dispense Refill    fexofenadine (ALLEGRA) 180 mg tablet Take  by mouth.  losartan (COZAAR) 25 mg tablet Take  by mouth daily.  traZODone (DESYREL) 100 mg tablet Take 1 Tab by mouth nightly. 90 Tab 1    diclofenac EC (VOLTAREN) 75 mg EC tablet Take 1 Tab by mouth daily. 30 Tab 2    buPROPion SR (WELLBUTRIN SR) 150 mg SR tablet Take 1 Tab by mouth two (2) times a day. 180 Tab 2    hydroCHLOROthiazide (HYDRODIURIL) 25 mg tablet TAKE 1 TABLET BY MOUTH EVERY DAY 90 Tab 1    metFORMIN ER (GLUCOPHAGE XR) 500 mg tablet Take 1 Tab by mouth daily (with dinner). 30 Tab 5    albuterol (PROVENTIL HFA, VENTOLIN HFA, PROAIR HFA) 90 mcg/actuation inhaler Take 2 Puffs by inhalation every six (6) hours as needed for Wheezing. 3 Inhaler 2    pravastatin (PRAVACHOL) 40 mg tablet TAKE 1 TABLET BY MOUTH EVERY DAY AT NIGHT 90 Tab 0    irbesartan (AVAPRO) 75 mg tablet Take 1 Tab by mouth nightly. 90 Tab 1    ketoconazole (NIZORAL) 2 % topical cream Apply once daily to cover the affected and immediate surrounding area for 2 weeks 30 g 2       ALLERGIES:     Allergies   Allergen Reactions    Aspirin Nausea and Vomiting    Lisinopril Cough    Prednisone Swelling         SURGICAL HISTORY:     Past Surgical History:   Procedure Laterality Date    ENDOSCOPY, COLON, DIAGNOSTIC      HX TUBAL LIGATION Bilateral        SOCIAL HISTORY:     Social History     Socioeconomic History    Marital status:      Spouse name: Not on file    Number of children: Not on file    Years of education: Not on file    Highest education level: Not on file   Tobacco Use    Smoking status: Current Every Day Smoker     Packs/day: 0.50     Types: Cigarettes    Smokeless tobacco: Never Used    Tobacco comment: quit in january, smokes one occ   Substance and Sexual Activity    Alcohol use:  Yes     Alcohol/week: 2.0 standard drinks     Types: 2 Shots of liquor per week    Drug use: No    Sexual activity: Yes     Partners: Male       FAMILY HISTORY:     Family History   Problem Relation Age of Onset    Breast Cancer Maternal Grandmother     Cancer Mother        REVIEW OF SYSTEMS:     Negative for fevers, chills, chest pain, shortness of breath, weight loss, recent illness     General: Negative for fever and chills. No unexpected change in weight. Denies fatigue. No change in appetite. Skin: Negative for rash or itching. HEENT: Negative for congestion, sore throat, neck pain and neck stiffness. No change in vision or hearing. Hasn't noted any enlarged lymph nodes in the neck. Cardiovascular:  Negative for chest pain and palpitations. Has not noted pedal edema. Respiratory: Negative for cough, colds, sinus, hemoptysis, shortness of breath and wheezing. Gastrointestinal: Negative for nausea and vomiting, rectal bleeding, coffee ground emesis, abdominal pain, diarrhea and constipation. Genitourinary: Negative for dysuria, frequency urgency, or burning on micturition. No flank pain, no foul smelling urine, no difficulty with initiating urination. Hematological: Negative for bleeding or easy bruising. Musculoskeletal: Negative  for arthralgias, back pain or neck pain. Neurological: Negative for dizziness, seizures or syncopal episodes. Denies headaches. Endocrine: Denies excessive thirst.  No heat/cold intolerance. Psychiatric: Negative for depression or insomnia. PHYSICAL EXAMINATION:     VITALS:   Visit Vitals  /73   Pulse 89   Temp 97.3 °F (36.3 °C)   Resp 15   Ht 5' 4\" (1.626 m)   Wt 234 lb 9.6 oz (106.4 kg)   BMI 40.27 kg/m²     GEN:  Well developed, well nourished 48 y.o. female in no acute distress. HEENT: Normocephalic and atraumatic. Eyes: Conjunctivae and EOM are normal.Pupils are equal, round, and reactive to light. External ear normal appearance, external nose normal appearing.  Mouth/Throat: Oropharynx is clear and moist, able to handle oral secretions w/out difficulty, airway patent  NECK: Supple. Normal ROM, No lymphadenopathy. Trachea is midline. No bruising, swelling or deformity  RESP: Clear to auscultation bilaterally. No wheezes, rales, rhonchi. Normal effort and breath sounds. No respiratory distress  CARDIO: Normal rate, regular rhythm and normal heart sounds. No MGR. ABDOMEN: Soft, non-tender, non-distended, normoactive bowel sounds in all four quadrants. There is no tenderness. There is no rebound and no guarding. BACK: No CVA or spinal tenderness  BREAST:  Deferred  PELVIC:    Deferred   RECTAL:  Deferred   :           Deferred  EXTREMITIES: EXAMINATION OF: right shoulder   Physical exam of the right shoulder with full passive range of motion.  She has pain with forward flexion, as well as pain bringing her arm from a position of forward flexion down.  She has pain with biceps stress testing.  She has pain and weakness with supraspinatus rotator cuff stress testing.    NEUROVASCULAR: Sensation intact to light touch and strength grossly intact and symmetrical. No nystagmus. Positive distal pulses and capillary refill. DVT ASSESSMENT:  There is not  calf tenderness. No evidence of DVT seen on physical exam.  MOTOR: In tact  PSYCH: Alert an oriented to person, place and time. Mood, memory, affect, behavior and judgment normal       RADIOGRAPHS & DIAGNOSTIC STUDIES:     MRI/xray reveals : An MRI of the right shoulder was reviewed, which shows a supraspinatus rotator cuff tear anterior aspect, which appears to be full thickness on several of the cuts.        LABS:       Labs pending      EKG:      Normal sinus rhythm   Non-specific ST/T wave changes   Borderline ECG   When compared with ECG of 22-JUL-2016 20:51,   No significant change was found        ASSESSMENT:       Encounter Diagnoses   Name Primary?     Traumatic complete tear of right rotator cuff, initial encounter Yes    Pre-op exam        PLAN: Again, the alternatives, risks, and complications, as well as expected outcome were discussed. The patient understands and agrees to proceed with RIGHT SHOULDER ARTHROSCOPIC ROTATOR CUFF REPAIR, POSSIBLE BICEPS TENODESIS/TEOTOMY.  Patient given orders listed below:    Orders Placed This Encounter    fexofenadine (ALLEGRA) 180 mg tablet    losartan (COZAAR) 25 mg tablet         Danilo Workman PA-C  7/8/2020  3:36 PM

## 2020-07-08 NOTE — PROGRESS NOTES
History and Physical Performed today and documented in chart    Valerie Dickinson  7/8/2020.  3:36 PM

## 2020-07-09 ENCOUNTER — HOSPITAL ENCOUNTER (OUTPATIENT)
Dept: PREADMISSION TESTING | Age: 55
Discharge: HOME | End: 2020-07-09
Payer: MEDICAID

## 2020-07-09 DIAGNOSIS — S46.011A TRAUMATIC COMPLETE TEAR OF RIGHT ROTATOR CUFF, INITIAL ENCOUNTER: ICD-10-CM

## 2020-07-09 DIAGNOSIS — Z01.812 PRE-OPERATIVE LABORATORY EXAMINATION: ICD-10-CM

## 2020-07-09 DIAGNOSIS — M75.21 BICEPS TENDINITIS OF RIGHT SHOULDER: ICD-10-CM

## 2020-07-09 LAB
ALBUMIN SERPL-MCNC: 3.5 G/DL (ref 3.4–5)
ALBUMIN/GLOB SERPL: 1 {RATIO} (ref 0.8–1.7)
ALP SERPL-CCNC: 92 U/L (ref 45–117)
ALT SERPL-CCNC: 30 U/L (ref 13–56)
AMPHET UR QL SCN: NEGATIVE
ANION GAP SERPL CALC-SCNC: 6 MMOL/L (ref 3–18)
AST SERPL-CCNC: 20 U/L (ref 10–38)
BARBITURATES UR QL SCN: NEGATIVE
BASOPHILS # BLD: 0 K/UL (ref 0–0.1)
BASOPHILS NFR BLD: 0 % (ref 0–2)
BENZODIAZ UR QL: NEGATIVE
BILIRUB SERPL-MCNC: 0.5 MG/DL (ref 0.2–1)
BUN SERPL-MCNC: 14 MG/DL (ref 7–18)
BUN/CREAT SERPL: 14 (ref 12–20)
CALCIUM SERPL-MCNC: 9 MG/DL (ref 8.5–10.1)
CANNABINOIDS UR QL SCN: NEGATIVE
CHLORIDE SERPL-SCNC: 107 MMOL/L (ref 100–111)
CO2 SERPL-SCNC: 30 MMOL/L (ref 21–32)
COCAINE UR QL SCN: NEGATIVE
CREAT SERPL-MCNC: 0.99 MG/DL (ref 0.6–1.3)
DIFFERENTIAL METHOD BLD: NORMAL
EOSINOPHIL # BLD: 0.4 K/UL (ref 0–0.4)
EOSINOPHIL NFR BLD: 4 % (ref 0–5)
ERYTHROCYTE [DISTWIDTH] IN BLOOD BY AUTOMATED COUNT: 14.1 % (ref 11.6–14.5)
GLOBULIN SER CALC-MCNC: 3.5 G/DL (ref 2–4)
GLUCOSE SERPL-MCNC: 100 MG/DL (ref 74–99)
HCT VFR BLD AUTO: 38.9 % (ref 35–45)
HDSCOM,HDSCOM: NORMAL
HGB BLD-MCNC: 12.6 G/DL (ref 12–16)
LYMPHOCYTES # BLD: 3.1 K/UL (ref 0.9–3.6)
LYMPHOCYTES NFR BLD: 29 % (ref 21–52)
MCH RBC QN AUTO: 26.4 PG (ref 24–34)
MCHC RBC AUTO-ENTMCNC: 32.4 G/DL (ref 31–37)
MCV RBC AUTO: 81.6 FL (ref 74–97)
METHADONE UR QL: NEGATIVE
MONOCYTES # BLD: 0.8 K/UL (ref 0.05–1.2)
MONOCYTES NFR BLD: 7 % (ref 3–10)
NEUTS SEG # BLD: 6.4 K/UL (ref 1.8–8)
NEUTS SEG NFR BLD: 60 % (ref 40–73)
OPIATES UR QL: NEGATIVE
PCP UR QL: NEGATIVE
PLATELET # BLD AUTO: 343 K/UL (ref 135–420)
PMV BLD AUTO: 9.9 FL (ref 9.2–11.8)
POTASSIUM SERPL-SCNC: 4.5 MMOL/L (ref 3.5–5.5)
PROT SERPL-MCNC: 7 G/DL (ref 6.4–8.2)
RBC # BLD AUTO: 4.77 M/UL (ref 4.2–5.3)
SODIUM SERPL-SCNC: 143 MMOL/L (ref 136–145)
WBC # BLD AUTO: 10.7 K/UL (ref 4.6–13.2)

## 2020-07-09 PROCEDURE — 36415 COLL VENOUS BLD VENIPUNCTURE: CPT

## 2020-07-09 PROCEDURE — 80307 DRUG TEST PRSMV CHEM ANLYZR: CPT

## 2020-07-09 PROCEDURE — 80053 COMPREHEN METABOLIC PANEL: CPT

## 2020-07-09 PROCEDURE — 87635 SARS-COV-2 COVID-19 AMP PRB: CPT

## 2020-07-09 PROCEDURE — 85025 COMPLETE CBC W/AUTO DIFF WBC: CPT

## 2020-07-10 LAB — SARS-COV-2, COV2NT: NOT DETECTED

## 2020-07-10 RX ORDER — HYDROGEN PEROXIDE 3 %
20 SOLUTION, NON-ORAL MISCELLANEOUS AS NEEDED
COMMUNITY
End: 2020-11-20

## 2020-07-10 RX ORDER — FAMOTIDINE 20 MG/1
20 TABLET, FILM COATED ORAL 2 TIMES DAILY
COMMUNITY
End: 2020-07-13

## 2020-07-10 RX ORDER — FAMOTIDINE 20 MG/1
20 TABLET, FILM COATED ORAL 2 TIMES DAILY
COMMUNITY
End: 2020-07-10

## 2020-07-12 ENCOUNTER — ANESTHESIA EVENT (OUTPATIENT)
Dept: SURGERY | Age: 55
End: 2020-07-12
Payer: MEDICAID

## 2020-07-13 ENCOUNTER — ANESTHESIA (OUTPATIENT)
Dept: SURGERY | Age: 55
End: 2020-07-13
Payer: MEDICAID

## 2020-07-13 ENCOUNTER — HOSPITAL ENCOUNTER (OUTPATIENT)
Age: 55
Setting detail: OUTPATIENT SURGERY
Discharge: HOME | End: 2020-07-13
Attending: ORTHOPAEDIC SURGERY | Admitting: ORTHOPAEDIC SURGERY
Payer: MEDICAID

## 2020-07-13 VITALS
HEIGHT: 64 IN | DIASTOLIC BLOOD PRESSURE: 81 MMHG | HEART RATE: 88 BPM | SYSTOLIC BLOOD PRESSURE: 118 MMHG | WEIGHT: 233 LBS | OXYGEN SATURATION: 93 % | TEMPERATURE: 98.4 F | BODY MASS INDEX: 39.78 KG/M2 | RESPIRATION RATE: 16 BRPM

## 2020-07-13 DIAGNOSIS — M25.511 CHRONIC RIGHT SHOULDER PAIN: ICD-10-CM

## 2020-07-13 DIAGNOSIS — G89.29 CHRONIC RIGHT SHOULDER PAIN: ICD-10-CM

## 2020-07-13 DIAGNOSIS — G89.18 PAIN FOLLOWING SURGERY OR PROCEDURE: Primary | ICD-10-CM

## 2020-07-13 DIAGNOSIS — M75.21 BICEPS TENDINITIS OF RIGHT SHOULDER: ICD-10-CM

## 2020-07-13 DIAGNOSIS — S46.011A TRAUMATIC COMPLETE TEAR OF RIGHT ROTATOR CUFF, INITIAL ENCOUNTER: ICD-10-CM

## 2020-07-13 LAB
AMPHET UR QL SCN: NEGATIVE
BARBITURATES UR QL SCN: NEGATIVE
BENZODIAZ UR QL: NEGATIVE
CANNABINOIDS UR QL SCN: NEGATIVE
COCAINE UR QL SCN: NEGATIVE
GLUCOSE BLD STRIP.AUTO-MCNC: 110 MG/DL (ref 70–110)
GLUCOSE BLD STRIP.AUTO-MCNC: 124 MG/DL (ref 70–110)
HCG UR QL: NEGATIVE
HCG UR QL: NEGATIVE
HDSCOM,HDSCOM: NORMAL
METHADONE UR QL: NEGATIVE
OPIATES UR QL: NEGATIVE
PCP UR QL: NEGATIVE

## 2020-07-13 PROCEDURE — 74011250636 HC RX REV CODE- 250/636: Performed by: ORTHOPAEDIC SURGERY

## 2020-07-13 PROCEDURE — 74011250636 HC RX REV CODE- 250/636: Performed by: NURSE ANESTHETIST, CERTIFIED REGISTERED

## 2020-07-13 PROCEDURE — 76942 ECHO GUIDE FOR BIOPSY: CPT | Performed by: ANESTHESIOLOGY

## 2020-07-13 PROCEDURE — 76010000131 HC OR TIME 2 TO 2.5 HR: Performed by: ORTHOPAEDIC SURGERY

## 2020-07-13 PROCEDURE — 81025 URINE PREGNANCY TEST: CPT

## 2020-07-13 PROCEDURE — 74011250636 HC RX REV CODE- 250/636

## 2020-07-13 PROCEDURE — 74011000250 HC RX REV CODE- 250: Performed by: NURSE ANESTHETIST, CERTIFIED REGISTERED

## 2020-07-13 PROCEDURE — 76210000021 HC REC RM PH II 0.5 TO 1 HR: Performed by: ORTHOPAEDIC SURGERY

## 2020-07-13 PROCEDURE — 64415 NJX AA&/STRD BRCH PLXS IMG: CPT | Performed by: ANESTHESIOLOGY

## 2020-07-13 PROCEDURE — 77030020268 HC MISC GENERAL SUPPLY: Performed by: ORTHOPAEDIC SURGERY

## 2020-07-13 PROCEDURE — 80307 DRUG TEST PRSMV CHEM ANLYZR: CPT

## 2020-07-13 PROCEDURE — 77030003601 HC NDL NRV BLK BBMI -A: Performed by: ORTHOPAEDIC SURGERY

## 2020-07-13 PROCEDURE — 77030002916 HC SUT ETHLN J&J -A: Performed by: ORTHOPAEDIC SURGERY

## 2020-07-13 PROCEDURE — 77030026438 HC STYL ET INTUB CARD -A: Performed by: ANESTHESIOLOGY

## 2020-07-13 PROCEDURE — 77030017964 HC PRB COAG4 STRY -C: Performed by: ORTHOPAEDIC SURGERY

## 2020-07-13 PROCEDURE — 74011000272 HC RX REV CODE- 272: Performed by: ORTHOPAEDIC SURGERY

## 2020-07-13 PROCEDURE — 82962 GLUCOSE BLOOD TEST: CPT

## 2020-07-13 PROCEDURE — 77030040361 HC SLV COMPR DVT MDII -B: Performed by: ORTHOPAEDIC SURGERY

## 2020-07-13 PROCEDURE — 76210000006 HC OR PH I REC 0.5 TO 1 HR: Performed by: ORTHOPAEDIC SURGERY

## 2020-07-13 PROCEDURE — 74011250636 HC RX REV CODE- 250/636: Performed by: ANESTHESIOLOGY

## 2020-07-13 PROCEDURE — 77030033005 HC TBNG ARTHSC PMP STRY -B: Performed by: ORTHOPAEDIC SURGERY

## 2020-07-13 PROCEDURE — 77030040922 HC BLNKT HYPOTHRM STRY -A: Performed by: ORTHOPAEDIC SURGERY

## 2020-07-13 PROCEDURE — C1713 ANCHOR/SCREW BN/BN,TIS/BN: HCPCS | Performed by: ORTHOPAEDIC SURGERY

## 2020-07-13 PROCEDURE — 77030008683 HC TU ET CUF COVD -A: Performed by: ANESTHESIOLOGY

## 2020-07-13 PROCEDURE — 74011250637 HC RX REV CODE- 250/637: Performed by: NURSE ANESTHETIST, CERTIFIED REGISTERED

## 2020-07-13 PROCEDURE — 77030010427: Performed by: ORTHOPAEDIC SURGERY

## 2020-07-13 PROCEDURE — 76060000035 HC ANESTHESIA 2 TO 2.5 HR: Performed by: ORTHOPAEDIC SURGERY

## 2020-07-13 PROCEDURE — 77030018836 HC SOL IRR NACL ICUM -A: Performed by: ORTHOPAEDIC SURGERY

## 2020-07-13 PROCEDURE — 77030022036 HC BLD SHV TOMCAT STRY -B: Performed by: ORTHOPAEDIC SURGERY

## 2020-07-13 PROCEDURE — 77030004453 HC BUR SHV STRY -B: Performed by: ORTHOPAEDIC SURGERY

## 2020-07-13 DEVICE — ANCHOR SUTURE BIOCOMP 4.75X19.1 MM SWIVELOCK C: Type: IMPLANTABLE DEVICE | Site: SHOULDER | Status: FUNCTIONAL

## 2020-07-13 DEVICE — ANCHOR SUT L14.7MM DIA5.5MM 2 NO2 TIGERTAIL FULL THRD: Type: IMPLANTABLE DEVICE | Site: SHOULDER | Status: FUNCTIONAL

## 2020-07-13 RX ORDER — SODIUM CHLORIDE, SODIUM LACTATE, POTASSIUM CHLORIDE, CALCIUM CHLORIDE 600; 310; 30; 20 MG/100ML; MG/100ML; MG/100ML; MG/100ML
75 INJECTION, SOLUTION INTRAVENOUS CONTINUOUS
Status: DISCONTINUED | OUTPATIENT
Start: 2020-07-13 | End: 2020-07-13 | Stop reason: HOSPADM

## 2020-07-13 RX ORDER — SUCCINYLCHOLINE CHLORIDE 20 MG/ML
INJECTION INTRAMUSCULAR; INTRAVENOUS AS NEEDED
Status: DISCONTINUED | OUTPATIENT
Start: 2020-07-13 | End: 2020-07-13 | Stop reason: HOSPADM

## 2020-07-13 RX ORDER — FENTANYL CITRATE 50 UG/ML
INJECTION, SOLUTION INTRAMUSCULAR; INTRAVENOUS AS NEEDED
Status: DISCONTINUED | OUTPATIENT
Start: 2020-07-13 | End: 2020-07-13 | Stop reason: HOSPADM

## 2020-07-13 RX ORDER — ONDANSETRON 2 MG/ML
4 INJECTION INTRAMUSCULAR; INTRAVENOUS ONCE
Status: DISCONTINUED | OUTPATIENT
Start: 2020-07-13 | End: 2020-07-13 | Stop reason: HOSPADM

## 2020-07-13 RX ORDER — OXYCODONE AND ACETAMINOPHEN 10; 325 MG/1; MG/1
1 TABLET ORAL
Qty: 30 TAB | Refills: 0 | Status: SHIPPED | OUTPATIENT
Start: 2020-07-13 | End: 2020-07-20

## 2020-07-13 RX ORDER — MIDAZOLAM HYDROCHLORIDE 1 MG/ML
2 INJECTION, SOLUTION INTRAMUSCULAR; INTRAVENOUS
Status: DISCONTINUED | OUTPATIENT
Start: 2020-07-13 | End: 2020-07-13 | Stop reason: HOSPADM

## 2020-07-13 RX ORDER — MIDAZOLAM HYDROCHLORIDE 1 MG/ML
INJECTION, SOLUTION INTRAMUSCULAR; INTRAVENOUS
Status: COMPLETED | OUTPATIENT
Start: 2020-07-13 | End: 2020-07-13

## 2020-07-13 RX ORDER — SODIUM CHLORIDE 0.9 % (FLUSH) 0.9 %
5-40 SYRINGE (ML) INJECTION AS NEEDED
Status: DISCONTINUED | OUTPATIENT
Start: 2020-07-13 | End: 2020-07-13 | Stop reason: HOSPADM

## 2020-07-13 RX ORDER — ROPIVACAINE HYDROCHLORIDE 5 MG/ML
30 INJECTION, SOLUTION EPIDURAL; INFILTRATION; PERINEURAL
Status: COMPLETED | OUTPATIENT
Start: 2020-07-13 | End: 2020-07-13

## 2020-07-13 RX ORDER — LIDOCAINE HYDROCHLORIDE 20 MG/ML
INJECTION, SOLUTION EPIDURAL; INFILTRATION; INTRACAUDAL; PERINEURAL AS NEEDED
Status: DISCONTINUED | OUTPATIENT
Start: 2020-07-13 | End: 2020-07-13 | Stop reason: HOSPADM

## 2020-07-13 RX ORDER — SODIUM CHLORIDE, SODIUM LACTATE, POTASSIUM CHLORIDE, CALCIUM CHLORIDE 600; 310; 30; 20 MG/100ML; MG/100ML; MG/100ML; MG/100ML
50 INJECTION, SOLUTION INTRAVENOUS CONTINUOUS
Status: DISCONTINUED | OUTPATIENT
Start: 2020-07-13 | End: 2020-07-13 | Stop reason: HOSPADM

## 2020-07-13 RX ORDER — DEXAMETHASONE SODIUM PHOSPHATE 4 MG/ML
INJECTION, SOLUTION INTRA-ARTICULAR; INTRALESIONAL; INTRAMUSCULAR; INTRAVENOUS; SOFT TISSUE AS NEEDED
Status: DISCONTINUED | OUTPATIENT
Start: 2020-07-13 | End: 2020-07-13 | Stop reason: HOSPADM

## 2020-07-13 RX ORDER — GLYCOPYRROLATE 0.2 MG/ML
INJECTION INTRAMUSCULAR; INTRAVENOUS AS NEEDED
Status: DISCONTINUED | OUTPATIENT
Start: 2020-07-13 | End: 2020-07-13 | Stop reason: HOSPADM

## 2020-07-13 RX ORDER — ONDANSETRON 2 MG/ML
INJECTION INTRAMUSCULAR; INTRAVENOUS AS NEEDED
Status: DISCONTINUED | OUTPATIENT
Start: 2020-07-13 | End: 2020-07-13 | Stop reason: HOSPADM

## 2020-07-13 RX ORDER — CEFAZOLIN SODIUM 2 G/50ML
2 SOLUTION INTRAVENOUS ONCE
Status: COMPLETED | OUTPATIENT
Start: 2020-07-13 | End: 2020-07-13

## 2020-07-13 RX ORDER — INSULIN LISPRO 100 [IU]/ML
INJECTION, SOLUTION INTRAVENOUS; SUBCUTANEOUS ONCE
Status: DISCONTINUED | OUTPATIENT
Start: 2020-07-13 | End: 2020-07-13 | Stop reason: HOSPADM

## 2020-07-13 RX ORDER — FAMOTIDINE 20 MG/1
20 TABLET, FILM COATED ORAL ONCE
Status: DISCONTINUED | OUTPATIENT
Start: 2020-07-13 | End: 2020-07-13 | Stop reason: HOSPADM

## 2020-07-13 RX ORDER — ROCURONIUM BROMIDE 10 MG/ML
INJECTION, SOLUTION INTRAVENOUS AS NEEDED
Status: DISCONTINUED | OUTPATIENT
Start: 2020-07-13 | End: 2020-07-13 | Stop reason: HOSPADM

## 2020-07-13 RX ORDER — PROPOFOL 10 MG/ML
INJECTION, EMULSION INTRAVENOUS AS NEEDED
Status: DISCONTINUED | OUTPATIENT
Start: 2020-07-13 | End: 2020-07-13 | Stop reason: HOSPADM

## 2020-07-13 RX ORDER — NALOXONE HYDROCHLORIDE 0.4 MG/ML
0.2 INJECTION, SOLUTION INTRAMUSCULAR; INTRAVENOUS; SUBCUTANEOUS AS NEEDED
Status: DISCONTINUED | OUTPATIENT
Start: 2020-07-13 | End: 2020-07-13 | Stop reason: HOSPADM

## 2020-07-13 RX ORDER — DEXTROSE 50 % IN WATER (D50W) INTRAVENOUS SYRINGE
25-50 AS NEEDED
Status: DISCONTINUED | OUTPATIENT
Start: 2020-07-13 | End: 2020-07-13 | Stop reason: HOSPADM

## 2020-07-13 RX ORDER — LIDOCAINE HYDROCHLORIDE 10 MG/ML
0.1 INJECTION, SOLUTION EPIDURAL; INFILTRATION; INTRACAUDAL; PERINEURAL AS NEEDED
Status: DISCONTINUED | OUTPATIENT
Start: 2020-07-13 | End: 2020-07-13 | Stop reason: HOSPADM

## 2020-07-13 RX ORDER — FENTANYL CITRATE 50 UG/ML
100 INJECTION, SOLUTION INTRAMUSCULAR; INTRAVENOUS
Status: DISCONTINUED | OUTPATIENT
Start: 2020-07-13 | End: 2020-07-13 | Stop reason: HOSPADM

## 2020-07-13 RX ORDER — ROPIVACAINE HYDROCHLORIDE 5 MG/ML
INJECTION, SOLUTION EPIDURAL; INFILTRATION; PERINEURAL
Status: COMPLETED | OUTPATIENT
Start: 2020-07-13 | End: 2020-07-13

## 2020-07-13 RX ORDER — SODIUM CHLORIDE 0.9 % (FLUSH) 0.9 %
5-40 SYRINGE (ML) INJECTION EVERY 8 HOURS
Status: DISCONTINUED | OUTPATIENT
Start: 2020-07-13 | End: 2020-07-13 | Stop reason: HOSPADM

## 2020-07-13 RX ORDER — FENTANYL CITRATE 50 UG/ML
INJECTION, SOLUTION INTRAMUSCULAR; INTRAVENOUS
Status: COMPLETED | OUTPATIENT
Start: 2020-07-13 | End: 2020-07-13

## 2020-07-13 RX ORDER — MAGNESIUM SULFATE 100 %
4 CRYSTALS MISCELLANEOUS AS NEEDED
Status: DISCONTINUED | OUTPATIENT
Start: 2020-07-13 | End: 2020-07-13 | Stop reason: HOSPADM

## 2020-07-13 RX ORDER — HYDROMORPHONE HYDROCHLORIDE 2 MG/ML
0.5 INJECTION, SOLUTION INTRAMUSCULAR; INTRAVENOUS; SUBCUTANEOUS
Status: DISCONTINUED | OUTPATIENT
Start: 2020-07-13 | End: 2020-07-13 | Stop reason: HOSPADM

## 2020-07-13 RX ADMIN — LIDOCAINE HYDROCHLORIDE 0.1 ML: 10 INJECTION, SOLUTION EPIDURAL; INFILTRATION; INTRACAUDAL; PERINEURAL at 09:59

## 2020-07-13 RX ADMIN — GLYCOPYRROLATE 0.2 MG: 0.2 INJECTION INTRAMUSCULAR; INTRAVENOUS at 10:16

## 2020-07-13 RX ADMIN — DEXAMETHASONE SODIUM PHOSPHATE 4 MG: 4 INJECTION, SOLUTION INTRAMUSCULAR; INTRAVENOUS at 10:32

## 2020-07-13 RX ADMIN — FENTANYL CITRATE 100 MCG: 50 INJECTION, SOLUTION INTRAMUSCULAR; INTRAVENOUS at 09:59

## 2020-07-13 RX ADMIN — PROPOFOL 160 MG: 10 INJECTION, EMULSION INTRAVENOUS at 10:25

## 2020-07-13 RX ADMIN — FENTANYL CITRATE 50 MCG: 50 INJECTION, SOLUTION INTRAMUSCULAR; INTRAVENOUS at 11:27

## 2020-07-13 RX ADMIN — ROPIVACAINE HYDROCHLORIDE 150 MG: 150 INJECTION, SOLUTION EPIDURAL; INFILTRATION; PERINEURAL at 09:58

## 2020-07-13 RX ADMIN — FENTANYL CITRATE 100 MCG: 50 INJECTION, SOLUTION INTRAMUSCULAR; INTRAVENOUS at 09:56

## 2020-07-13 RX ADMIN — FENTANYL CITRATE 50 MCG: 50 INJECTION, SOLUTION INTRAMUSCULAR; INTRAVENOUS at 12:05

## 2020-07-13 RX ADMIN — ONDANSETRON 4 MG: 2 INJECTION INTRAMUSCULAR; INTRAVENOUS at 10:16

## 2020-07-13 RX ADMIN — ROCURONIUM BROMIDE 20 MG: 50 INJECTION INTRAVENOUS at 10:34

## 2020-07-13 RX ADMIN — MIDAZOLAM HYDROCHLORIDE 2 MG: 2 INJECTION, SOLUTION INTRAMUSCULAR; INTRAVENOUS at 09:56

## 2020-07-13 RX ADMIN — ROPIVACAINE HYDROCHLORIDE 30 ML: 5 INJECTION, SOLUTION EPIDURAL; INFILTRATION; PERINEURAL at 09:56

## 2020-07-13 RX ADMIN — CEFAZOLIN SODIUM 2 G: 2 SOLUTION INTRAVENOUS at 10:16

## 2020-07-13 RX ADMIN — SUCCINYLCHOLINE CHLORIDE 120 MG: 20 INJECTION, SOLUTION INTRAMUSCULAR; INTRAVENOUS at 10:25

## 2020-07-13 RX ADMIN — SODIUM CHLORIDE, SODIUM LACTATE, POTASSIUM CHLORIDE, AND CALCIUM CHLORIDE 50 ML/HR: 600; 310; 30; 20 INJECTION, SOLUTION INTRAVENOUS at 09:38

## 2020-07-13 RX ADMIN — LIDOCAINE HYDROCHLORIDE 80 MG: 20 INJECTION, SOLUTION EPIDURAL; INFILTRATION; INTRACAUDAL; PERINEURAL at 10:25

## 2020-07-13 RX ADMIN — MIDAZOLAM 2 MG: 1 INJECTION INTRAMUSCULAR; INTRAVENOUS at 09:59

## 2020-07-13 NOTE — DISCHARGE INSTRUCTIONS
DISCHARGE SUMMARY from Nurse    PATIENT INSTRUCTIONS:    After general anesthesia or intravenous sedation, for 24 hours or while taking prescription Narcotics:  · Limit your activities  · Do not drive and operate hazardous machinery  · Do not make important personal or business decisions  · Do  not drink alcoholic beverages  · If you have not urinated within 8 hours after discharge, please contact your surgeon on call. Report the following to your surgeon:  · Excessive pain, swelling, redness or odor of or around the surgical area  · Temperature over 100.5  · Nausea and vomiting lasting longer than 4 hours or if unable to take medications  · Any signs of decreased circulation or nerve impairment to extremity: change in color, persistent  numbness, tingling, coldness or increase pain  · Any questions    What to do at Home:  Recommended activity: Activity as tolerated and no driving for today. *  Please give a list of your current medications to your Primary Care Provider. *  Please update this list whenever your medications are discontinued, doses are      changed, or new medications (including over-the-counter products) are added. *  Please carry medication information at all times in case of emergency situations. These are general instructions for a healthy lifestyle:    No smoking/ No tobacco products/ Avoid exposure to second hand smoke  Surgeon General's Warning:  Quitting smoking now greatly reduces serious risk to your health. Obesity, smoking, and sedentary lifestyle greatly increases your risk for illness    A healthy diet, regular physical exercise & weight monitoring are important for maintaining a healthy lifestyle    You may be retaining fluid if you have a history of heart failure or if you experience any of the following symptoms:  Weight gain of 3 pounds or more overnight or 5 pounds in a week, increased swelling in our hands or feet or shortness of breath while lying flat in bed. Please call your doctor as soon as you notice any of these symptoms; do not wait until your next office visit. The discharge information has been reviewed with the patient. The patient verbalized understanding. Discharge medications reviewed with the patient and appropriate educational materials and side effects teaching were provided. ___________________________________________________________________________________________________________________________________    Patient Education        Rotator Cuff Repair: What to Expect at [facility] cuff repair surgery is done to fix a tear in the rotator cuff. It can also include cleaning the space between the rotator cuff tendons and the shoulder blade. This is called subacromial smoothing. You will feel tired for several days. Your shoulder will be swollen. And you may notice that your skin is a different color near the cut (incision). Your hand and arm may also be swollen. This is normal and will start to get better in a few days. It will be several months before you have complete use of your shoulder and arm. When you have healed from surgery, you will need to build your strength and the motion of your joint with rehabilitation (rehab) exercises. In time, your shoulder will likely be stronger, less painful, and more flexible than it was before the surgery. This care sheet gives you a general idea about how long it will take for you to recover. But each person recovers at a different pace. Follow the steps below to get better as quickly as possible. How can you care for yourself at home? Activity  · Rest when you feel tired. Getting enough sleep will help you recover. Do not lie flat or sleep on your side. Raise your upper body on two or three pillows, or sleep in a reclining chair. · Try to walk each day. Start by walking a little more than you did the day before. Bit by bit, increase the amount you walk.  Walking boosts blood flow and helps prevent pneumonia and constipation. · Your arm will be in a sling or other device to prevent it from moving for 4 to 8 weeks. ? Always use the sling when you walk or stand. ? If you sit or lie down, you can loosen the sling, but don't remove it. This lets your elbow straighten without moving the shoulder. You can also support your arm on a pillow. ? Remove the sling only to do prescribed exercises or to shower. · You will not have complete use of your affected arm for 3 to 4 months after surgery. ? You can use your affected arm for writing, eating, or drinking, but move it only at the elbow or wrist. Do not use it for anything else except prescribed exercises until the sling has been removed. ? When the sling has been removed, you can do activities that don't involve lifting, pushing, pulling, or carrying. You may not be able to do overhead lifting for 6 to 12 months. · If you have a desk job, you will probably be able to go back to work or your normal routine in 1 to 2 weeks. If you have a more active job, you may be away from work for 3 to 4 months or longer. If you work at a job that involves heavy manual labor, lifting your arms above your head, or the use of heavy tools, you may have to think about making changes to your job. · If you had arthroscopic surgery, you can take a shower 48 to 72 hours after surgery. Remove the sling, and leave your arm by your side. To wash under your armpit, lean over and let the arm fall away from your body. Do not raise your arm. You may want to use a shower stool for a day or two. · If you had open surgery, do not shower until you see your doctor and he or she okays it. You can wash the incisions with regular soap and water. · Ask your doctor when you can drive again. This may take about 6 weeks or until you are no longer wearing the sling. Diet  · You can eat your normal diet.  If your stomach is upset, try bland, low-fat foods like plain rice, broiled chicken, toast, and yogurt. Drink plenty of fluids. · You may notice that your bowel movements are not regular right after your surgery. This is common. Try to avoid constipation and straining with bowel movements. You may want to take a fiber supplement every day. If you have not had a bowel movement after a couple of days, ask your doctor about taking a mild laxative. Medicines  · Your doctor will tell you if and when you can restart your medicines. He or she will also give you instructions about taking any new medicines. · If you take aspirin or some other blood thinner, ask your doctor if and when to start taking it again. Make sure that you understand exactly what your doctor wants you to do. · Take pain medicines exactly as directed. ? If the doctor gave you a prescription medicine for pain, take it as prescribed. Use pain medicine when you first notice pain, before it becomes severe. It's easier to prevent pain early than to stop it after it gets bad.  ? If you are not taking a prescription pain medicine, ask your doctor if you can take an over-the-counter medicine. · If your doctor prescribed antibiotics, take them as directed. Do not stop taking them just because you feel better. You need to take the full course of antibiotics. · If you think your pain medicine is making you sick to your stomach:  ? Take your medicine after meals (unless your doctor has told you not to). ? Ask your doctor for a different pain medicine. Incision care  · If you had arthroscopic surgery, you may remove the bandage over your cut (incision) 24 to 48 hours after the surgery. Keep the bandage clean and dry. · If you had open surgery, do not remove your bandage until you see your doctor and he or she okays it. Keep the bandage clean and dry. · If your incision is open to the air, keep the area clean and dry.   · If you have strips of tape on the incision, leave the tape on for a week or until it falls off.  Exercise  · Shoulder rehabilitation is a series of exercises you do after your surgery. This helps you get back your shoulder's range of motion and strength. You will work with your doctor and physical therapist to plan this exercise program. Rehab may not start until 6 weeks after the surgery. To get the best results, you need to do the exercises correctly and as often and for as long as your doctor tells you. Ice  · To reduce swelling and pain, put ice or a cold pack on your shoulder for 10 to 20 minutes at a time. Do this every 1 to 2 hours. Put a thin cloth between the ice and your skin. Follow-up care is a key part of your treatment and safety. Be sure to make and go to all appointments, and call your doctor if you are having problems. It's also a good idea to know your test results and keep a list of the medicines you take. When should you call for help? [id number] anytime you think you may need emergency care. For example, call if:  · You passed out (lost consciousness). · You have severe trouble breathing. · You have sudden chest pain and shortness of breath, or you cough up blood. Call your doctor now or seek immediate medical care if:  · You have numbness, tingling, or a bluish color in your fingers or hand. · You have severe nausea or vomiting. · You have pain that does not go away after you take pain medicine. · You have loose stitches, or your incision comes open. · Your incision bleeds through your first bandage or is still bleeding 3 days after your surgery. · You have signs of infection, such as:  ? Increased pain, swelling, warmth, or redness. ? Red streaks leading from the incision. ? Pus draining from the incision. ? A fever. Watch closely for any changes in your health, and be sure to contact your doctor if:  · You do not have a bowel movement after taking a laxative. Where can you learn more?   Go to http://www.gray.com/  Enter C060 in the search box to learn more about \"Rotator Cuff Repair: What to Expect at Home. \"  Current as of: March 2, 2020               Content Version: 12.5  © 2006-2020 [employer]. Care instructions adapted under license by [employer] (which disclaims liability or warranty for this information). If you have questions about a medical condition or this instruction, always ask your healthcare professional. [place] [address] any warranty or liability for your use of this information.

## 2020-07-13 NOTE — ANESTHESIA PROCEDURE NOTES
Peripheral Block    Start time: 7/13/2020 9:50 AM  End time: 7/13/2020 10:00 AM  Performed by: Florence Dodson MD  Authorized by: Florence Dodson MD       Pre-procedure: Indications: at surgeon's request and post-op pain management    Preanesthetic Checklist: patient identified, risks and benefits discussed, site marked, timeout performed, anesthesia consent given and patient being monitored    Timeout Time: 09:50          Block Type:   Block Type:   Interscalene  Laterality:  Right  Monitoring:  Standard ASA monitoring, responsive to questions, continuous pulse ox, oxygen, frequent vital sign checks and heart rate  Injection Technique:  Single shot  Procedures: paresthesia technique and nerve stimulator    Patient Position: supine  Prep: chlorhexidine    Needle Type:  Stimuplex  Needle Gauge:  20 G  Needle Localization:  Nerve stimulator and ultrasound guidance  Motor Response: minimal motor response >0.4 mA      Assessment:  Number of attempts:  1  Injection Assessment:  Incremental injection every 5 mL, negative aspiration for CSF, no paresthesia, ultrasound image on chart, local visualized surrounding nerve on ultrasound, negative aspiration for blood and no intravascular symptoms  Patient tolerance:  Patient tolerated the procedure well with no immediate complications

## 2020-07-13 NOTE — BRIEF OP NOTE
Brief Postoperative Note    Patient: Charity Barajas  YOB: 1965  MRN: [id number]    Date of Procedure: 7/13/2020     Pre-Op Diagnosis: S46.011A RIGHT SHOULDER ROTATOR CUFF TEAR  M75.21 BICEPS TENDONITIS    Post-Op Diagnosis: Same as preoperative diagnosis. Adhesive capsulitis    Procedure(s):  RIGHT SHOULDER ARTHROSCOPIC ROTATOR CUFF REPAIR/ BICEPS TENOTOMY/ARTHREX/SPIDER/TMAX    Surgeon(s):  Mario Eastman MD    Surgical Assistant: Physician Assistant: MITRA Scott    Anesthesia: General     Estimated Blood Loss (mL): Minimal    Complications: None    Specimens: * No specimens in log *     Implants:   Implant Name Type Inv.  Item Serial No.  Lot No. LRB No. Used Action   ANCHOR Joelle Rafael SWIVELOCK 4.75MM - [id number]  Mary Cera SWIVELOCK 4.75MM  [employer] [id number] Right 2 Implanted   IMPL [employer] FT - [id number]  IMPL [employer] FT  [employer] [id number] Right 2 Implanted       Drains: * No LDAs found *    Findings: Right rotator cuff tear, supraspinatus/infraspinatus  Biceps tendinopathy  Adhesive capsulitis    Electronically Signed by Sandra Thomas MD on 7/13/2020 at 12:14 PM

## 2020-07-13 NOTE — INTERVAL H&P NOTE
Update History & Physical    The Patient's History and Physical of July 8,2020    was reviewed with the patient and I examined the patient. There was no change. The surgical site was confirmed by the patient and me. Plan:  The risk, benefits, expected outcome, and alternative to the recommended procedure have been discussed with the patient. Patient understands and wants to proceed with the procedure.     Electronically signed by Aye Lucas MD on 7/13/2020 at 9:40 AM

## 2020-07-13 NOTE — ANESTHESIA POSTPROCEDURE EVALUATION
Procedure(s):  RIGHT SHOULDER ARTHROSCOPIC ROTATOR CUFF REPAIR/POSSIBLE BICEPS TENODESIS/TENOTOMY/ARTHREX/SPIDER/TMAX.    general    Anesthesia Post Evaluation      Multimodal analgesia: multimodal analgesia used between 6 hours prior to anesthesia start to PACU discharge  Patient location during evaluation: PACU  Patient participation: complete - patient participated  Level of consciousness: agitated  Pain score: 1  Pain management: adequate  Airway patency: patent  Anesthetic complications: no  Cardiovascular status: acceptable  Respiratory status: acceptable  Hydration status: acceptable  Post anesthesia nausea and vomiting:  none  Final Post Anesthesia Temperature Assessment:  Normothermia (36.0-37.5 degrees C)      INITIAL Post-op Vital signs:   Vitals Value Taken Time   /57 7/13/2020  1:28 PM   Temp 36.6 °C (97.8 °F) 7/13/2020 12:27 PM   Pulse 90 7/13/2020  1:30 PM   Resp 17 7/13/2020  1:30 PM   SpO2 93 % 7/13/2020  1:30 PM   Vitals shown include unvalidated device data.

## 2020-07-13 NOTE — ANESTHESIA PREPROCEDURE EVALUATION
Relevant Problems   No relevant active problems       Anesthetic History               Review of Systems / Medical History      Pulmonary            Asthma : well controlled       Neuro/Psych         Psychiatric history     Cardiovascular    Hypertension          Hyperlipidemia    Exercise tolerance: >4 METS     GI/Hepatic/Renal         Renal disease: CRI       Endo/Other    Diabetes: type 2    Morbid obesity and arthritis     Other Findings              Physical Exam    Airway  Mallampati: I  TM Distance: > 6 cm  Neck ROM: normal range of motion   Mouth opening: Normal     Cardiovascular  Regular rate and rhythm,  S1 and S2 normal,  no murmur, click, rub, or gallop             Dental  No notable dental hx       Pulmonary  Breath sounds clear to auscultation               Abdominal  GI exam deferred       Other Findings            Anesthetic Plan    ASA: 3  Anesthesia type: general      Post-op pain plan if not by surgeon: peripheral nerve block single    Induction: Intravenous  Anesthetic plan and risks discussed with: Patient

## 2020-07-14 NOTE — OP NOTES
[facility]  OPERATIVE REPORT    Name:  Thai Mayo  MR#:   [id number]  :  1965  ACCOUNT #:  [de-identified]  DATE OF SERVICE:  2020    PREOPERATIVE DIAGNOSES:  1. Right shoulder rotator cuff tear. 2.  Right shoulder biceps tendinopathy. POSTOPERATIVE DIAGNOSES:  1. Right shoulder rotator cuff tear, supraspinatus, infraspinatus. 2.  Right shoulder biceps tendinopathy, long head. 3.  Right shoulder adhesive capsulitis. PROCEDURES PERFORMED:  1. Right shoulder arthroscopic rotator cuff repair. 2.  Right shoulder arthroscopic glenohumeral joint debridement and biceps tenotomy. SURGEON:  Yomaira Jose MD.    Diana Sibley PA-C. Yamile Arellano PA-C, was medically necessary for the procedure. She assisted with the patient positioning, rotator cuff repair, biceps tenotomy, glenohumeral debridement, wound closure, placement of sling and bandages, and transfer of the patient to the PACU. ANESTHESIA:  General with local.    IV FLUIDS:  600 mL of LR.    COMPLICATIONS:  None. SPECIMENS REMOVED:  None. IMPLANTS:  Arthrex suture anchors. ESTIMATED BLOOD LOSS:  Minimal.    INDICATIONS FOR PROCEDURE:  The patient is a 45-year-old female who presented to the office with right shoulder pain. An MRI revealed a rotator cuff tear. There is also concern for biceps pathology. After discussing the treatment options at length, she elected to undergo the procedure as described. DESCRIPTION OF PROCEDURE:  The patient was identified in the preoperative holding area. The right shoulder was marked as the operative extremity after confirmation with the patient. After discussing the risks and benefits of the procedure, informed consent was confirmed. Anesthesia performed a nerve block. The patient was then taken to the operating room. She was moved from the stretcher to the OR table. General anesthesia was induced and she was intubated.   She was brought into the beach chair position. The right arm was prepped and draped in normal sterile fashion. Prior to prepping and draping, a time-out was performed verifying the correct patient, procedure, and operative extremity with all in agreement. The antibiotics were given. Also prior to prepping and draping, the arm was taken through a gentle range of motion and found to have decreased forward flexion. With further manipulation, there was audible and palpable breaking up of the scar tissue, which emulated the full range of motion. The right arm was then prepped and draped in normal sterile fashion. The surgery began with a posterior portal placement. The arthroscope was brought into the glenohumeral joint. Extensive synovitis was noted in the glenohumeral joint. There was also erythema in the capsule. An anterior portal was made in the rotator interval under spinal needle localization. The subscapularis was noted to be intact. There was a tear from the anterior aspect of the supraspinatus extending into the infraspinatus. The biceps tendon was folded into the joint and there was noted to be tearing as the biceps exited the shoulder. The biceps tendon was tenotomized. The synovitis in the glenohumeral joint was debrided as well as the superior labrum. The anterior-inferior capsule was also debrided and the rotator interval was opened. The scope was then brought into the subacromial space. A lateral and posterolateral portal were made under spinal needle localization. An extensive subacromial debridement was performed. After debriding this in the subacromial space, the rotator cuff tear was visualized. There was debridement superior and inferior to the rotator cuff as there were some adhesions in the rotator cuff as well. Once the rotator cuff was able to be mobilized laterally to the greater tuberosity, the greater tuberosity was burred down to bleeding bone.   Suture anchors were placed at the anterior and posterior aspect of the greater tuberosity. The suture tails were then passed through the infraspinatus and supraspinatus rotator cuff tear. They were then tied down. One suture tail from each suture was then brought down to two separate lateral row anchors, one anterior and one posterior completing the tear repair. The subacromial space again was debrided of many loose tissues. Final pictures were taken. The arthroscopic instruments were removed. The portal sites were closed. Standard dressing was placed over the right shoulder. The right arm was placed in a sling. The patient was awakened from anesthesia, extubated, and taken to PACU in stable condition with a plan for discharge home.         Christiano Mckeon MD      [id number]  D:  07/13/2020 12:24  T:  07/13/2020 21:24  JOB #:  [id number]

## 2020-07-22 ENCOUNTER — OFFICE VISIT (OUTPATIENT)
Dept: ORTHOPEDIC SURGERY | Facility: CLINIC | Age: 55
End: 2020-07-22

## 2020-07-22 VITALS
SYSTOLIC BLOOD PRESSURE: 129 MMHG | DIASTOLIC BLOOD PRESSURE: 75 MMHG | HEART RATE: 100 BPM | TEMPERATURE: 96 F | RESPIRATION RATE: 12 BRPM | BODY MASS INDEX: 39.47 KG/M2 | OXYGEN SATURATION: 97 % | WEIGHT: 231.2 LBS | HEIGHT: 64 IN

## 2020-07-22 DIAGNOSIS — Z98.890 STATUS POST ARTHROSCOPY OF RIGHT SHOULDER: ICD-10-CM

## 2020-07-22 DIAGNOSIS — G89.18 POST-OP PAIN: ICD-10-CM

## 2020-07-22 DIAGNOSIS — S46.011A TRAUMATIC COMPLETE TEAR OF RIGHT ROTATOR CUFF, INITIAL ENCOUNTER: Primary | ICD-10-CM

## 2020-07-22 RX ORDER — OXYCODONE HYDROCHLORIDE 5 MG/1
5 TABLET ORAL
Qty: 28 TAB | Refills: 0 | Status: SHIPPED | OUTPATIENT
Start: 2020-07-22 | End: 2020-07-23 | Stop reason: SDUPTHER

## 2020-07-22 NOTE — PROGRESS NOTES
Pj Bonilla Micky  1965     HISTORY OF PRESENT ILLNESS  Anisha Blackwell is a 48 y.o. female who presents today for evaluation S/P Right shoulder arthroscopic rotator cuff repair, biceps tenotomy and glenohumeral debridment on 7/13/20. Patient has not been going to PT. Describes pain as a 10/10. Has been taking roxicodone for pain. Still has night pain. She has been compliant with her sling and restrictions. She is still unable to lay down in bed and has throbbing shoulder pain. She also reports some left sided facial sinus pain. When she was test for covid that person administering the test went very far she reports and she has had pain since then. Patient denies any fever, chills, chest pain, shortness of breath or calf pain. There are no new illness or injuries to report since last seen in the office. PHYSICAL EXAM:   Visit Vitals  /75 (BP 1 Location: Left arm)   Pulse 100   Temp (!) 96 °F (35.6 °C)   Resp 12   Ht 5' 4\" (1.626 m)   Wt 231 lb 3.2 oz (104.9 kg)   SpO2 97%   BMI 39.69 kg/m²      The patient is a well-developed, well-nourished female in no acute distress. The patient is alert and oriented times three. The patient appears to be well groomed. Mood and affect are normal.  ORTHOPEDIC EXAM of right shoulder:  Inspection: swelling present,  Bruising not present  Incision, clean, dry, intact, sutures in place  Passive glenohumeral abduction 0-20 degrees in the sling otherwise not assessed  Stability: Stable  Strength: n/a  2+ distal pulses    IMPRESSION:  S/P Right shoulder arthroscopic rotator cuff repair, biceps tenotomy and glenohumeral debridment    PLAN:   Pt doing well post operatively  Incisions cleaned. Sutures removed and steri strips applied  Surgery was discussed at length today. She will continue wearing sling until 4 weeks post op. Will start PT and exercises at next visit. Stressed to patient that nothing causes an increase in pain.   Will continue to watch the sinus pain. If it does not improve she will make an appt with her PCP. Pt given a refill of pain medication today. Roxicodone 5 mg Patient given pain medication for short term acute pain relief. Goal is to treat patient according to above plan and to ultimately have patient off all pain medications once appropriate. If chronic pain management is required beyond what is expected for current orthopedic problem, will refer patient to pain management.   was reviewed and will be reviewed with every medication refill request.   RTC 3 weeks    [facility] and Spine Specialist

## 2020-07-23 ENCOUNTER — TELEPHONE (OUTPATIENT)
Dept: ORTHOPEDIC SURGERY | Age: 55
End: 2020-07-23

## 2020-07-23 DIAGNOSIS — G89.18 POST-OP PAIN: ICD-10-CM

## 2020-07-23 RX ORDER — OXYCODONE HYDROCHLORIDE 5 MG/1
5 TABLET ORAL
Qty: 28 TAB | Refills: 0 | Status: SHIPPED | OUTPATIENT
Start: 2020-07-23 | End: 2020-07-30

## 2020-07-23 NOTE — TELEPHONE ENCOUNTER
The pharmacy we sent Oxycodone prescription to does not have medication in stock.  Please resend [facility] [address] phone [phone]    Patient [phone]

## 2020-07-30 ENCOUNTER — TELEPHONE (OUTPATIENT)
Dept: ORTHOPEDIC SURGERY | Facility: CLINIC | Age: 55
End: 2020-07-30

## 2020-07-30 NOTE — TELEPHONE ENCOUNTER
Patient verbalized understanding message from Jimbo Ortega. Patient will call back if symptoms persist or worsen.

## 2020-07-30 NOTE — TELEPHONE ENCOUNTER
Patient called stating that she is having complications with her arm after the surgery. She stated that she was told to keep her arm close to her body and use her other arm, which she has been doing but she has noticed that the motion is causing strain on her post-op arm. She also stated that yesterday and the day before her arm went completely numb from her shoulder gerardo to her thumb and that her arm is swelling again. She is not sure what she needs to do and would like advise.  Please advise patient at [phone]

## 2020-07-30 NOTE — TELEPHONE ENCOUNTER
The sling is probably irritating the nerves in her neck. She can take the sling off when seated. If she has motion of the operative arm while in the sling I would tighten the strap to help. If her shoulder is swelling some which can happen I recommend icing her shoulder to help. She can also take OTC NSAID to help with discomfort and swelling.

## 2020-08-05 ENCOUNTER — TELEPHONE (OUTPATIENT)
Dept: ORTHOPEDIC SURGERY | Age: 55
End: 2020-08-05

## 2020-08-05 DIAGNOSIS — G89.18 POST-OP PAIN: Primary | ICD-10-CM

## 2020-08-05 RX ORDER — OXYCODONE HYDROCHLORIDE 5 MG/1
5 TABLET ORAL
Qty: 21 TAB | Refills: 0 | Status: SHIPPED | OUTPATIENT
Start: 2020-08-05 | End: 2020-08-12 | Stop reason: SDUPTHER

## 2020-08-05 NOTE — TELEPHONE ENCOUNTER
PT IS REQUESTING REFILL FOR OXYCODONE    PHARMACY: [id number] [facility] Cosmo Bishop    PT R#[phone]

## 2020-08-12 DIAGNOSIS — G89.18 POST-OP PAIN: ICD-10-CM

## 2020-08-12 RX ORDER — OXYCODONE HYDROCHLORIDE 5 MG/1
5 TABLET ORAL
Qty: 21 TAB | Refills: 0 | Status: SHIPPED | OUTPATIENT
Start: 2020-08-12 | End: 2020-08-19 | Stop reason: SDUPTHER

## 2020-08-12 NOTE — TELEPHONE ENCOUNTER
Patient requests refill of oxyCODONE IR (Roxicodone) 5 mg immediate release tablet called in to [facility] on [address] [facility].

## 2020-08-12 NOTE — TELEPHONE ENCOUNTER
[facility]5/20 for a 7 d/s. Last Visit: 7/22/20 with MITRA Duran  Next Appointment: 8/21/20 with MITRA Duran  Previous Refill Encounter(s): 8/5/20 #21    Requested Prescriptions     Pending Prescriptions Disp Refills    oxyCODONE IR (Roxicodone) 5 mg immediate release tablet 21 Tab 0     Sig: Take 1 Tab by mouth every eight (8) hours as needed for Pain for up to 7 days. Max Daily Amount: 15 mg.

## 2020-08-19 DIAGNOSIS — G89.18 POST-OP PAIN: ICD-10-CM

## 2020-08-19 RX ORDER — OXYCODONE HYDROCHLORIDE 5 MG/1
5 TABLET ORAL
Qty: 21 TAB | Refills: 0 | Status: SHIPPED | OUTPATIENT
Start: 2020-08-19 | End: 2020-08-26

## 2020-08-19 NOTE — TELEPHONE ENCOUNTER
Patient called asking for a refill on her pain medication she asked if it can be sent to [facility]Y [id number], [address] [place] [address] Road.  Please advise patient at [phone]

## 2020-08-19 NOTE — TELEPHONE ENCOUNTER
[facility] R[facility]odone as 8/12/20 for a 7 d/s. Last Visit: 7/22/20 with MITRA Solomon  Next Appointment: 8/21/20 with MITRA Solomon  Previous Refill Encounter(s): 8/12/20 #21    Requested Prescriptions     Pending Prescriptions Disp Refills    oxyCODONE IR (Roxicodone) 5 mg immediate release tablet 21 Tab 0     Sig: Take 1 Tab by mouth every eight (8) hours as needed for Pain for up to 7 days. Max Daily Amount: 15 mg.

## 2020-08-21 ENCOUNTER — OFFICE VISIT (OUTPATIENT)
Dept: ORTHOPEDIC SURGERY | Facility: CLINIC | Age: 55
End: 2020-08-21

## 2020-08-21 VITALS — BODY MASS INDEX: 40.15 KG/M2 | TEMPERATURE: 97 F | WEIGHT: 235.2 LBS | HEIGHT: 64 IN

## 2020-08-21 DIAGNOSIS — S46.011A TRAUMATIC COMPLETE TEAR OF RIGHT ROTATOR CUFF, INITIAL ENCOUNTER: Primary | ICD-10-CM

## 2020-08-21 DIAGNOSIS — Z98.890 STATUS POST ARTHROSCOPY OF RIGHT SHOULDER: ICD-10-CM

## 2020-08-21 NOTE — PROGRESS NOTES
Rosy Elizabeth Micky  1965     HISTORY OF PRESENT ILLNESS  Anshul Rodarte is a 48 y.o. female who presents today for evaluation S/P Right shoulder arthroscopic rotator cuff repair, biceps tenotomy and glenohumeral debridment on 7/13/20. Patient has not been going to PT. Describes pain as a 8/10. Has been taking roxicodone for pain. Still has night pain. She has been compliant with her sling and restrictions. She is still unable to lay down in bed and has throbbing shoulder pain. Patient denies any fever, chills, chest pain, shortness of breath or calf pain. There are no new illness or injuries to report since last seen in the office. PHYSICAL EXAM:   Visit Vitals  Temp 97 °F (36.1 °C) (Oral)   Ht 5' 4\" (1.626 m)   Wt 235 lb 3.2 oz (106.7 kg)   BMI 40.37 kg/m²      The patient is a well-developed, well-nourished female in no acute distress. The patient is alert and oriented times three. The patient appears to be well groomed. Mood and affect are normal.  ORTHOPEDIC EXAM of right shoulder:  Inspection: swelling not present,  Bruising not present  Incision well healed  Passive glenohumeral abduction 0-40, 60 PFF, 10 PER  Stability: Stable  Strength: n/a  2+ distal pulses    IMPRESSION:  S/P Right shoulder arthroscopic rotator cuff repair, biceps tenotomy and glenohumeral debridment    PLAN:   Pt doing well post operatively  She is stiff on exam today which is normal at this point. D/C sling today. Will start PT and exercises, demonstrated in office today. Stressed to patient that nothing causes an increase in pain. Pt not given a refill of pain medication today.    RTC 4 weeks    Charl Plumb, PA-C [facility]

## 2020-08-24 ENCOUNTER — OFFICE VISIT (OUTPATIENT)
Dept: FAMILY MEDICINE CLINIC | Age: 55
End: 2020-08-24

## 2020-08-24 VITALS
TEMPERATURE: 98.5 F | HEIGHT: 64 IN | DIASTOLIC BLOOD PRESSURE: 70 MMHG | SYSTOLIC BLOOD PRESSURE: 105 MMHG | OXYGEN SATURATION: 93 % | WEIGHT: 235 LBS | RESPIRATION RATE: 18 BRPM | BODY MASS INDEX: 40.12 KG/M2 | HEART RATE: 86 BPM

## 2020-08-24 DIAGNOSIS — E11.9 TYPE 2 DIABETES MELLITUS WITHOUT COMPLICATION, WITHOUT LONG-TERM CURRENT USE OF INSULIN (HCC): ICD-10-CM

## 2020-08-24 DIAGNOSIS — E78.5 HYPERLIPIDEMIA, UNSPECIFIED HYPERLIPIDEMIA TYPE: ICD-10-CM

## 2020-08-24 DIAGNOSIS — J45.909 PERSISTENT ASTHMA WITHOUT COMPLICATION, UNSPECIFIED ASTHMA SEVERITY: Primary | ICD-10-CM

## 2020-08-24 DIAGNOSIS — I10 ESSENTIAL HYPERTENSION WITH GOAL BLOOD PRESSURE LESS THAN 130/80: ICD-10-CM

## 2020-08-24 RX ORDER — MONTELUKAST SODIUM 10 MG/1
10 TABLET ORAL DAILY
Qty: 90 TAB | Refills: 1 | Status: SHIPPED | OUTPATIENT
Start: 2020-08-24 | End: 2021-03-03 | Stop reason: SDUPTHER

## 2020-08-24 NOTE — PROGRESS NOTES
Mabelscott Jackson    CC: Follow-up for Chronic Disease Management    HPI:     Asthma:  -On no controller regimen  -Has been an issue recently with the change of the weather  -Admits she continues to smoke      HTN:  -Got requested lab work  -Taking BP medication as prescribed  -Denies any side effects or issues with diabetic medication  -Does not check BP at home  -Diet is unchanged from last visit (Low fat diet)  -Exercising daily for 20 minutes      DMII:  -Got requested lab work  -Taking diabetic medication as prescribed. -Denies any side effects or issues with diabetic medication  -Does not check blood sugar at home  -Diet is unchanged from last visit (Low fat diet)  -Exercising daily for 20 minutes      HLD:  -Got requested lab work  -Taking statin as prescribed  -Denies any side effects or issues with statin  -Diet is unchanged from last visit (Low fat diet)  -Exercising daily for 20 minutes      ROS: Positive items marked in RED  CON: fever, chills  Cardiovascular: palpitations, CP  Resp: SOB, cough  GI: nausea, vomiting, diarrhea  : dysuria, hematuria      Past Medical History:   Diagnosis Date    Allergic rhinitis     Anxiety and depression     Asthma 11/3/2010    Chronic pain in right shoulder     Depression 10/19/2015    Diabetes ([facility] [address]     Eczema 10/19/2015    Head pain 4/28/2014    HTN (hypertension)     Multiple environmental allergies     Nicotine dependence     Syphilis in female 3/2/2015       Past Surgical History:   Procedure Laterality Date    ENDOSCOPY, COLON, DIAGNOSTIC      HX TUBAL LIGATION Bilateral        Family History   Problem Relation Age of Onset    Breast Cancer Maternal Grandmother     Cancer Mother        Social History     Tobacco Use    Smoking status: Current Every Day Smoker     Packs/day: 0.50     Types: Cigarettes    Smokeless tobacco: Never Used    Tobacco comment: quit in january, smokes one occ   Substance Use Topics    Alcohol use:  Yes Alcohol/week: 2.0 standard drinks     Types: 2 Shots of liquor per week    Drug use: Yes     Types: Cocaine       Allergies   Allergen Reactions    Aspirin Nausea and Vomiting    Lisinopril Cough    Prednisone Swelling         Current Outpatient Medications:     oxyCODONE IR (Roxicodone) 5 mg immediate release tablet, Take 1 Tab by mouth every eight (8) hours as needed for Pain for up to 7 days. Max Daily Amount: 15 mg., Disp: 21 Tab, Rfl: 0    hydroCHLOROthiazide (HYDRODIURIL) 25 mg tablet, TAKE 1 TABLET BY MOUTH EVERY DAY, Disp: 30 Tab, Rfl: 1    metFORMIN ER (GLUCOPHAGE XR) 500 mg tablet, TAKE 1 TAB BY MOUTH DAILY (WITH DINNER). , Disp: 30 Tab, Rfl: 1    esomeprazole (NexIUM) 20 mg capsule, Take 20 mg by mouth as needed for Gastroesophageal Reflux Disease (GERD). , Disp: , Rfl:     losartan (COZAAR) 25 mg tablet, Take 25 mg by mouth daily. , Disp: , Rfl:     pravastatin (PRAVACHOL) 40 mg tablet, TAKE 1 TABLET BY MOUTH EVERY DAY AT NIGHT, Disp: 90 Tab, Rfl: 0    traZODone (DESYREL) 100 mg tablet, Take 1 Tab by mouth nightly., Disp: 90 Tab, Rfl: 1    buPROPion SR (WELLBUTRIN SR) 150 mg SR tablet, Take 1 Tab by mouth two (2) times a day., Disp: 180 Tab, Rfl: 2    ketoconazole (NIZORAL) 2 % topical cream, Apply once daily to cover the affected and immediate surrounding area for 2 weeks, Disp: 30 g, Rfl: 2    albuterol (PROVENTIL HFA, VENTOLIN HFA, PROAIR HFA) 90 mcg/actuation inhaler, Take 2 Puffs by inhalation every six (6) hours as needed for Wheezing., Disp: 3 Inhaler, Rfl: 2    Physical Exam:      /70   Pulse 86   Temp 98.5 °F (36.9 °C) (Oral)   Resp 18   Ht 5' 4\" (1.626 m)   Wt 235 lb (106.6 kg)   SpO2 93%   BMI 40.34 kg/m²     General: obese habitus, NAD, conversant  Eyes: sclera clear bilaterally, no discharge noted, eyelids normal in appearance  HENT: NCAT  Lungs: CTAB, normal respiratory effort and rate  CV: RRR, no MRGs  ABD: soft, non-tender, non-distended, normal bowel sounds  Ext: no peripheral edema or digital cyanosis noted, right arm in sling  Skin: normal temperature, turgor, color, and texture  Psych: alert and oriented to person, place and situation, normal affect  Neuro: speech normal, moving all extremities, gait normal    Diabetic Foot Exam:   Left Foot:   Visual Exam: normal    Pulse DP: 2+ (normal)   Filament test: normal sensation    Vibratory sensation: normal    Proprioception: normal      Right Foot:   Visual Exam: normal    Pulse DP: 2+ (normal)   Filament test: normal sensation    Vibratory sensation: normal    Proprioception: normal    Results for Kash Alfredo (MRN [id number]):   Ref. Range 6/15/2020 10:58   Sodium Latest Ref Range: 133 - 145 mmol/L 140   Potassium Latest Ref Range: 3.5 - 5.5 mmol/L 4.3   Chloride Latest Ref Range: 98 - 110 mmol/L 102   CO2 Latest Ref Range: 20 - 32 mmol/L 26   Anion gap Latest Units: mmol/L 12.0   Glucose Latest Ref Range: 70 - 99 mg/dL 107 (H)   BUN Latest Ref Range: 6 - 22 mg/dL 14   Creatinine Latest Ref Range: 0.5 - 1.2 mg/dL 1.0   Calcium Latest Ref Range: 8.4 - 10.5 mg/dL 9.5   Bilirubin, total Latest Ref Range: 0.2 - 1.2 mg/dL 0.2   Protein, total Latest Ref Range: 6.4 - 8.3 g/dL 6.7   Albumin Latest Ref Range: 3.5 - 5.0 g/dL 4.2   Globulin Latest Ref Range: 2.0 - 4.0 g/dL 2.5   A-G Ratio Latest Ref Range: 1.1 - 2.6 ratio 1.7   ALT Latest Ref Range: 5 - 40 U/L 15   AST Latest Ref Range: 10 - 37 U/L 12   Alk.  phosphatase Latest Ref Range: 25 - 115 U/L 89   Triglyceride Latest Ref Range: 40 - 149 mg/dL 82   Cholesterol, total Latest Ref Range: 110 - 200 mg/dL 124   HDL Cholesterol Latest Ref Range: >=40 mg/dL 35 (L)   CHOLESTEROL/HDL Latest Ref Range: 0.0 - 5.0  3.5   Non-HDL Cholesterol Latest Ref Range: <130 mg/dL 89   VLDL, calculated Latest Ref Range: 8 - 30 mg/dL 16   LDL, calculated Latest Ref Range: 50 - 99 mg/dL 72   LDL/HDL Ratio Unknown 2.0   Hemoglobin A1c, (calculated) Latest Ref Range: 4.8 - 5.6 % 6.2 (H) Assessment/Plan     Persistent Asthma, inadequately controlled:  -Started on trial of montelukast  -Advised on importance of smoking cessation  -Follow-up in 1 month      Hypertension, well controlled:  -BP at goal of <130/80  -Will continue current BP medication regimen  -Follow-up in 1 month        Hyperlipidemia, well controlled:  -10 year ASCVD risk calculated to be less than 9.9% (Exact risk unknown due to T.  Cholesterol <130), which is down from 34.6%  -Will continue current recommended moderated intensity statin therapy  -Follow-up in 1 month        DMII, well controlled:  -HGBA1c at goal of <7%  -Will continue current metformin regimen  -Diabetic foot exam done today  -Follow-up in 1 month        Tremayne Catherine MD  8/24/2020, 2:41 PM

## 2020-08-24 NOTE — PROGRESS NOTES
1. Have you been to the ER, urgent care clinic since your last visit? Hospitalized since your last visit? Yes When: 6-30-20 [facility]    2. Have you seen or consulted any other health care providers outside of the [address] [place] [address] since your last visit? Include any pap smears or colon screening.  Yes Orthopedic ligament repair right shoulder 7-13-20 and post op follow ups

## 2020-08-28 ENCOUNTER — HOSPITAL ENCOUNTER (OUTPATIENT)
Dept: PHYSICAL THERAPY | Age: 55
Discharge: HOME | End: 2020-08-28
Payer: MEDICAID

## 2020-08-28 PROCEDURE — 97162 PT EVAL MOD COMPLEX 30 MIN: CPT

## 2020-08-28 NOTE — PROGRESS NOTES
PT DAILY TREATMENT NOTE     Patient Name: Quinn Samaniego  Date:2020  : 1965  [x]  Patient  Verified  Payor: Alea Hoff / Plan: [place] [employer] / Product Type: Managed Care Medicaid /    In CCKT:6928  Out time:1230  Total Treatment Time (min): 45  Visit #: 1 of 8    Treatment Area: Pain in right shoulder [M25.511]    SUBJECTIVE  Pain Level (0-10 scale): 5  Any medication changes, allergies to medications, adverse drug reactions, diagnosis change, or new procedure performed?: [x] No    [] Yes (see summary sheet for update)  Subjective functional status/changes:   [x] See Eval form in paper chart     OBJECTIVE      45 min [x]Eval                  []Re-Eval                   With   [] TE   [] TA   [] neuro   [] other: Patient Education: [x] Review HEP    [] Progressed/Changed HEP based on:   [] positioning   [] body mechanics   [] transfers   [] heat/ice application    [] other:           Pain Level (0-10 scale) post treatment: 5    ASSESSMENT:   [x]  See Evaluation          Goals:  Short Term Goals: To be accomplished in 1 weeks:  1. Therapist to establish HEP for strength and ROM to improve ease with ADLs. Long Term Goals: To be accomplished in 4 weeks:  1. Patient will be independent with HEP to improve carryover of functional gains with ADLs between visits. Eval Status:n/a  2. Pt will increase right passive shoulder flexion/ER to 140/ 50 degrees to increase ease with ADLs. Eval Status:    Shoulder flexion PROM supine: 90 deg    Shoulder PROM ER in scaption: 10 deg  3. Pt will increase right active shoulder flexion to 90 degrees to increase ease with ADLs. Eval Status:will address as cleared by surgeon/protocol  4. Pt will increase FOTO score to 59 points to demonstrate improved functional lift & carry.     Eval Status:FOTO: 28    PLAN      [x]  Continue plan of care           Megan Renteria, PT 2020  12:46 PM

## 2020-08-28 NOTE — PROGRESS NOTES
[facility] [place] [address]  [address], [address] [phone] fax    Plan of Care/ Statement of Necessity for Physical Therapy Services           Patient name: Anamaria Castellanos Start of Care: 2020   Referral source: Mark Mata, [place] : 1965    Medical Diagnosis: Pain in right shoulder [M25.511]  Payor: [employer] / Plan: [address] [place] [id number] / Product Type: Managed Care Medicaid /  Onset Date:20    Treatment Diagnosis: right shoulder pain   Prior Hospitalization: see medical history Provider#: [id number]   Medications: Verified on Patient summary List    Comorbidities: depression, DM, HTN, asthma   Prior Level of Function: currently not working. Staying with son who A with ADLs    The Plan of Care and following information is based on the information from the initial evaluation. Assessment/ key information: Patient is a 48 y. o.female presenting with Pain in right shoulder [M25.511]. Ms. Wander Malcolm presents to initial PT evaluation s/p right shoulder RCR, bicep tenotomy, and glenohumeral debridement (DOS: 20). She arrives without sling and noted to be actively using the right UE. Patient was educated on procedure, protocol and precautions at length to protect surgical repair. Incisions are well healed without discoloration. She displays limited passive and active shoulder ROM consistent with post operative status. . Patient will benefit from skilled PT services to address deficits and facilitate return to premorbid activity level and promote improved quality of life.        Evaluation Complexity History HIGH Complexity :3+ comorbidities / personal factors will impact the outcome/ POC ; Examination MEDIUM Complexity : 3 Standardized tests and measures addressing body structure, function, activity limitation and / or participation in recreation  ;Presentation MEDIUM Complexity : Evolving with changing characteristics ;Clinical Decision Making MEDIUM Complexity : FOTO score of 26-74  Overall Complexity Rating: MEDIUM  Problem List: pain affecting function, decrease ROM, decrease strength, edema affecting function, impaired gait/ balance, decrease ADL/ functional abilitiies, decrease activity tolerance, decrease flexibility/ joint mobility and decrease transfer abilities   Treatment Plan may include any combination of the following: Therapeutic exercise, Therapeutic activities, Neuromuscular re-education, Physical agent/modality, Manual therapy, Patient education, Self Care training, Functional mobility training and Home safety training  Patient / Family readiness to learn indicated by: asking questions, trying to perform skills and interest  Persons(s) to be included in education: patient (P)  Barriers to Learning/Limitations: None  Patient Goal (s): stop the pain, use of my right arm with no pain.   Patient Self Reported Health Status: poor  Rehabilitation Potential: fair  Short Term Goals: To be accomplished in 1 weeks:  1. Therapist to establish HEP for strength and ROM to improve ease with ADLs. Long Term Goals: To be accomplished in 4 weeks:  1. Patient will be independent with HEP to improve carryover of functional gains with ADLs between visits. Eval Status:n/a  2. Pt will increase right passive shoulder flexion/ER to 140/ 50 degrees to increase ease with ADLs. Eval Status:    Shoulder flexion PROM supine: 90 deg    Shoulder PROM ER in scaption: 10 deg  3. Pt will increase right active shoulder flexion to 90 degrees to increase ease with ADLs. Eval Status:will address as cleared by surgeon/protocol  4. Pt will increase FOTO score to 59 points to demonstrate improved functional lift & carry. Eval Status:FOTO: 32    Frequency / Duration: Patient to be seen 2 times per week for 4 weeks.     Patient/ Caregiver education and instruction: Diagnosis, prognosis, self care, activity modification and exercises   [x] Plan of care has been reviewed with NEEL Ortez, PT 8/28/2020 12:42 PM    ________________________________________________________________________    I certify that the above Therapy Services are being furnished while the patient is under my care. I agree with the treatment plan and certify that this therapy is necessary.     Physician's Signature:____________Date:_________TIME:________    ** Signature, Date and Time must be completed for valid certification **    Please sign and return to [facility] [place] [address]  [address]   [facility], [address] [phone] fax

## 2020-09-09 ENCOUNTER — HOSPITAL ENCOUNTER (OUTPATIENT)
Dept: PHYSICAL THERAPY | Age: 55
Discharge: HOME | End: 2020-09-09
Payer: MEDICAID

## 2020-09-09 PROCEDURE — 97140 MANUAL THERAPY 1/> REGIONS: CPT

## 2020-09-09 PROCEDURE — 97530 THERAPEUTIC ACTIVITIES: CPT

## 2020-09-09 PROCEDURE — 97110 THERAPEUTIC EXERCISES: CPT

## 2020-09-09 NOTE — PROGRESS NOTES
PT DAILY TREATMENT NOTE 10-18    Patient Name: Leif Berry  Date:2020  : 1965  [x]  Patient  Verified  Payor: Olvin Puckett / Plan: [place] [employer] / Product Type: Managed Care Medicaid /    In time:837  Out time:912  Total Treatment Time (min): 35  Visit #: 2 of 8    Treatment Area: Pain in right shoulder [M25.511]    SUBJECTIVE  Pain Level (0-10 scale): 7  Any medication changes, allergies to medications, adverse drug reactions, diagnosis change, or new procedure performed?: [x] No    [] Yes (see summary sheet for update)  Subjective functional status/changes:   [] No changes reported  \"I've had some sharp pains in the front and top of my shoulder. \"    OBJECTIVE    Modality rationale: patient declined   Min Type Additional Details    [] Estim:  []Unatt       []IFC  []Premod                        []Other:  []w/ice   []w/heat  Position:  Location:    [] Estim: []Att    []TENS instruct  []NMES                    []Other:  []w/US   []w/ice   []w/heat  Position:  Location:    []  Traction: [] Cervical       []Lumbar                       [] Prone          []Supine                       []Intermittent   []Continuous Lbs:  [] before manual  [] after manual    []  Ultrasound: []Continuous   [] Pulsed                           []1MHz   []3MHz W/cm2:  Location:    []  Iontophoresis with dexamethasone         Location: [] Take home patch   [] In clinic    []  Ice     []  heat  []  Ice massage  []  Laser   []  Anodyne Position:  Location:    []  Laser with stim  []  Other:  Position:  Location:    []  Vasopneumatic Device Pressure:       [] lo [] med [] hi   Temperature: [] lo [] med [] hi   [] Skin assessment post-treatment:  []intact []redness- no adverse reaction    []redness - adverse reaction:     19 min Therapeutic Exercise:  [x] See flow sheet :   Rationale: increase ROM and increase strength to improve the patients ability to perform ADLs     8 min Therapeutic Activity:  [x] See flow sheet : pt education, home modification to avoid actively using her right shoulder   Rationale: increase ROM, increase strength, improve coordination, improve balance and increase proprioception  to improve the patients ability to improve mobility and ADLs    8 min Manual Therapy:  Sidelying scap mobs to right scapula. STM to medial border of right scapula and UT. PROM with grade 2 & 3 mobs to GHJ for elevation and ER    Pt consented to all manual interventions   Rationale: decrease pain, increase ROM, increase tissue extensibility, decrease trigger points and increase postural awareness to improve mobility and reaching         With   [x] TE   [x] TA   [] neuro   [] other: Patient Education: [x] Review HEP    [] Progressed/Changed HEP based on:   [x] positioning   [x] body mechanics   [] transfers   [] heat/ice application    [] other:      Other Objective/Functional Measures:      Pain Level (0-10 scale) post treatment: 7-8    ASSESSMENT/Changes in Function: Pt is making progress with her passive motion. Pt educated on making modifications at home to avoid actively using her right shoulder. She was heavily guarded initially with manual. Needs education on not pushing through painful arcs of motion. Patient will continue to benefit from skilled PT services to modify and progress therapeutic interventions, address functional mobility deficits, address ROM deficits, address strength deficits, analyze and address soft tissue restrictions, analyze and cue movement patterns, analyze and modify body mechanics/ergonomics, assess and modify postural abnormalities, address imbalance/dizziness and instruct in home and community integration to attain remaining goals. [x]  See Plan of Care  []  See progress note/recertification  []  See Discharge Summary         Progress towards goals / Updated goals:  Short Term Goals: To be accomplished in 1 weeks:  1.  Therapist to establish HEP for strength and ROM to improve ease with ADLs. MET   Long Term Goals: To be accomplished in 4 weeks:  1. Patient will be independent with HEP to improve carryover of functional gains with ADLs between visits. Eval Status:n/a   Reports some pain with clockwise/counterclockwise pendulum   2. Pt will increase right passive shoulder flexion/ER to 140/ 50 degrees to increase ease with ADLs. Eval Status:              Shoulder flexion PROM supine: 90 deg              Shoulder PROM ER in scaption: 10 deg   PROGRESSING; 119 deg flexion PROM  3. Pt will increase right active shoulder flexion to 90 degrees to increase ease with ADLs. Eval Status:will address as cleared by surgeon/protocol   Not cleared  4. Pt will increase FOTO score to 59 points to demonstrate improved functional lift & carry.                Eval Status:FOTO: 32   Assess at 30 day amish    PLAN  []  Upgrade activities as tolerated     [x]  Continue plan of care  []  Update interventions per flow sheet       []  Discharge due to:_  []  Other:_      Rakel Noel PTA, CSCS 9/9/2020  9:23 AM    Future Appointments   Date Time Provider Laurie Darden   9/9/2020  8:45 AM Christopher Printers, PTA [facility]   9/11/2020  8:45 AM Christopher Printers, PTA [facility]   9/15/2020  8:45 AM Christopher Printers, PTA [facility]   9/17/2020  8:45 AM Sandy La, PT [facility]   9/18/2020  1:00 PM MITRA [facility] K[facility]IT[facility]N [facility]   9/22/2020  8:45 AM Christopher Printers, PTA [facility]   9/24/2020  8:45 AM Christopher Printers, [facility]   9/29/2020 11:15 AM Reyna Lane MD [address] [facility] 1005

## 2020-09-11 ENCOUNTER — HOSPITAL ENCOUNTER (OUTPATIENT)
Dept: PHYSICAL THERAPY | Age: 55
Discharge: HOME | End: 2020-09-11
Payer: MEDICAID

## 2020-09-11 PROCEDURE — 97140 MANUAL THERAPY 1/> REGIONS: CPT

## 2020-09-11 PROCEDURE — 97110 THERAPEUTIC EXERCISES: CPT

## 2020-09-11 PROCEDURE — 97530 THERAPEUTIC ACTIVITIES: CPT

## 2020-09-11 NOTE — PROGRESS NOTES
PT DAILY TREATMENT NOTE 10-18    Patient Name: Shannon Chao  Date:2020  : 1965  [x]  Patient  Verified  Payor: Alejandro Gastelum / Plan: [place] [employer] / Product Type: Managed Care Medicaid /    In time:841  Out time:910  Total Treatment Time (min): 31  Visit #: 3 of 8    Treatment Area: Pain in right shoulder [M25.511]    SUBJECTIVE  Pain Level (0-10 scale): 8  Any medication changes, allergies to medications, adverse drug reactions, diagnosis change, or new procedure performed?: [x] No    [] Yes (see summary sheet for update)  Subjective functional status/changes:   [] No changes reported  \"My shoulder is sore. \"    OBJECTIVE    Modality rationale: patient declined   Min Type Additional Details    [] Estim:  []Unatt       []IFC  []Premod                        []Other:  []w/ice   []w/heat  Position:  Location:    [] Estim: []Att    []TENS instruct  []NMES                    []Other:  []w/US   []w/ice   []w/heat  Position:  Location:    []  Traction: [] Cervical       []Lumbar                       [] Prone          []Supine                       []Intermittent   []Continuous Lbs:  [] before manual  [] after manual    []  Ultrasound: []Continuous   [] Pulsed                           []1MHz   []3MHz W/cm2:  Location:    []  Iontophoresis with dexamethasone         Location: [] Take home patch   [] In clinic    []  Ice     []  heat  []  Ice massage  []  Laser   []  Anodyne Position:  Location:    []  Laser with stim  []  Other:  Position:  Location:    []  Vasopneumatic Device Pressure:       [] lo [] med [] hi   Temperature: [] lo [] med [] hi   [] Skin assessment post-treatment:  []intact []redness- no adverse reaction    []redness  adverse reaction:     13 min Therapeutic Exercise:  [x] See flow sheet :   Rationale: increase ROM and increase strength to improve the patients ability to perform ADLs    10 min Therapeutic Activity:  [x]  See flow sheet : pt education, home modification to avoid actively using her right shoulder   Rationale: increase ROM, increase strength, improve coordination, improve balance and increase proprioception  to improve the patients ability to improve mobility and reaching once cleared    8 min Manual Therapy:  Sidelying scap mobs to right scapula. STM to medial border of right scapula and UT. PROM with grade 2 & 3 mobs to GHJ for elevation and ER     Pt consented to all manual interventions   Rationale: decrease pain, increase ROM, increase tissue extensibility, decrease trigger points and increase postural awareness to improve mobility and reaching once cleared           With   [x] TE   [x] TA   [] neuro   [] other: Patient Education: [x] Review HEP    [] Progressed/Changed HEP based on:   [x] positioning   [x] body mechanics   [] transfers   [] heat/ice application    [] other:      Other Objective/Functional Measures:      Pain Level (0-10 scale) post treatment: 2    ASSESSMENT/Changes in Function: Pt was less guarded during manual today. Reports tenderness near inferior angle of right scapula. Some cues needed to avoid actively using her arm with exercises. We will progress per MD orders/protocol. Patient will continue to benefit from skilled PT services to modify and progress therapeutic interventions, address functional mobility deficits, address ROM deficits, address strength deficits, analyze and address soft tissue restrictions, analyze and cue movement patterns, analyze and modify body mechanics/ergonomics, assess and modify postural abnormalities, address imbalance/dizziness and instruct in home and community integration to attain remaining goals. [x]  See Plan of Care  []  See progress note/recertification  []  See Discharge Summary         Progress towards goals / Updated goals:  Short Term Goals: To be accomplished in 1 weeks:  1. Therapist to establish HEP for strength and ROM to improve ease with ADLs.               Jayne Wood Term Goals: To be accomplished in 4 weeks:  1. Patient will be independent with HEP to improve carryover of functional gains with ADLs between visits.             Eval Status:n/a              Reports some pain with clockwise/counterclockwise pendulum   2. Pt will increase right passive shoulder flexion/ER to 140/ 50 degrees to increase ease with ADLs.             Eval Status:              Shoulder flexion PROM supine: 90 deg              Shoulder PROM ER in scaption: 10 deg              PROGRESSING; 119 deg flexion PROM  3.  Pt will increase right active shoulder flexion to 90 degrees to increase ease with ADLs.             Eval Status:will address as cleared by surgeon/protocol              Not cleared  4.  Pt will increase FOTO score to 59 points to demonstrate improved functional lift & carry.               Eval Status:FOTO: 32              Assess at 30 day amish    PLAN  []  Upgrade activities as tolerated     [x]  Continue plan of care  []  Update interventions per flow sheet       []  Discharge due to:_  []  Other:_      Liza Sheikh PTA, CSCS 9/11/2020  9:14 AM    Future Appointments   Date Time Provider Laurie Darden   9/11/2020  8:45 AM Rachele Murry PTA [facility]   9/15/2020  8:45 AM [facility]   9/17/2020  8:45 AM Apple Washington PT [facility]   9/18/2020  1:00 PM MITRA [facility]   9/22/2020  8:45 AM [facility]   9/24/2020  8:45 AM [facility]   9/29/2020 11:15 AM Justine Nava MD [address] [facility] 1242

## 2020-09-15 ENCOUNTER — HOSPITAL ENCOUNTER (OUTPATIENT)
Dept: PHYSICAL THERAPY | Age: 55
Discharge: HOME | End: 2020-09-15
Payer: MEDICAID

## 2020-09-15 PROCEDURE — 97140 MANUAL THERAPY 1/> REGIONS: CPT

## 2020-09-15 PROCEDURE — 97530 THERAPEUTIC ACTIVITIES: CPT

## 2020-09-15 PROCEDURE — 97110 THERAPEUTIC EXERCISES: CPT

## 2020-09-15 NOTE — PROGRESS NOTES
PT DAILY TREATMENT NOTE 10-18    Patient Name: Jeanne Mccallum  Date:9/15/2020  : 1965  [x]  Patient  Verified  Payor: Lucia Priest / Plan: [place] [employer] / Product Type: Managed Care Medicaid /    In time:845  Out time:915  Total Treatment Time (min): 30  Visit #: 4 of 8    Treatment Area: Pain in right shoulder [M25.511]    SUBJECTIVE  Pain Level (0-10 scale): 6-7  Any medication changes, allergies to medications, adverse drug reactions, diagnosis change, or new procedure performed?: [x] No    [] Yes (see summary sheet for update)  Subjective functional status/changes:   [] No changes reported  \"I'm sore. \"    OBJECTIVE    Modality rationale: patient declined   Min Type Additional Details    [] Estim:  []Unatt       []IFC  []Premod                        []Other:  []w/ice   []w/heat  Position:  Location:    [] Estim: []Att    []TENS instruct  []NMES                    []Other:  []w/US   []w/ice   []w/heat  Position:  Location:    []  Traction: [] Cervical       []Lumbar                       [] Prone          []Supine                       []Intermittent   []Continuous Lbs:  [] before manual  [] after manual    []  Ultrasound: []Continuous   [] Pulsed                           []1MHz   []3MHz W/cm2:  Location:    []  Iontophoresis with dexamethasone         Location: [] Take home patch   [] In clinic    []  Ice     []  heat  []  Ice massage  []  Laser   []  Anodyne Position:  Location:    []  Laser with stim  []  Other:  Position:  Location:    []  Vasopneumatic Device Pressure:       [] lo [] med [] hi   Temperature: [] lo [] med [] hi   [] Skin assessment post-treatment:  []intact []redness- no adverse reaction    []redness  adverse reaction:     12 min Therapeutic Exercise:  [x] See flow sheet :   Rationale: increase ROM and increase strength to improve the patients ability to perform ADLs    10 min Therapeutic Activity:  [x]  See flow sheet : pt education, home modification to avoid actively using her right shoulder   Rationale: increase ROM, increase strength, improve coordination, improve balance and increase proprioception  to improve the patients ability to improve mobility and ADL performance    8 min Manual Therapy:  Sidelying scap mobs to right scapula. STM to medial border of right scapula and UT. PROM with grade 2 & 3 mobs to GHJ for elevation and ER     Pt consented to all manual interventions   Rationale: decrease pain, increase ROM, increase tissue extensibility, decrease trigger points and increase postural awareness to improve mobility and reaching once cleared         With   [x] TE   [x] TA   [] neuro   [] other: Patient Education: [x] Review HEP    [] Progressed/Changed HEP based on:   [x] positioning   [x] body mechanics   [] transfers   [] heat/ice application    [] other:      Other Objective/Functional Measures:      Pain Level (0-10 scale) post treatment: 5    ASSESSMENT/Changes in Function: Pt reports bicep and anterior shoulder discomfort from walking with her elbow flexed to 90 deg and into the body. Educated pt on letting her elbow extend and walk with a natural arm swing. Her passive motion is progressing well. Pain severity appears to be declining overall. We will progress per MD orders. Patient will continue to benefit from skilled PT services to modify and progress therapeutic interventions, address functional mobility deficits, address ROM deficits, address strength deficits, analyze and address soft tissue restrictions, analyze and cue movement patterns, analyze and modify body mechanics/ergonomics, assess and modify postural abnormalities, address imbalance/dizziness and instruct in home and community integration to attain remaining goals. [x]  See Plan of Care  []  See progress note/recertification  []  See Discharge Summary         Progress towards goals / Updated goals:  Short Term Goals: To be accomplished in 1 weeks:  1.  Therapist to establish HEP for strength and ROM to improve ease with ADLs.             MET   Long Term Goals: To be accomplished in 4 weeks:  1. Patient will be independent with HEP to improve carryover of functional gains with ADLs between visits.             Eval Status:n/a              Reports some pain with clockwise/counterclockwise pendulum   2. Pt will increase right passive shoulder flexion/ER to 140/ 50 degrees to increase ease with ADLs.             Eval Status:              Shoulder flexion PROM supine: 90 deg              Shoulder PROM ER in scaption: 10 deg              PROGRESSING; 119 deg flexion PROM  3.  Pt will increase right active shoulder flexion to 90 degrees to increase ease with ADLs.             Eval Status:will address as cleared by surgeon/protocol              NTW cleared  4.  Pt will increase FOTO score to 59 points to demonstrate improved functional lift & carry.               Eval Status:FOTO: 32              Assess at 30 day amish    PLAN  []  Upgrade activities as tolerated     [x]  Continue plan of care  []  Update interventions per flow sheet       []  Discharge due to:_  []  Other:_      Jose A Muniz PTA, [facility] 9/15/2020  9:17 AM    Future Appointments   Date Time Provider Laurie Darden   9/15/2020  8:45 AM [facility]   9/17/2020  8:45 AM [facility]   9/18/2020  1:00 PM MITRA [facility] KAITLYN [facility]   9/22/2020  8:45 AM Mahesh Mckenna PTA [facility]   9/24/2020  8:45 AM [facility]   9/29/2020 11:15 AM Princess Broderick MD [address] [facility] 5974

## 2020-09-17 ENCOUNTER — HOSPITAL ENCOUNTER (OUTPATIENT)
Dept: PHYSICAL THERAPY | Age: 55
Discharge: HOME | End: 2020-09-17
Payer: MEDICAID

## 2020-09-17 PROCEDURE — 97140 MANUAL THERAPY 1/> REGIONS: CPT

## 2020-09-17 PROCEDURE — 97110 THERAPEUTIC EXERCISES: CPT

## 2020-09-17 PROCEDURE — 97530 THERAPEUTIC ACTIVITIES: CPT

## 2020-09-17 NOTE — PROGRESS NOTES
PT DAILY TREATMENT NOTE 10-18    Patient Name: Renuka Grullon  Date:2020  : 1965  [x]  Patient  Verified  Payor: [employer] / Plan: [facility] [employer] [employer] / Product Type: Managed Care Medicaid /    In time:840  Out time:925  Total Treatment Time (min): 45  Visit #: 5 of 8    Treatment Area: Pain in right shoulder [M25.511]    SUBJECTIVE  Pain Level (0-10 scale): 6  Any medication changes, allergies to medications, adverse drug reactions, diagnosis change, or new procedure performed?: [x] No    [] Yes (see summary sheet for update)  Subjective functional status/changes:   [] No changes reported  \"doing better. \"    OBJECTIVE              Modality rationale: decrease edema, decrease inflammation and decrease pain to improve the patients ability to improve ease with ADL.s    Min Type Additional Details    [] Estim:  []Unatt       []IFC  []Premod                        []Other:  []w/ice   []w/heat  Position:  Location:    [] Estim: []Att    []TENS instruct  []NMES                    []Other:  []w/US   []w/ice   []w/heat  Position:  Location:    []  Traction: [] Cervical       []Lumbar                       [] Prone          []Supine                       []Intermittent   []Continuous Lbs:  [] before manual  [] after manual    []  Ultrasound: []Continuous   [] Pulsed                           []1MHz   []3MHz Location:  W/cm2:    []  Iontophoresis with dexamethasone         Location: [] Take home patch   [] In clinic   10 [x]  Ice     []  heat  []  Ice massage  []  Laser   []  Anodyne Position:seated  Location:right shoulder    []  Laser with stim  []  Other: Position:  Location:    []  Vasopneumatic Device Pressure:       [] lo [] med [] hi   Temperature: [] lo [] med [] hi   [x] Skin assessment post-treatment:  [x]intact []redness- no adverse reaction    []redness  adverse reaction:         12 min Therapeutic Exercise:  [x]?  See flow sheet :   Rationale: increase ROM and increase strength to improve the patients ability to perform ADLs     15 min Therapeutic Activity:  [x]? See flow sheet : pt education, home modification to avoid actively using her right shoulder   Rationale: increase ROM, increase strength, improve coordination, improve balance and increase proprioception  to improve the patients ability to improve mobility and ADL performance     8 min Manual Therapy:  Sidelying scap mobs to right scapula. STM to medial border of right scapula and UT. PROM with grade 2 & 3 mobs to J for elevation and ER     Pt consented to all manual interventions   Rationale: decrease pain, increase ROM, increase tissue extensibility, decrease trigger points and increase postural awareness to improve mobility and reaching once cleared           With   [] TE   [] TA   [] neuro   [] other: Patient Education: [x] Review HEP    [] Progressed/Changed HEP based on:   [] positioning   [] body mechanics   [] transfers   [] heat/ice application    [] other:      Other Objective/Functional Measures:      Pain Level (0-10 scale) post treatment: 8    ASSESSMENT/Changes in Function: PROM improved, pain elevated some post manual therapy so used CP with some relief. Patient to f/u with MD tomorrow. Patient will continue to benefit from skilled PT services to modify and progress therapeutic interventions, address functional mobility deficits, address ROM deficits, address strength deficits, analyze and address soft tissue restrictions, analyze and cue movement patterns, analyze and modify body mechanics/ergonomics, assess and modify postural abnormalities, address imbalance/dizziness and instruct in home and community integration to attain remaining goals. []  See Plan of Care  []  See progress note/recertification  []  See Discharge Summary         Progress towards goals / Updated goals:  Short Term Goals: To be accomplished in 1 weeks:  1.  Therapist to establish HEP for strength and ROM to improve ease with ADLs.             MET   Long Term Goals: To be accomplished in 4 weeks:  1. Patient will be independent with HEP to improve carryover of functional gains with ADLs between visits.             Eval Status:n/a              Reports some pain with clockwise/counterclockwise pendulum   2. Pt will increase right passive shoulder flexion/ER to 140/ 50 degrees to increase ease with ADLs.             Eval Status:              Shoulder flexion PROM supine: 90 deg              Shoulder PROM ER in scaption: 10 deg              PROGRESSING; 119 deg flexion PROM  3.  Pt will increase right active shoulder flexion to 90 degrees to increase ease with ADLs.             Eval Status:will address as cleared by surgeon/protocol              DMF cleared  4.  Pt will increase FOTO score to 59 points to demonstrate improved functional lift & carry.               Eval Status:FOTO: 32              Assess at 30 day amish    PLAN  []  Upgrade activities as tolerated     [x]  Continue plan of care  []  Update interventions per flow sheet       []  Discharge due to:_  []  Other:_      Kiesha Aguilar, PT 9/17/2020  8:49 AM    Future Appointments   Date Time Provider Laurie Darden   9/18/2020  1:00 PM MITRA Pruitt [facility] SCHED   9/22/2020  8:45 AM Marcia Ortega, PTA [facility]   9/24/2020  8:45 AM Marcia Ortega, PTA [facility]   9/29/2020  8:45 AM Dexter Lima [facility]   9/29/2020 11:15 AM Norah Umaña MD [facility] SCHED   10/1/2020  8:45 AM Valdez Weaver, PT [facility]   10/6/2020  8:15 AM Milagros Jimenez, PTA [facility]   10/8/2020  8:15 AM Marcia Ortega, PTA [facility]   10/13/2020  8:15 AM Vladez Weaver, PT [facility]   10/15/2020  8:15 AM Aniyah Gonzalez, PT [facility]

## 2020-09-18 ENCOUNTER — OFFICE VISIT (OUTPATIENT)
Dept: ORTHOPEDIC SURGERY | Facility: CLINIC | Age: 55
End: 2020-09-18

## 2020-09-18 VITALS
HEART RATE: 94 BPM | RESPIRATION RATE: 16 BRPM | TEMPERATURE: 96.8 F | SYSTOLIC BLOOD PRESSURE: 113 MMHG | DIASTOLIC BLOOD PRESSURE: 78 MMHG | OXYGEN SATURATION: 95 % | HEIGHT: 64 IN | WEIGHT: 237.4 LBS | BODY MASS INDEX: 40.53 KG/M2

## 2020-09-18 DIAGNOSIS — S46.011A TRAUMATIC COMPLETE TEAR OF RIGHT ROTATOR CUFF, INITIAL ENCOUNTER: Primary | ICD-10-CM

## 2020-09-18 DIAGNOSIS — K21.9 GASTROESOPHAGEAL REFLUX DISEASE, ESOPHAGITIS PRESENCE NOT SPECIFIED: ICD-10-CM

## 2020-09-18 DIAGNOSIS — Z98.890 STATUS POST ARTHROSCOPY OF RIGHT SHOULDER: ICD-10-CM

## 2020-09-18 DIAGNOSIS — I10 ESSENTIAL HYPERTENSION: ICD-10-CM

## 2020-09-18 NOTE — PROGRESS NOTES
Alin Weeks  1965     HISTORY OF PRESENT ILLNESS  Ana Nayak is a 48 y.o. female who presents today for evaluation S/P Right shoulder arthroscopic rotator cuff repair, biceps tenotomy and glenohumeral debridment on 7/13/20. Patient has been going to PT. Describes pain as a 9/10. Has been taking nothing for pain. Still has night pain. She has been compliant with her exercises and restrictions. She would like to take her diclofenac to help with achy pain. Patient denies any fever, chills, chest pain, shortness of breath or calf pain. There are no new illness or injuries to report since last seen in the office. PHYSICAL EXAM:   Visit Vitals  /78 (BP 1 Location: Left arm, BP Patient Position: Sitting)   Pulse 94   Temp 96.8 °F (36 °C) (Temporal)   Resp 16   Ht 5' 4\" (1.626 m)   Wt 237 lb 6.4 oz (107.7 kg)   SpO2 95%   BMI 40.75 kg/m²      The patient is a well-developed, well-nourished female in no acute distress. The patient is alert and oriented times three. The patient appears to be well groomed. Mood and affect are normal.  ORTHOPEDIC EXAM of right shoulder:  Inspection: swelling not present,  Bruising not present  Incision well healed  Passive glenohumeral abduction 0-70, 110 PFF, 30 PER  Stability: Stable  Strength: n/a  2+ distal pulses    IMPRESSION:  S/P Right shoulder arthroscopic rotator cuff repair, biceps tenotomy and glenohumeral debridment    PLAN:   Pt doing well post operatively  She has made progress with her motion since last visit. Will continue PT and exercises to work on ROM  She can take diclofenac to help with pain. She can also use volteran gel to help with pain. Stressed to patient that nothing causes an increase in pain. Pt not given a refill of pain medication today.    RTC 4 weeks    MIREYA [facility]

## 2020-09-20 DIAGNOSIS — I10 ESSENTIAL (PRIMARY) HYPERTENSION: ICD-10-CM

## 2020-09-21 DIAGNOSIS — E11.9 TYPE 2 DIABETES MELLITUS WITHOUT COMPLICATION, WITHOUT LONG-TERM CURRENT USE OF INSULIN (HCC): ICD-10-CM

## 2020-09-22 ENCOUNTER — APPOINTMENT (OUTPATIENT)
Dept: PHYSICAL THERAPY | Age: 55
End: 2020-09-22
Payer: MEDICAID

## 2020-09-24 ENCOUNTER — HOSPITAL ENCOUNTER (OUTPATIENT)
Dept: PHYSICAL THERAPY | Age: 55
Discharge: HOME | End: 2020-09-24
Payer: MEDICAID

## 2020-09-24 PROCEDURE — 97530 THERAPEUTIC ACTIVITIES: CPT

## 2020-09-24 PROCEDURE — 97110 THERAPEUTIC EXERCISES: CPT

## 2020-09-24 PROCEDURE — 97140 MANUAL THERAPY 1/> REGIONS: CPT

## 2020-09-24 RX ORDER — FAMOTIDINE 20 MG/1
TABLET, FILM COATED ORAL
Qty: 60 TAB | Refills: 2 | Status: SHIPPED | OUTPATIENT
Start: 2020-09-24 | End: 2021-01-28 | Stop reason: SDUPTHER

## 2020-09-24 RX ORDER — LOSARTAN POTASSIUM 25 MG/1
TABLET ORAL
Qty: 90 TAB | Refills: 1 | Status: SHIPPED | OUTPATIENT
Start: 2020-09-24 | End: 2021-03-17 | Stop reason: SDUPTHER

## 2020-09-24 RX ORDER — HYDROCHLOROTHIAZIDE 25 MG/1
TABLET ORAL
Qty: 30 TAB | Refills: 1 | Status: SHIPPED | OUTPATIENT
Start: 2020-09-24 | End: 2020-10-30 | Stop reason: SDUPTHER

## 2020-09-24 NOTE — PROGRESS NOTES
PT DAILY TREATMENT NOTE 10-18    Patient Name: Quinn Samaniego  Date:2020  : 1965  [x]  Patient  Verified  Payor: Alea Hoff / Plan: [facility] [employer] [employer] / Product Type: Managed Care Medicaid /    In time:844  Out time:932  Total Treatment Time (min): 48  Visit #: 6 of 8    Treatment Area: Pain in right shoulder [M25.511]    SUBJECTIVE  Pain Level (0-10 scale): 8  Any medication changes, allergies to medications, adverse drug reactions, diagnosis change, or new procedure performed?: [x] No    [] Yes (see summary sheet for update)  Subjective functional status/changes:   [] No changes reported  \"I'm hurting this morning. \"    OBJECTIVE    Modality rationale: decrease pain to improve the patients ability to improve mobility and reaching    Min Type Additional Details    [] Estim:  []Unatt       []IFC  []Premod                        []Other:  []w/ice   []w/heat  Position:  Location:    [] Estim: []Att    []TENS instruct  []NMES                    []Other:  []w/US   []w/ice   []w/heat  Position:  Location:    []  Traction: [] Cervical       []Lumbar                       [] Prone          []Supine                       []Intermittent   []Continuous Lbs:  [] before manual  [] after manual    []  Ultrasound: []Continuous   [] Pulsed                           []1MHz   []3MHz W/cm2:  Location:    []  Iontophoresis with dexamethasone         Location: [] Take home patch   [] In clinic   10 [x]  Ice     []  heat  []  Ice massage  []  Laser   []  Anodyne Position: seated   Location: right shoulder    []  Laser with stim  []  Other:  Position:  Location:    []  Vasopneumatic Device Pressure:       [] lo [] med [] hi   Temperature: [] lo [] med [] hi   [x] Skin assessment post-treatment:  [x]intact []redness- no adverse reaction    []redness  adverse reaction:     20 min Therapeutic Exercise:  [x] See flow sheet :   Rationale: increase ROM and increase strength to improve the patients ability to perform ADLs    10 min Therapeutic Activity:  [x]  See flow sheet : pt education, home modification to avoid actively using her right shoulder   Rationale: increase ROM, increase strength, improve coordination, improve balance and increase proprioception  to improve the patients ability to improve mobility and ADL performance    8 min Manual Therapy: Sidelying scap mobs to right scapula. STM to medial border of right scapula and UT. PROM with grade 2 & 3 mobs to GHJ for elevation and ER     Pt consented to all manual interventions   Rationale: decrease pain, increase ROM, increase tissue extensibility, decrease trigger points and increase postural awareness to improve mobility and reaching once cleared           With   [x] TE   [x] TA   [] neuro   [] other: Patient Education: [x] Review HEP    [] Progressed/Changed HEP based on:   [x] positioning   [x] body mechanics   [] transfers   [] heat/ice application    [] other:      Other Objective/Functional Measures:   FOTO 48    PROM right shoulder flexion 143 deg  PROM right shoulder ER 30 deg    AROM right shoulder flexion 100 deg     Pain Level (0-10 scale) post treatment: 8    ASSESSMENT/Changes in Function: Ms. Chano Clayton has been a pleasure to treat and reports 25% improvement since beginning therapy. Pt making great progress with her PROM and AROM. Her pain remains elevated and reports a continued tenderness to touch over anterior shoulder. Overall making good progress at this point in her protocol. We will continue with PT to address her remaining functional deficits.     Patient will continue to benefit from skilled PT services to modify and progress therapeutic interventions, address functional mobility deficits, address ROM deficits, address strength deficits, analyze and address soft tissue restrictions, analyze and cue movement patterns, analyze and modify body mechanics/ergonomics, assess and modify postural abnormalities, address imbalance/dizziness and instruct in home and community integration to attain remaining goals. [x]  See Plan of Care  [x]  See progress note/recertification  []  See Discharge Summary         Progress towards goals / Updated goals:  Short Term Goals: To be accomplished in 1 weeks:  1. Therapist to establish HEP for strength and ROM to improve ease with ADLs.             MET   Long Term Goals: To be accomplished in 4 weeks:  1. Patient will be independent with HEP to improve carryover of functional gains with ADLs between visits.             Eval Status:n/a              PROGRESSING; reports daily compliance, will update as we progress with protocol. 2. Pt will increase right passive shoulder flexion/ER to 140/ 50 degrees to increase ease with ADLs.             Eval Status:              Shoulder flexion PROM supine: 90 deg              Shoulder PROM ER in scaption: 10 deg              PROGRESSING; 143 deg flexion PROM, 30 deg ER PROM  3.  Pt will increase right active shoulder flexion to 90 degrees to increase ease with ADLs.             Eval Status:will address as cleared by surgeon/protocol              MET; 100 deg  4. Pt will increase FOTO score to 59 points to demonstrate improved functional lift & carry.               Eval Status:FOTO: 32              PROGRESSING; 48    Functional Gains:  strength, bathing/showering, dressing  Functional Deficits: tenderness to touch at anterior shoulder, reaching overhead, lifting, reaching behind self, reaching to pull covers over self  % improvement: 25%  Pain   Average: 7-8/10       Best: 5/10     Worst: 10/10  Patient Goal: \"everything I'm supposed to be doing and lift stuff too. \"    PLAN  []  Upgrade activities as tolerated     [x]  Continue plan of care  []  Update interventions per flow sheet       []  Discharge due to:_  []  Other:_      Yi Monteiro, NEEL, CSCS 9/24/2020  9:36 AM    Future Appointments   Date Time Provider Laurie Darden 9/24/2020  8:45 AM Jose Miguel Moura, PTA [facility]   9/29/2020  8:45 AM Minoo Treviño [facility]   9/29/2020 11:15 AM Ming Cochran MD [facility] BS AMB   10/1/2020  8:45 AM Floyd Donis, PT [facility]   10/6/2020  8:15 AM Minoo Treviño [facility]   10/8/2020  8:15 AM Jose Miguel Moura, PTA [facility]   10/13/2020  8:15 AM Floyd Donis, PT [facility]   10/15/2020  8:15 AM Floyd Donis, PT [facility]

## 2020-09-25 RX ORDER — METFORMIN HYDROCHLORIDE 500 MG/1
500 TABLET, EXTENDED RELEASE ORAL
Qty: 30 TAB | Refills: 1 | Status: SHIPPED | OUTPATIENT
Start: 2020-09-25 | End: 2020-10-30 | Stop reason: SDUPTHER

## 2020-09-25 RX ORDER — FLUTICASONE PROPIONATE 50 MCG
SPRAY, SUSPENSION (ML) NASAL
Qty: 1 BOTTLE | Refills: 4 | Status: SHIPPED | OUTPATIENT
Start: 2020-09-25 | End: 2021-03-18 | Stop reason: SDUPTHER

## 2020-09-29 ENCOUNTER — VIRTUAL VISIT (OUTPATIENT)
Dept: FAMILY MEDICINE CLINIC | Age: 55
End: 2020-09-29
Payer: MEDICAID

## 2020-09-29 ENCOUNTER — HOSPITAL ENCOUNTER (OUTPATIENT)
Dept: PHYSICAL THERAPY | Age: 55
Discharge: HOME | End: 2020-09-29
Payer: MEDICAID

## 2020-09-29 DIAGNOSIS — J45.909 PERSISTENT ASTHMA WITHOUT COMPLICATION, UNSPECIFIED ASTHMA SEVERITY: Primary | ICD-10-CM

## 2020-09-29 PROCEDURE — 97140 MANUAL THERAPY 1/> REGIONS: CPT

## 2020-09-29 PROCEDURE — 99213 OFFICE O/P EST LOW 20 MIN: CPT | Performed by: FAMILY MEDICINE

## 2020-09-29 PROCEDURE — 97110 THERAPEUTIC EXERCISES: CPT

## 2020-09-29 PROCEDURE — 97530 THERAPEUTIC ACTIVITIES: CPT

## 2020-09-29 NOTE — PROGRESS NOTES
[phone]     1. Have you been to the ER, urgent care clinic since your last visit? Hospitalized since your last visit? No    2. Have you seen or consulted any other health care providers outside of the [address] [place] [address] since your last visit? Include any pap smears or colon screening.  No

## 2020-09-29 NOTE — PROGRESS NOTES
[facility] - [place] [address]  [address], [phone] fax    Physician Update  [x] Progress Note  [] Discharge Summary    Patient name: Altagracia Caraballo Start of Care: 2020   [facility] 1965                Medical Diagnosis: Pain in right shoulder [M25.511]  Payor: [employer] / Plan: [address] [place] [address] / Product Type: Managed Care Medicaid /  Onset Date:20                Treatment Diagnosis: right shoulder pain   Prior Hospitalization: see medical history Provider#: [id number]   Medications: Verified on Patient summary List    Comorbidities: depression, DM, HTN, asthma   Prior Level of Function: currently not working. Staying with son who A with ADLs       Visits from Start of Care: 7    Missed Visits: 1      Progress towards Goals:  Short Term Goals: To be accomplished in 1 weeks:  1. Therapist to establish HEP for strength and ROM to improve ease with ADLs.             MET   Long Term Goals: To be accomplished in 4 weeks:  1. Patient will be independent with HEP to improve carryover of functional gains with ADLs between visits.             Eval Status:n/a              PROGRESSING; reports daily compliance, will update as we progress with protocol. 2. Pt will increase right passive shoulder flexion/ER to 140/ 50 degrees to increase ease with ADLs.             Eval Status:              Shoulder flexion PROM supine: 90 deg              Shoulder PROM ER in scaption: 10 deg              PROGRESSING; 143 deg flexion PROM, 30 deg ER PROM  3.  Pt will increase right active shoulder flexion to 90 degrees to increase ease with ADLs.             Eval Status:will address as cleared by surgeon/protocol              MET; 100 deg  4.  Pt will increase FOTO score to 59 points to demonstrate improved functional lift & carry.               Eval Status:FOTO: 32              PROGRESSING; 48     Functional Gains:  strength, bathing/showering, dressing  Functional Deficits: tenderness to touch at anterior shoulder, reaching overhead, lifting, reaching behind self, reaching to pull covers over self  % improvement: 25%  Pain   Average: 7-8/10                  Best: 5/10                Worst: 10/10  Patient Goal: \"everything I'm supposed to be doing and lift stuff too. \"     Goals: to be achieved in 4 weeks:   1. Patient will be independent with HEP to improve carryover of functional gains with ADLs between visits.             PN status; reports daily compliance, will update as we progress with protocol. 2. Pt will increase right passive shoulder flexion/ER to 140/ 50 degrees to increase ease with ADLs.             PN status;  143 deg flexion PROM, 30 deg ER PROM  3.  Pt will increase right active shoulder flexion to 160 degrees to increase ease with ADLs.             PN status;  100 deg  4.  Pt will increase FOTO score to 59 points to demonstrate improved functional lift & carry.               PN status;  48    ASSESSMENT/RECOMMENDATIONS:  [x]Continue therapy per initial plan/protocol at a frequency of  2 x per week for 4 weeks  []Continue therapy with the following recommended changes:_____________________      _____________________________________________________________________  []Discontinue therapy progressing towards or have reached established goals  []Discontinue therapy due to lack of appreciable progress towards goals  []Discontinue therapy due to lack of attendance or compliance  []Await Physician's recommendations/decisions regarding therapy  []Other:________________________________________________________________    Thank you for this referral.   Duane Perez, PT 9/29/2020 1:43 PM  NOTE TO PHYSICIAN:  [address] TO [facility]: ([phone]  If you are unable to process this request in 24 hours please contact our office: [phone]    []  I have read the above report and request that my patient continue as recommended. []  I have read the above report and request that my patient continue therapy with the following changes/special instructions:________________________________________  []I have read the above report and request that my patient be discharged from therapy.     [de-identified] Signature:____________Date:_________TIME:________    [employer], Date and Time must be completed for valid certification **

## 2020-09-29 NOTE — PROGRESS NOTES
PT DAILY TREATMENT NOTE 10-18    Patient Name: Gracia Hunt  Date:2020  : 1965  [x]  Patient  Verified  Payor: Madina Dupont / Plan: [employer] [employer] / Product Type: Managed Care Medicaid /    In time:8:50  Out time:9:23  Total Treatment Time (min): 33  Visit #: 7 of 8    Treatment Area: Pain in right shoulder [M25.511]    SUBJECTIVE  Pain Level (0-10 scale): 8  Any medication changes, allergies to medications, adverse drug reactions, diagnosis change, or new procedure performed?: [x] No    [] Yes (see summary sheet for update)  Subjective functional status/changes:   [] No changes reported  Pt reports shoulder has been really sore after she opened a jar with right hand yesterday    OBJECTIVE    13 min Therapeutic Exercise:  [x] See flow sheet :   Rationale: increase ROM and increase strength to improve the patients ability to perform ADLs    10 min Therapeutic Activity:  [x]  See flow sheet :   Rationale: increase ROM and increase strength  to improve the patients ability to increase ease with daily tasks     10 min Manual Therapy:  STM/DTM to right UT and medial border of scap, scap mobs, post/inf grade 2-3 Gh jt mobs with PROM   Rationale: decrease pain, increase ROM and increase tissue extensibility to improve functional mobility            With   [] TE   [] TA   [] neuro   [] other: Patient Education: [x] Review HEP    [] Progressed/Changed HEP based on:   [] positioning   [] body mechanics   [] transfers   [] heat/ice application    [] other:      Other Objective/Functional Measures:   incr'd pain with wand flex     Pain Level (0-10 scale) post treatment: 7    ASSESSMENT/Changes in Function: Pt c/o icnr'd shoulder soreness throughout treatment today after using right arm to open a jar yesterday. Maintained good PROM even with soreness. Min decr'd pain following treatment today.      Patient will continue to benefit from skilled PT services to modify and progress therapeutic interventions, address functional mobility deficits, address ROM deficits, address strength deficits, analyze and address soft tissue restrictions, analyze and cue movement patterns, analyze and modify body mechanics/ergonomics and assess and modify postural abnormalities to attain remaining goals. []  See Plan of Care  []  See progress note/recertification  []  See Discharge Summary         Progress towards goals / Updated goals:  Short Term Goals: To be accomplished in 1 weeks:  1. Therapist to establish HEP for strength and ROM to improve ease with ADLs.             MET   Long Term Goals: To be accomplished in 4 weeks:  1. Patient will be independent with HEP to improve carryover of functional gains with ADLs between visits.             Eval Status:n/a              PROGRESSING; reports daily compliance, will update as we progress with protocol. 2. Pt will increase right passive shoulder flexion/ER to 140/ 50 degrees to increase ease with ADLs.             Eval Status:              Shoulder flexion PROM supine: 90 deg              Shoulder PROM ER in scaption: 10 deg              PROGRESSING; 143 deg flexion PROM, 30 deg ER PROM  3.  Pt will increase right active shoulder flexion to 90 degrees to increase ease with ADLs.             Eval Status:will address as cleared by surgeon/protocol              MET; 100 deg  4. Pt will increase FOTO score to 59 points to demonstrate improved functional lift & carry.               Eval Status:FOTO: 32              PROGRESSING; 48     Functional Gains:  strength, bathing/showering, dressing  Functional Deficits: tenderness to touch at anterior shoulder, reaching overhead, lifting, reaching behind self, reaching to pull covers over self  % improvement: 25%  Pain   Average: 7-8/10                  Best: 5/10                Worst: 10/10  Patient Goal: \"everything I'm supposed to be doing and lift stuff too. \"    PLAN  []  Upgrade activities as tolerated [x]  Continue plan of care  []  Update interventions per flow sheet       []  Discharge due to:_  []  Other:_      Naren Flores, PTA 9/29/2020  8:52 AM    Future Appointments   Date Time Provider Laurie Darden   9/29/2020 11:15 AM Oc Galvan MD AM BS AMB   10/1/2020  8:45 AM Janae Squire, PT [facility]   10/6/2020  8:15 AM [facility]ins[facility]   10/8/2020  8:15 AM Jazz Roe, PTA [facility]   10/13/2020  8:15 AM Janae Squire, PT [facility]   10/15/2020  8:15 AM Janae Squire, PT [facility]   10/27/2020  9:15 AM MITRA Dangelo [facility] BS AMB

## 2020-09-29 NOTE — PROGRESS NOTES
[facility]    CC: Persistent Asthma    HPI:     Vicente Duval, who was evaluated through a synchronous (real-time) audio-video encounter, and/or her healthcare decision maker, is aware that it is a billable service, with coverage as determined by her insurance carrier. She provided verbal consent to proceed: Yes, and patient identification was verified. It was conducted pursuant to the emergency declaration under the [address] [place] [address], [address] [place] [address] and the [employer] Act. A caregiver was present when appropriate. Ability to conduct physical exam was limited. I was at home. The patient was at home. Persistent Asthma:  -Taking montelukast as prescribed  -Denies any side effects or issues with the medication  -States she had two asthma attacks since she was last seen      ROS: Positive items marked in RED  CON: fever, chills  Cardiovascular: palpitations, CP  Resp: SOB, cough  GI: nausea, vomiting, diarrhea  : dysuria, hematuria      Past Medical History:   Diagnosis Date    Allergic rhinitis     Anxiety and depression     Asthma 11/3/2010    Chronic pain in right shoulder     Depression 10/19/2015    Diabetes ([address]     Eczema 10/19/2015    Head pain 4/28/2014    HTN (hypertension)     Multiple environmental allergies     Nicotine dependence     Syphilis in female 3/2/2015       Past Surgical History:   Procedure Laterality Date    ENDOSCOPY, COLON, DIAGNOSTIC      HX TUBAL LIGATION Bilateral        Family History   Problem Relation Age of Onset    Breast Cancer Maternal Grandmother     Cancer Mother        Social History     Tobacco Use    Smoking status: Current Every Day Smoker     Packs/day: 0.50     Types: Cigarettes    Smokeless tobacco: Never Used    Tobacco comment: quit in january, smokes one occ   Substance Use Topics    Alcohol use:  Yes     Alcohol/week: 2.0 standard drinks     Types: 2 Shots of liquor per week    Drug use: Yes     Types: Cocaine       Allergies   Allergen Reactions    Aspirin Nausea and Vomiting    Lisinopril Cough    Prednisone Swelling         Current Outpatient Medications:     fluticasone propionate (FLONASE) 50 mcg/actuation nasal spray, SPRAY 2 SPRAYS INTO EACH NOSTRIL EVERY DAY, Disp: 1 Bottle, Rfl: 4    metFORMIN ER (GLUCOPHAGE XR) 500 mg tablet, TAKE 1 TAB BY MOUTH DAILY (WITH DINNER). , Disp: 30 Tab, Rfl: 1    hydroCHLOROthiazide (HYDRODIURIL) 25 mg tablet, TAKE 1 TABLET BY MOUTH EVERY DAY, Disp: 30 Tab, Rfl: 1    losartan (COZAAR) 25 mg tablet, TAKE 1 TABLET BY MOUTH EVERY DAY, Disp: 90 Tab, Rfl: 1    montelukast (SINGULAIR) 10 mg tablet, Take 1 Tab by mouth daily. , Disp: 90 Tab, Rfl: 1    esomeprazole (NexIUM) 20 mg capsule, Take 20 mg by mouth as needed for Gastroesophageal Reflux Disease (GERD). , Disp: , Rfl:     pravastatin (PRAVACHOL) 40 mg tablet, TAKE 1 TABLET BY MOUTH EVERY DAY AT NIGHT, Disp: 90 Tab, Rfl: 0    traZODone (DESYREL) 100 mg tablet, Take 1 Tab by mouth nightly., Disp: 90 Tab, Rfl: 1    buPROPion SR (WELLBUTRIN SR) 150 mg SR tablet, Take 1 Tab by mouth two (2) times a day., Disp: 180 Tab, Rfl: 2    ketoconazole (NIZORAL) 2 % topical cream, Apply once daily to cover the affected and immediate surrounding area for 2 weeks, Disp: 30 g, Rfl: 2    albuterol (PROVENTIL HFA, VENTOLIN HFA, PROAIR HFA) 90 mcg/actuation inhaler, Take 2 Puffs by inhalation every six (6) hours as needed for Wheezing., Disp: 3 Inhaler, Rfl: 2    famotidine (PEPCID) 20 mg tablet, TAKE 1 TABLET BY MOUTH TWICE A DAY, Disp: 60 Tab, Rfl: 2    Physical Exam:      General: obese habitus, NAD, conversant  Eyes: sclera clear bilaterally, no discharge noted, eyelids normal in appearance, EOMI  HENT: NCAT  Neck: no masses visualized, trachea appears to be midline  Lungs: normal respiratory effort and rate, No visualized signs of difficulty breathing or respiratory distress  MS: normal AROM of neck noted  Skin: no significant exanthematous lesions or discoloration noted on neck/facial skin    Psych: alert and oriented to person, place and situation, normal affect  Neuro: speech normal, moving all upper extremities, no gaze palsy, no facial asymmetry (Cranial nerve 7 motor function) (limited exam due to video visit)      Assessment/Plan     Persistent Asthma, inadequately controlled:  -Started on Pulmicort regimen  -Will continue current montelukast regimen  -Follow-up in 1 month      Maida Whittaker is a 48 y.o. female being evaluated by a Virtual Visit (video visit) encounter to address concerns as mentioned above. A caregiver was present when appropriate. Due to this being a TeleHealth encounter (During [facility] ([place], [place])- public health emergency), evaluation of the following organ systems was limited: Vitals/Constitutional/EENT/Resp/CV/GI//MS/Neuro/Skin/Heme-Lymph-Imm. Pursuant to the emergency declaration under the [address] [place] [address] authority and the [employer] Act, this Virtual Visit was conducted with patient's (and/or legal guardian's) consent, to reduce the risk of exposure to COVID-19 and provide necessary medical care. Services were provided through a video synchronous discussion virtually to substitute for in-person encounter. --Clinton De Santiago MD on 9/29/2020 at 11:00 AM    An electronic signature was used to authenticate this note.

## 2020-09-30 DIAGNOSIS — E78.5 HYPERLIPIDEMIA, UNSPECIFIED HYPERLIPIDEMIA TYPE: ICD-10-CM

## 2020-10-01 ENCOUNTER — APPOINTMENT (OUTPATIENT)
Dept: PHYSICAL THERAPY | Age: 55
End: 2020-10-01
Payer: MEDICAID

## 2020-10-02 RX ORDER — PRAVASTATIN SODIUM 40 MG/1
TABLET ORAL
Qty: 90 TAB | Refills: 0 | Status: SHIPPED | OUTPATIENT
Start: 2020-10-02 | End: 2021-02-19 | Stop reason: SDUPTHER

## 2020-10-06 ENCOUNTER — HOSPITAL ENCOUNTER (OUTPATIENT)
Dept: PHYSICAL THERAPY | Age: 55
Discharge: HOME | End: 2020-10-06
Payer: MEDICAID

## 2020-10-06 PROCEDURE — 97110 THERAPEUTIC EXERCISES: CPT

## 2020-10-06 PROCEDURE — 97112 NEUROMUSCULAR REEDUCATION: CPT

## 2020-10-06 PROCEDURE — 97140 MANUAL THERAPY 1/> REGIONS: CPT

## 2020-10-06 PROCEDURE — 97530 THERAPEUTIC ACTIVITIES: CPT

## 2020-10-06 NOTE — PROGRESS NOTES
PT DAILY TREATMENT NOTE 10-18    Patient Name: Darvin Howard  Date:10/6/2020  : 1965  [x]  Patient  Verified  Payor: Mahsa Perez / [facility] / Product Type: Managed Care Medicaid /    In time:8:15  Out time:9:00  Total Treatment Time (min): 45  Visit #: 1 of 8    Treatment Area: Pain in right shoulder [M25.511]    SUBJECTIVE  Pain Level (0-10 scale): 8  Any medication changes, allergies to medications, adverse drug reactions, diagnosis change, or new procedure performed?: [x] No    [] Yes (see summary sheet for update)  Subjective functional status/changes:   [] No changes reported  Pt states neck still feels tight    OBJECTIVE    25 min Therapeutic Exercise:  [x] See flow sheet :  With pt edu on posture and ergonomics:  STM/DTM to right UT and medial border of scap, scap mobs, post/inf grade 2-3 Gh jt mobs with PROM   Rationale: increase ROM and increase strength to improve the patients ability to perform ADLs    10 min Therapeutic Activity:  [x]  See flow sheet :   Rationale: increase strength, improve coordination and increase proprioception  to improve the patients ability to increase ease with daily activities      10 min Neuromuscular Re-education:  [x]? See flow sheet :scap re-ed   Rationale: increase ROM, increase strength, improve coordination, improve balance and increase proprioception  to increase ease with functional tasks             With   [] TE   [] TA   [] neuro   [] other: Patient Education: [x] Review HEP    [] Progressed/Changed HEP based on:   [] positioning   [] body mechanics   [] transfers   [] heat/ice application    [] other:      Other Objective/Functional Measures:   Improved tolerance to supine wand  Uses left hand to support right with wall wipe     Pain Level (0-10 scale) post treatment: 5-6    ASSESSMENT/Changes in Function: Pt demo'd improved tolerance to AAROM flex with wand today. Cont's with c/o neck pain and ms tension.  Decr'd pain following treatment today. Patient will continue to benefit from skilled PT services to modify and progress therapeutic interventions, address functional mobility deficits, address ROM deficits, address strength deficits, analyze and address soft tissue restrictions, analyze and cue movement patterns, analyze and modify body mechanics/ergonomics and assess and modify postural abnormalities to attain remaining goals. []  See Plan of Care  []  See progress note/recertification  []  See Discharge Summary         Progress towards goals / Updated goals:  1. Patient will be independent with HEP to improve carryover of functional gains with ADLs between visits.             PN status; reports daily compliance, will update as we progress with protocol. 2. Pt will increase right passive shoulder flexion/ER to 140/ 50 degrees to increase ease with ADLs.             PN status;  143 deg flexion PROM, 30 deg ER PROM  3.  Pt will increase right active shoulder flexion to 160 degrees to increase ease with ADLs.             PN status;  100 deg  4.  Pt will increase FOTO score to 59 points to demonstrate improved functional lift & carry.               PN status;  48    PLAN  []  Upgrade activities as tolerated     [x]  Continue plan of care  []  Update interventions per flow sheet       []  Discharge due to:_  []  Other:_      Jacqueline Kumar PTA 10/6/2020  8:09 AM    Future Appointments   Date Time Provider Laurie Darden   10/6/2020  8:15 [facility]   10/8/2020  8:15 AM [facility]   10/13/2020  8:15 AM [facility]   10/15/2020  8:15 AM [facility]   10/27/2020  9:15 AM MITRA Hooker [facility] BS AMB

## 2020-10-08 ENCOUNTER — APPOINTMENT (OUTPATIENT)
Dept: PHYSICAL THERAPY | Age: 55
End: 2020-10-08
Payer: MEDICAID

## 2020-10-13 ENCOUNTER — HOSPITAL ENCOUNTER (OUTPATIENT)
Dept: PHYSICAL THERAPY | Age: 55
Discharge: HOME | End: 2020-10-13
Payer: MEDICAID

## 2020-10-13 PROCEDURE — 97110 THERAPEUTIC EXERCISES: CPT

## 2020-10-13 PROCEDURE — 97112 NEUROMUSCULAR REEDUCATION: CPT

## 2020-10-13 PROCEDURE — 97530 THERAPEUTIC ACTIVITIES: CPT

## 2020-10-13 NOTE — PROGRESS NOTES
PT DAILY TREATMENT NOTE 10-18    Patient Name: Vicente Duval  Date:10/13/2020  : 1965  [x]  Patient  Verified  Payor: Sherif Kraft / [facility] / Product Type: Managed Care Medicaid /    In time:815  Out time:902  Total Treatment Time (min): 47  Visit #: 2 of 8      Treatment Area: Pain in right shoulder [M25.511]    SUBJECTIVE  Pain Level (0-10 scale): 5  Any medication changes, allergies to medications, adverse drug reactions, diagnosis change, or new procedure performed?: [x] No    [] Yes (see summary sheet for update)  Subjective functional status/changes:   [] No changes reported  \"I'm doing ok today. \"    OBJECTIVE    27 min Therapeutic Exercise:  [x]? See flow sheet :   Rationale: increase ROM and increase strength to improve the patients ability to perform ADLs     10 min Therapeutic Activity:  [x]? See flow sheet :   Rationale: increase strength, improve coordination and increase proprioception  to improve the patients ability to increase ease with daily activities     10 min Neuromuscular Re-education:  [x]  See flow sheet :scap re-ed   Rationale: increase ROM, increase strength, improve coordination, improve balance and increase proprioception  to improve the patients ability to reach/lift. With   [] TE   [] TA   [] neuro   [] other: Patient Education: [x] Review HEP    [] Progressed/Changed HEP based on:   [] positioning   [] body mechanics   [] transfers   [] heat/ice application    [] other:      Other Objective/Functional Measures: 152 deg PROM flexion     Pain Level (0-10 scale) post treatment: 5    ASSESSMENT/Changes in Function: patient having some intermittent clunking with reaching that she reports is not painful but is sore at times. Advised to stay out of all painful ranges of activities. emphasized performing HEP more consistently at home for ROM carryover between sessions.      Patient will continue to benefit from skilled PT services to modify and progress therapeutic interventions, address functional mobility deficits, address ROM deficits, address strength deficits, analyze and address soft tissue restrictions, analyze and cue movement patterns, analyze and modify body mechanics/ergonomics, assess and modify postural abnormalities, address imbalance/dizziness and instruct in home and community integration to attain remaining goals. []  See Plan of Care  []  See progress note/recertification  []  See Discharge Summary         Progress towards goals / Updated goals:  1. Patient will be independent with HEP to improve carryover of functional gains with ADLs between visits.             PN status; reports daily compliance, will update as we progress with protocol. 2. Pt will increase right passive shoulder flexion/ER to 140/ 50 degrees to increase ease with ADLs.             PN status;  143 deg flexion PROM, 30 deg ER PROM  3.  Pt will increase right active shoulder flexion to 160 degrees to increase ease with ADLs.             PN status;  100 deg  4.  Pt will increase FOTO score to 59 points to demonstrate improved functional lift & carry.               PN status;  48       PLAN  []  Upgrade activities as tolerated     []  Continue plan of care  []  Update interventions per flow sheet       []  Discharge due to:_  []  Other:_      Julian Fonseca, PT 10/13/2020  8:24 AM    Future Appointments   Date Time Provider Laurie Katalina   10/15/2020  8:15 AM [facility]   10/20/2020  8:15 AM [facility]e [facility]quire, PT [facility]   10/20/2020 11:45 AM Oc Galvan MD AM BS AMB   10/22/2020  8:15 AM Valentina Coyle, PT [facility]   10/27/2020  8:15 AM Janae Squire, PT [facility]   10/27/2020  9:15 AM MITRA Dangelo [facility] BS AMB   10/29/2020  8:15 AM Mirta Walter, PT [facility]

## 2020-10-15 ENCOUNTER — HOSPITAL ENCOUNTER (OUTPATIENT)
Dept: PHYSICAL THERAPY | Age: 55
Discharge: HOME | End: 2020-10-15
Payer: MEDICAID

## 2020-10-15 PROCEDURE — 97110 THERAPEUTIC EXERCISES: CPT

## 2020-10-15 PROCEDURE — 97530 THERAPEUTIC ACTIVITIES: CPT

## 2020-10-15 PROCEDURE — 97112 NEUROMUSCULAR REEDUCATION: CPT

## 2020-10-15 NOTE — PROGRESS NOTES
PT DAILY TREATMENT NOTE 10-18    Patient Name: Veleta Severe  Date:10/15/2020  : 1965  [x]  Patient  Verified  Payor: Lan Rodriguez / [facility] / Product Type: Managed Care Medicaid /    In time:815  Out time:900  Total Treatment Time (min): 45  Visit #: 3 of 8    Treatment Area: Pain in right shoulder [M25.511]    SUBJECTIVE  Pain Level (0-10 scale): 5  Any medication changes, allergies to medications, adverse drug reactions, diagnosis change, or new procedure performed?: [x] No    [] Yes (see summary sheet for update)  Subjective functional status/changes:   [] No changes reported  \"sore. \"    OBJECTIVE       25 min Therapeutic Exercise:  [x]? ? See flow sheet :   Rationale: increase ROM and increase strength to improve the patients ability to perform ADLs     10 min Therapeutic Activity:  [x]? ?  See flow sheet :   Rationale: increase strength, improve coordination and increase proprioception  to improve the patients ability to increase ease with daily activities      10 min Neuromuscular Re-education:  [x]? See flow sheet :scap re-ed   Rationale: increase ROM, increase strength, improve coordination, improve balance and increase proprioception  to improve the patients ability to reach/lift.                    With   [] TE   [] TA   [] neuro   [] other: Patient Education: [x] Review HEP    [] Progressed/Changed HEP based on:   [] positioning   [] body mechanics   [] transfers   [] heat/ice application    [] other:      Other Objective/Functional Measures:      Pain Level (0-10 scale) post treatment: 6    ASSESSMENT/Changes in Function: progressed well with reaching and light resistance. soreness remains but active motion improving. Continued to stress painfree movement at all times.     Patient will continue to benefit from skilled PT services to modify and progress therapeutic interventions, address functional mobility deficits, address ROM deficits, address strength deficits, analyze and address soft tissue restrictions, analyze and cue movement patterns, analyze and modify body mechanics/ergonomics, assess and modify postural abnormalities, address imbalance/dizziness and instruct in home and community integration to attain remaining goals. []  See Plan of Care  []  See progress note/recertification  []  See Discharge Summary         Progress towards goals / Updated goals:  1. Patient will be independent with HEP to improve carryover of functional gains with ADLs between visits.             PN status; reports daily compliance, will update as we progress with protocol. 2. Pt will increase right passive shoulder flexion/ER to 140/ 50 degrees to increase ease with ADLs.             PN status;  143 deg flexion PROM, 30 deg ER PROM  3.  Pt will increase right active shoulder flexion to 160 degrees to increase ease with ADLs.             PN status;  100 deg  4.  Pt will increase FOTO score to 59 points to demonstrate improved functional lift & carry.               PN status;  48       PLAN  [x]  Upgrade activities as tolerated     []  Continue plan of care  []  Update interventions per flow sheet       []  Discharge due to:_  []  Other:_      Caleb Arce, PT 10/15/2020  8:22 AM    Future Appointments   Date Time Provider Laurie Darden   10/20/2020  8:15 AM [facility]   10/20/2020 11:45 AM Cleo Castro MD AM BS AMB   10/22/2020  8:15 AM Debbie Marie, [facility]   10/27/2020  8:15 AM Olivia Case, [facility]   10/27/2020  9:15 AM MITRA [facility] BS AMB   10/29/2020  8:15 AM Isaías Pires, [facility]

## 2020-10-20 ENCOUNTER — HOSPITAL ENCOUNTER (OUTPATIENT)
Dept: PHYSICAL THERAPY | Age: 55
Discharge: HOME | End: 2020-10-20
Payer: MEDICAID

## 2020-10-20 PROCEDURE — 97112 NEUROMUSCULAR REEDUCATION: CPT

## 2020-10-20 PROCEDURE — 97530 THERAPEUTIC ACTIVITIES: CPT

## 2020-10-20 PROCEDURE — 97110 THERAPEUTIC EXERCISES: CPT

## 2020-10-20 NOTE — PROGRESS NOTES
PT DAILY TREATMENT NOTE 10-18    Patient Name: Veleta Severe  Date:10/20/2020  : 1965  [x]  Patient  Verified  Payor: Lan Rodriguez / [facility]: [facility] / Product Type: Managed Care Medicaid /    In time:815  Out time:849  Total Treatment Time (min): 34  Visit #: 4 of 8    Treatment Area: Pain in right shoulder [M25.511]    SUBJECTIVE  Pain Level (0-10 scale): 5  Any medication changes, allergies to medications, adverse drug reactions, diagnosis change, or new procedure performed?: [x] No    [] Yes (see summary sheet for update)  Subjective functional status/changes:   [] No changes reported  \"sore but doing ok. \"    OBJECTIVE       14 min Therapeutic Exercise:  [x]? ?? See flow sheet :   Rationale: increase ROM and increase strength to improve the patients ability to perform ADLs     10 min Therapeutic Activity:  [x]? ??  See flow sheet :   Rationale: increase strength, improve coordination and increase proprioception  to improve the patients ability to increase ease with daily activities      10 min Neuromuscular Re-education:  [x]? ?  See flow sheet :scap re-ed   Rationale: increase ROM, increase strength, improve coordination, improve balance and increase proprioception  to improve the patients ability to reach/lift.              With   [] TE   [] TA   [] neuro   [] other: Patient Education: [x] Review HEP    [] Progressed/Changed HEP based on:   [] positioning   [] body mechanics   [] transfers   [] heat/ice application    [] other:      Other Objective/Functional Measures:  140 deg flexion AROM right shoulder    Pain Level (0-10 scale) post treatment:3-4    ASSESSMENT/Changes in Function:  progressing with overhead AROM but not able to do any weight overhead just yet. Still some soreness and c/o night pain.      Patient will continue to benefit from skilled PT services to modify and progress therapeutic interventions, address functional mobility deficits, address ROM deficits, address strength deficits, analyze and address soft tissue restrictions, analyze and cue movement patterns, analyze and modify body mechanics/ergonomics, assess and modify postural abnormalities, address imbalance/dizziness and instruct in home and community integration to attain remaining goals. []  See Plan of Care  []  See progress note/recertification  []  See Discharge Summary         Progress towards goals / Updated goals:  1. Patient will be independent with HEP to improve carryover of functional gains with ADLs between visits.             PN status; reports daily compliance, will update as we progress with protocol. 2. Pt will increase right passive shoulder flexion/ER to 140/ 50 degrees to increase ease with ADLs.             PN status;  143 deg flexion PROM, 30 deg ER PROM  3.  Pt will increase right active shoulder flexion to 160 degrees to increase ease with ADLs.             PN status;  100 deg  4.  Pt will increase FOTO score to 59 points to demonstrate improved functional lift & carry.               PN status;  48    PLAN  []  Upgrade activities as tolerated     [x]  Continue plan of care  []  Update interventions per flow sheet       []  Discharge due to:_  []  Other:_      Caleb Arce PT 10/20/2020  8:24 AM    Future Appointments   Date Time Provider Laurie Darden   10/21/2020 11:15 AM Cleo Castro MD [facility] BS AMB   10/22/2020  8:15 AM Olivia Civatte, PT [facility]   10/27/2020  8:15 AM Olivia Civatte, PT [facility]   10/27/2020  9:15 AM MITRA [facility] BS AMB   10/29/2020  8:15 AM Olivia Civatte, PT [facility]

## 2020-10-22 ENCOUNTER — APPOINTMENT (OUTPATIENT)
Dept: PHYSICAL THERAPY | Age: 55
End: 2020-10-22
Payer: MEDICAID

## 2020-10-27 ENCOUNTER — HOSPITAL ENCOUNTER (OUTPATIENT)
Dept: PHYSICAL THERAPY | Age: 55
Discharge: HOME | End: 2020-10-27
Payer: MEDICAID

## 2020-10-27 ENCOUNTER — OFFICE VISIT (OUTPATIENT)
Dept: ORTHOPEDIC SURGERY | Age: 55
End: 2020-10-27
Payer: MEDICAID

## 2020-10-27 VITALS
OXYGEN SATURATION: 97 % | HEART RATE: 88 BPM | HEIGHT: 64 IN | SYSTOLIC BLOOD PRESSURE: 133 MMHG | DIASTOLIC BLOOD PRESSURE: 84 MMHG | BODY MASS INDEX: 40.8 KG/M2 | TEMPERATURE: 96.9 F | RESPIRATION RATE: 16 BRPM | WEIGHT: 239 LBS

## 2020-10-27 DIAGNOSIS — S46.011A TRAUMATIC COMPLETE TEAR OF RIGHT ROTATOR CUFF, INITIAL ENCOUNTER: Primary | ICD-10-CM

## 2020-10-27 DIAGNOSIS — Z98.890 STATUS POST ARTHROSCOPY OF RIGHT SHOULDER: ICD-10-CM

## 2020-10-27 PROCEDURE — 99024 POSTOP FOLLOW-UP VISIT: CPT | Performed by: ORTHOPAEDIC SURGERY

## 2020-10-27 PROCEDURE — 97110 THERAPEUTIC EXERCISES: CPT

## 2020-10-27 PROCEDURE — 97112 NEUROMUSCULAR REEDUCATION: CPT

## 2020-10-27 PROCEDURE — 97530 THERAPEUTIC ACTIVITIES: CPT

## 2020-10-27 NOTE — PROGRESS NOTES
PT DAILY TREATMENT NOTE 10-18    Patient Name: Mortimer Polio  Date:10/27/2020  : 1965  [x]  Patient  Verified  Payor: Lakesha Taylor / Plan: [place] [facility] [employer] / Product Type: Managed Care Medicaid /    In time:8:15  Out time:9:05  Total Treatment Time (min): 50  Visit #: 5 of 8    Treatment Area: Pain in right shoulder [M25.511]    SUBJECTIVE  Pain Level (0-10 scale): 0  Any medication changes, allergies to medications, adverse drug reactions, diagnosis change, or new procedure performed?: [x] No    [] Yes (see summary sheet for update)  Subjective functional status/changes:   [] No changes reported  Pt states she still can't raise her arm with any weight but it's feeling better    OBJECTIVE    30 min Therapeutic Exercise:  [x] See flow sheet :   Rationale: increase ROM and increase strength to improve the patients ability to perform ADLs    10 min Therapeutic Activity:  [x]  See flow sheet :   Rationale: increase strength, improve coordination and increase proprioception  to improve the patients ability to increase ease with daily activities     10 min Neuromuscular Re-education:  [x]  See flow sheet :   Rationale: increase strength, improve coordination and increase proprioception  to improve the patients ability to perform functional tasks            With   [] TE   [] TA   [] neuro   [] other: Patient Education: [x] Review HEP    [] Progressed/Changed HEP based on:   [] positioning   [] body mechanics   [] transfers   [] heat/ice application    [] other:      Other Objective/Functional Measures: Added 2# to cone reach  Added IR str with strap      Pain Level (0-10 scale) post treatment: 3    ASSESSMENT/Changes in Function: Added weight to cone reach was good challenge, demo's ms fatigue but no c/o incr'd pain. Notes pain at end rand AROM flex and occasional pain free \"popping\". Min vc's to avoid shoulder hike throughout treatment but pt has fairly good awareness.      Patient will continue to benefit from skilled PT services to modify and progress therapeutic interventions, address functional mobility deficits, address ROM deficits, address strength deficits, analyze and address soft tissue restrictions, analyze and cue movement patterns, analyze and modify body mechanics/ergonomics and assess and modify postural abnormalities to attain remaining goals. []  See Plan of Care  []  See progress note/recertification  []  See Discharge Summary         Progress towards goals / Updated goals:  1. Patient will be independent with HEP to improve carryover of functional gains with ADLs between visits.             PN status; reports daily compliance, will update as we progress with protocol. 2. Pt will increase right passive shoulder flexion/ER to 140/ 50 degrees to increase ease with ADLs.             PN status;  143 deg flexion PROM, 30 deg ER PROM  3.  Pt will increase right active shoulder flexion to 160 degrees to increase ease with ADLs.             PN status;  100 deg   Current: right shoulder AROM flex 140*  4.  Pt will increase FOTO score to 59 points to demonstrate improved functional lift & carry.               PN status;  48    PLAN  []  Upgrade activities as tolerated     [x]  Continue plan of care  []  Update interventions per flow sheet       []  Discharge due to:_  []  Other:_      Gearld Deana, PTA 10/27/2020  8:22 AM    Future Appointments   Date Time Provider Laurie Darden   10/27/2020  9:15 AM MITRA Herman [facility] BS AMB   10/29/2020  8:15 [facility]   10/30/2020  8:30 AM Jag Forrest MD [facility] BS AMB

## 2020-10-27 NOTE — PROGRESS NOTES
Lyudmila Escobedo Micky  1965     HISTORY OF PRESENT ILLNESS  Sergio Eli is a 48 y.o. female who presents today for evaluation S/P Right shoulder arthroscopic rotator cuff repair, biceps tenotomy and glenohumeral debridment on 7/13/20. Patient has been going to PT. Describes pain as a 5/10. Has been taking nothing for pain. Still has night pain. She has been compliant with her exercises and restrictions. She has occasional achy pain when she is twisting the tie to close the bread or use a can opener. She has pain that makes it difficult to do these things. The pain is located in the biceps area. Patient denies any fever, chills, chest pain, shortness of breath or calf pain. There are no new illness or injuries to report since last seen in the office. PHYSICAL EXAM:   Visit Vitals  /84 (BP 1 Location: Right arm, BP Patient Position: Sitting)   Pulse 88   Temp 96.9 °F (36.1 °C)   Resp 16   Ht 5' 4\" (1.626 m)   Wt 239 lb (108.4 kg)   SpO2 97%   BMI 41.02 kg/m²      The patient is a well-developed, well-nourished female in no acute distress. The patient is alert and oriented times three. The patient appears to be well groomed. Mood and affect are normal.  ORTHOPEDIC EXAM of right shoulder:  Inspection: swelling not present,  Bruising not present  Incision well healed  Passive glenohumeral abduction 0-90, 180 PFF, 180 AFF, 60 PER  Stability: Stable  Strength: n/a  2+ distal pulses    IMPRESSION:  S/P Right shoulder arthroscopic rotator cuff repair, biceps tenotomy and glenohumeral debridment    PLAN:   Pt doing well post operatively  She has made progress with her motion since last visit. Her motion looks great today. The pain she has with using a can opener is likely scar tissue from the biceps tenotomy. Will continue to watch and work on this. This should get better with time.   Will continue PT and exercises to work on ROM and start gentle strengthening  She can take OTC motrin to help with discomfort  Stressed to patient that nothing causes an increase in pain. Pt not given a refill of pain medication today.    RTC 6 weeks    MIREYA Smith [facility]

## 2020-10-29 ENCOUNTER — HOSPITAL ENCOUNTER (OUTPATIENT)
Dept: PHYSICAL THERAPY | Age: 55
Discharge: HOME | End: 2020-10-29
Payer: MEDICAID

## 2020-10-29 PROCEDURE — 97112 NEUROMUSCULAR REEDUCATION: CPT

## 2020-10-29 PROCEDURE — 97530 THERAPEUTIC ACTIVITIES: CPT

## 2020-10-29 PROCEDURE — 97110 THERAPEUTIC EXERCISES: CPT

## 2020-10-29 NOTE — PROGRESS NOTES
PT DAILY TREATMENT NOTE 10-18    Patient Name: Maida Whittaker  Date:10/29/2020  : 1965  [x]  Patient  Verified  Payor: Cinthya Santiago / Plan: [facility] [employer] [employer] / Product Type: Managed Care Medicaid /    In time:815  Out time:902  Total Treatment Time (min): 47  Visit #: 6 of 8    Treatment Area: Pain in right shoulder [M25.511]    SUBJECTIVE  Pain Level (0-10 scale): 2  Any medication changes, allergies to medications, adverse drug reactions, diagnosis change, or new procedure performed?: [x] No    [] Yes (see summary sheet for update)  Subjective functional status/changes:   [] No changes reported  \"I'm doing good today, not bad. \"    OBJECTIVE       27 min Therapeutic Exercise:  [x]? See flow sheet :   Rationale: increase ROM and increase strength to improve the patients ability to perform ADLs     10 min Therapeutic Activity:  [x]? See flow sheet :functional reaching   Rationale: increase strength, improve coordination and increase proprioception  to improve the patients ability to increase ease with daily activities     10 min Neuromuscular Re-education:  [x]? See flow sheet :scap re-ed   Rationale: increase strength, improve coordination and increase proprioception  to improve the patients ability to perform functional tasks            With   [] TE   [] TA   [] neuro   [] other: Patient Education: [x] Review HEP    [] Progressed/Changed HEP based on:   [] positioning   [] body mechanics   [] transfers   [] heat/ice application    [] other:      Other Objective/Functional Measures:  AROM right shoulder flexion 150 deg    Pain Level (0-10 scale) post treatment: 2-3    ASSESSMENT/Changes in Function: progressed strengthening activities without increase in pain. Did well with closed chain additions per flow sheet. Advised patient to work on overhead reaching tasks at home with light weight to being working more on overhead stability.      Patient will continue to benefit from skilled PT services to modify and progress therapeutic interventions, address functional mobility deficits, address ROM deficits, address strength deficits, analyze and address soft tissue restrictions, analyze and cue movement patterns, analyze and modify body mechanics/ergonomics, assess and modify postural abnormalities, address imbalance/dizziness and instruct in home and community integration to attain remaining goals. []  See Plan of Care  []  See progress note/recertification  []  See Discharge Summary         Progress towards goals / Updated goals:  1. Patient will be independent with HEP to improve carryover of functional gains with ADLs between visits.             PN status; reports daily compliance, will update as we progress with protocol. 2. Pt will increase right passive shoulder flexion/ER to 140/ 50 degrees to increase ease with ADLs.             PN status;  143 deg flexion PROM, 30 deg ER PROM  3.  Pt will increase right active shoulder flexion to 160 degrees to increase ease with ADLs.             PN status;  100 deg              Current: right shoulder AROM flex 150 deg  4.  Pt will increase FOTO score to 59 points to demonstrate improved functional lift & carry.               PN status;  48       PLAN  [x]  Upgrade activities as tolerated     []  Continue plan of care  []  Update interventions per flow sheet       []  Discharge due to:_  []  Other:_      Jocelyne Alicia, PT 10/29/2020  8:21 AM    Future Appointments   Date Time Provider Laurie Aktalina   10/30/2020  8:30 AM Christelle Alex MD [facility] BS AMB   12/2/2020  1:45 PM MITRA Verma [facility] BS AMB

## 2020-10-30 ENCOUNTER — VIRTUAL VISIT (OUTPATIENT)
Dept: FAMILY MEDICINE CLINIC | Age: 55
End: 2020-10-30
Payer: MEDICAID

## 2020-10-30 DIAGNOSIS — F32.A ANXIETY AND DEPRESSION: ICD-10-CM

## 2020-10-30 DIAGNOSIS — G47.00 INSOMNIA, UNSPECIFIED TYPE: Primary | ICD-10-CM

## 2020-10-30 DIAGNOSIS — E11.9 TYPE 2 DIABETES MELLITUS WITHOUT COMPLICATION, WITHOUT LONG-TERM CURRENT USE OF INSULIN (HCC): ICD-10-CM

## 2020-10-30 DIAGNOSIS — I10 ESSENTIAL HYPERTENSION WITH GOAL BLOOD PRESSURE LESS THAN 130/80: ICD-10-CM

## 2020-10-30 DIAGNOSIS — F41.9 ANXIETY AND DEPRESSION: ICD-10-CM

## 2020-10-30 DIAGNOSIS — J45.909 PERSISTENT ASTHMA WITHOUT COMPLICATION, UNSPECIFIED ASTHMA SEVERITY: ICD-10-CM

## 2020-10-30 PROCEDURE — 99214 OFFICE O/P EST MOD 30 MIN: CPT | Performed by: FAMILY MEDICINE

## 2020-10-30 RX ORDER — HYDROCHLOROTHIAZIDE 25 MG/1
TABLET ORAL
Qty: 90 TAB | Refills: 1 | Status: SHIPPED | OUTPATIENT
Start: 2020-10-30 | End: 2021-08-04 | Stop reason: SDUPTHER

## 2020-10-30 RX ORDER — TRAZODONE HYDROCHLORIDE 150 MG/1
150 TABLET ORAL
Qty: 30 TAB | Refills: 2 | Status: SHIPPED | OUTPATIENT
Start: 2020-10-30 | End: 2021-01-28 | Stop reason: SDUPTHER

## 2020-10-30 RX ORDER — METFORMIN HYDROCHLORIDE 500 MG/1
500 TABLET, EXTENDED RELEASE ORAL
Qty: 90 TAB | Refills: 1 | Status: SHIPPED | OUTPATIENT
Start: 2020-10-30 | End: 2021-03-18 | Stop reason: SDUPTHER

## 2020-10-30 RX ORDER — HYDROCHLOROTHIAZIDE 25 MG/1
TABLET ORAL
Qty: 30 TAB | Refills: 1 | Status: SHIPPED | OUTPATIENT
Start: 2020-10-30 | End: 2020-10-30

## 2020-10-30 NOTE — PROGRESS NOTES
1. Have you been to the ER, urgent care clinic since your last visit? Hospitalized since your last visit? No    2. Have you seen or consulted any other health care providers outside of the [address] [place] [address] since your last visit? Include any pap smears or colon screening.  Yes follow up Orthopedic surgeon and Physical Therapy

## 2020-10-30 NOTE — PROGRESS NOTES
Leandro Jackson    CC: F/U of Asthma    HPI:     Rickie Dawkins, who was evaluated through a synchronous (real-time) audio-video encounter, and/or her healthcare decision maker, is aware that it is a billable service, with coverage as determined by her insurance carrier. She provided verbal consent to proceed: Yes, and patient identification was verified. It was conducted pursuant to the emergency declaration under the [address] [place] [address] and the [employer] General Act. A caregiver was present when appropriate. Ability to conduct physical exam was limited. I was in the office. The patient was at home. Asthma:  -Taking montelukast and Pulmicort as prescribed  -Denies any side effects or issues with the medications  -Reports she has had no issue with her asthma since      HTN:  -Taking BP medication as prescribed  -Denies any side effects or issue with her BP medication  -Not checking BP at home  -Recent BP at orthopedist's office on 10/27/2020 was 133/84.   BP was 113/78 at prior visit at orthopedist's office on 9/18/2020 (Reviewed in EMR)  -Reports she has gained weight since last visit  -Diet is unchanged  -Not following any regular exercise regimen      Insomnia:  -Taking trazodone as prescribed  -Denies any side effects or issues the medication  -Reports medication helps but has issue inadequately controlled      ROS: Positive items marked in RED  CON: fever, chills  Cardiovascular: palpitations, CP  Resp: SOB, cough  GI: nausea, vomiting, diarrhea  : dysuria, hematuria      Past Medical History:   Diagnosis Date    Allergic rhinitis     Anxiety and depression     Asthma 11/3/2010    Chronic pain in right shoulder     Depression 10/19/2015    Diabetes ([facility] [address]     Eczema 10/19/2015    Head pain 4/28/2014    HTN (hypertension)     Multiple environmental allergies     Nicotine dependence  Syphilis in female 3/2/2015       Past Surgical History:   Procedure Laterality Date    ENDOSCOPY, COLON, DIAGNOSTIC      HX TUBAL LIGATION Bilateral        Family History   Problem Relation Age of Onset    Breast Cancer Maternal Grandmother     Cancer Mother        Social History     Tobacco Use    Smoking status: Current Every Day Smoker     Packs/day: 0.50     Types: Cigarettes    Smokeless tobacco: Never Used    Tobacco comment: quit in january, smokes one occ   Substance Use Topics    Alcohol use: Yes     Alcohol/week: 2.0 standard drinks     Types: 2 Shots of liquor per week    Drug use: Yes     Types: Cocaine       Allergies   Allergen Reactions    Aspirin Nausea and Vomiting    Lisinopril Cough    Prednisone Swelling         Current Outpatient Medications:     pravastatin (PRAVACHOL) 40 mg tablet, TAKE 1 TABLET BY MOUTH EVERY DAY AT NIGHT, Disp: 90 Tab, Rfl: 0    budesonide (PULMICORT FLEXHALER) 90 mcg/actuation aepb inhaler, Take 2 Puffs by inhalation two (2) times a day., Disp: 1 Inhaler, Rfl: 5    fluticasone propionate (FLONASE) 50 mcg/actuation nasal spray, SPRAY 2 SPRAYS INTO EACH NOSTRIL EVERY DAY, Disp: 1 Bottle, Rfl: 4    metFORMIN ER (GLUCOPHAGE XR) 500 mg tablet, TAKE 1 TAB BY MOUTH DAILY (WITH DINNER). , Disp: 30 Tab, Rfl: 1    famotidine (PEPCID) 20 mg tablet, TAKE 1 TABLET BY MOUTH TWICE A DAY, Disp: 60 Tab, Rfl: 2    hydroCHLOROthiazide (HYDRODIURIL) 25 mg tablet, TAKE 1 TABLET BY MOUTH EVERY DAY, Disp: 30 Tab, Rfl: 1    losartan (COZAAR) 25 mg tablet, TAKE 1 TABLET BY MOUTH EVERY DAY, Disp: 90 Tab, Rfl: 1    montelukast (SINGULAIR) 10 mg tablet, Take 1 Tab by mouth daily. , Disp: 90 Tab, Rfl: 1    esomeprazole (NexIUM) 20 mg capsule, Take 20 mg by mouth as needed for Gastroesophageal Reflux Disease (GERD). , Disp: , Rfl:     traZODone (DESYREL) 100 mg tablet, Take 1 Tab by mouth nightly., Disp: 90 Tab, Rfl: 1    buPROPion SR (WELLBUTRIN SR) 150 mg SR tablet, Take 1 Tab by mouth two (2) times a day., Disp: 180 Tab, Rfl: 2    ketoconazole (NIZORAL) 2 % topical cream, Apply once daily to cover the affected and immediate surrounding area for 2 weeks, Disp: 30 g, Rfl: 2    albuterol (PROVENTIL HFA, VENTOLIN HFA, PROAIR HFA) 90 mcg/actuation inhaler, Take 2 Puffs by inhalation every six (6) hours as needed for Wheezing., Disp: 3 Inhaler, Rfl: 2    Physical Exam:      General: obese habitus, NAD, conversant  Eyes: sclera clear bilaterally, no discharge noted, eyelids normal in appearance, EOMI  HENT: NCAT  Neck: no masses visualized, trachea appears to be midline  Lungs: normal respiratory effort and rate, No visualized signs of difficulty breathing or respiratory distress  MS: normal AROM of neck noted  Skin: no significant exanthematous lesions or discoloration noted on neck/facial skin    Psych: alert and oriented to person, place and situation, normal affect  Neuro: speech normal, moving all upper extremities, no gaze palsy, no facial asymmetry (Cranial nerve 7 motor function) (limited exam due to video visit)      Assessment/Plan     Insomnia, inadequately controlled:  -Trazodone dose increased to 150 mg  -Follow-up in 1 month      Persistent Asthma:  -Appears to be possibly well controlled  -Will continue current Pulmicort and montelukast regimen  -Follow-up in 1 month      Hypertension:  -Ability to assess issue limited by nature of visit type  -Suspect recently elevated BP at orthopedist office was due to pain, as her previous blood pressures have been at goal at prior office visits  -Will continue current BP medication regimen  -Follow-up in 1 month      Chester Wood is a 48 y.o. female being evaluated by a Virtual Visit (video visit) encounter to address concerns as mentioned above. A caregiver was present when appropriate.  Due to this being a TeleHealth encounter (During [facility]- public health emergency), evaluation of the following organ systems was limited: Vitals/Constitutional/EENT/Resp/CV/GI//MS/Neuro/Skin/Heme-Lymph-Imm. Pursuant to the emergency declaration under the [address] [place] [address] authority and the [employer] and [employer] Act, this Virtual Visit was conducted with patient's (and/or legal guardian's) consent, to reduce the risk of exposure to COVID-19 and provide necessary medical care. Services were provided through a video synchronous discussion virtually to substitute for in-person encounter. --Laya Andres MD on 10/30/2020 at 8:20 AM    An electronic signature was used to authenticate this note.

## 2020-11-06 ENCOUNTER — HOSPITAL ENCOUNTER (OUTPATIENT)
Dept: PHYSICAL THERAPY | Age: 55
Discharge: HOME | End: 2020-11-06
Payer: MEDICAID

## 2020-11-06 PROCEDURE — 97110 THERAPEUTIC EXERCISES: CPT

## 2020-11-06 PROCEDURE — 97530 THERAPEUTIC ACTIVITIES: CPT

## 2020-11-06 PROCEDURE — 97112 NEUROMUSCULAR REEDUCATION: CPT

## 2020-11-06 NOTE — PROGRESS NOTES
[facility] [address] [facility] [address], [address] [phone] fax     Physical Therapy Progress Note      Patient name: Dylan Elizabeth Start of Care: 2020   Referral source: Kavita Kapadia Winanatoliyma : 1965                Medical Diagnosis: Pain in right shoulder [M25.511]  Payor: [employer] / Plan: [address] [place] [address] [address] / Product Type: Managed Care Medicaid /  Onset Date:20                Treatment Diagnosis: right shoulder pain   Prior Hospitalization: see medical history Provider#: [id number]   Medications: Verified on Patient summary List    Comorbidities: depression, DM, HTN, asthma   Prior Level of Function: currently not working. Staying with son who A with ADLs      Attended Visits: 7 Missed Visits: 1       Established Goals:         Excellent           Good         Limited           None  [x] Increased ROM   [x]  []  []  []  [x] Increased Strength  []  [x]  []  []  [x] Increased Mobility  []  [x]  []  []   [x] Decreased Pain   []  [x]  []  []  [] Decreased Swelling  []  []  []  []    Key Functional Changes: able to reach to objects more easily overhead, able to don her shoes and pants more easily, able to perform toileting duties more easily  Deficits: difficulty lifting objects to > shoulder height, difficulty doing her hair and donning her bra   AROM (measurements below)  Strength R GH joint: flexion/abd 3-/5 ER 3+/5 and IR 4-/5  Previous Goals:  Progress towards goals / Updated goals:  1. Patient will be independent with HEP to improve carryover of functional gains with ADLs between visits.             PN status; reports daily compliance, will update as we progress with protocol. Current: progressing  2. Pt will increase right passive shoulder flexion/ER to 140/ 50 degrees to increase ease with ADLs.               PN status;  143 deg flexion PROM, 30 deg ER PROM   Current: AROM R GH: flex 150/160deg abd 130/140 ER 55/60 IR 60/65  3.  Pt will increase right active shoulder flexion to 160 degrees to increase ease with ADLs.             PN status;  100 deg              Current: right shoulder AROM flex 150 deg  4. Pt will increase FOTO score to 59 points to demonstrate improved functional lift & carry.               PN status;  48   Current: 57/59    Updated Goals: to be achieved in 8 visits:  Pt will increase FOTO score to 59 points to demonstrate improved functional lift & carry. Pt will increase right active GH flexion to >/= 160 deg in order to more easily reach into cabinets  Pt will be independent with HEP to improve carryover of functional gains with ADLs between visits. Pt will demonstrate increased right GH flexion and abduction strength to >/= 3+/5 in order to more easily reach into overhead cabinets    ASSESSMENT/RECOMMENDATIONS:  continue with POC to address remaining limitations and aid in maximizing  function for a safe return to community integration     [x]Continue therapy per initial plan/protocol at a frequency of  1-2 x per week for 8 visits. []Continue therapy with the following recommended changes:_____________________      _____________________________________________________________________  []Discontinue therapy progressing towards or have reached established goals  []Discontinue therapy due to lack of appreciable progress towards goals  []Discontinue therapy due to lack of attendance or compliance  []Await Physician's recommendations/decisions regarding therapy  []Other:________________________________________________________________    Thank you for this referral.   Igor Colbert, PT 11/6/2020 10:05 AM    NOTE TO PHYSICIAN:  PLEASE COMPLETE THE ORDERS BELOW AND   FAX TO [facility] at the number above: If you are unable to process this request in 24 hours please contact our office at the number above    ?  I have read the above report and request that my patient continue as recommended. ? I have read the above report and request that my patient continue therapy with the following changes/special instructions:____________________________________  ? I have read the above report and request that my patient be discharged from therapy.     Physicians signature: ______________________________Date: ______Time:______

## 2020-11-06 NOTE — PROGRESS NOTES
PT DAILY TREATMENT NOTE 10-18    Patient Name: Mauro Cohen  Date:2020  : 1965  [x]  Patient  Verified  Payor: Scottie Denver / Plan: [employer] / Product Type: Managed Care Medicaid /    In time: 332  Out time: 1082  Total Treatment Time (min): 50  Visit #: 7 of 8    Treatment Area: Pain in right shoulder [M25.511]    SUBJECTIVE  Pain Level (0-10 scale): 0  Any medication changes, allergies to medications, adverse drug reactions, diagnosis change, or new procedure performed?: [x] No    [] Yes (see summary sheet for update)  Subjective functional status/changes:   [] No changes reported  \"I'm doing good today\"    PN COMPLETED    OBJECTIVE       25 min Therapeutic Exercise:  [x]? See flow sheet :   Rationale: increase ROM and increase strength to improve the patients ability to perform ADLs     10 min Therapeutic Activity:  [x]? See flow sheet :functional reaching   Rationale: increase strength, improve coordination and increase proprioception  to improve the patients ability to increase ease with daily activities     10 min Neuromuscular Re-education:  [x]?   See flow sheet :scap re-ed   Rationale: increase strength, improve coordination and increase proprioception  to improve the patients ability to perform functional tasks            With   [] TE   [x] TA   [] neuro   [] other: Patient Education: [x] Review HEP    [] Progressed/Changed HEP based on:   [x] positioning   [] body mechanics   [] transfers   [x] heat/ice application    [] other:      Other Objective/Functional Measures:    SEE PN    Pain Level (0-10 scale) post treatment: 0    ASSESSMENT/Changes in Function:   SEE PN    Patient will continue to benefit from skilled PT services to modify and progress therapeutic interventions, address functional mobility deficits, address ROM deficits, address strength deficits, analyze and address soft tissue restrictions, analyze and cue movement patterns, analyze and modify body mechanics/ergonomics, assess and modify postural abnormalities, address imbalance/dizziness and instruct in home and community integration to attain remaining goals.      []  See Plan of Care  [x]  See progress note/recertification  []  See Discharge Summary         Progress towards goals / Updated goals:  SEE UPDATED GOALS IN PN    PLAN  [x]  Upgrade activities as tolerated     [x]  Continue plan of care  []  Update interventions per flow sheet       []  Discharge due to:_  []  Other:_      Tanya Wagner PT 11/6/2020  8:21 AM    Future Appointments   Date Time Provider Laurie Darden   12/2/2020  1:45 PM MITRA Guzman [facility] BS AMB

## 2020-11-12 ENCOUNTER — APPOINTMENT (OUTPATIENT)
Dept: PHYSICAL THERAPY | Age: 55
End: 2020-11-12
Payer: MEDICAID

## 2020-11-17 ENCOUNTER — HOSPITAL ENCOUNTER (OUTPATIENT)
Dept: PHYSICAL THERAPY | Age: 55
Discharge: HOME | End: 2020-11-17
Payer: MEDICAID

## 2020-11-17 PROCEDURE — 97530 THERAPEUTIC ACTIVITIES: CPT

## 2020-11-17 PROCEDURE — 97110 THERAPEUTIC EXERCISES: CPT

## 2020-11-17 PROCEDURE — 97112 NEUROMUSCULAR REEDUCATION: CPT

## 2020-11-17 NOTE — PROGRESS NOTES
PT DAILY TREATMENT NOTE     Patient Name: Jackson George  Date:2020  : 1965  [x]  Patient  Verified  Payor: Jairon De La O / Plan: [facility] / Product Type: Managed Care Medicaid /    In time:8:55  Out time:9:44  Total Treatment Time (min): 49  Visit #: 1 of 8    Treatment Area: Pain in right shoulder [M25.511]    SUBJECTIVE  Pain Level (0-10 scale): 0  Any medication changes, allergies to medications, adverse drug reactions, diagnosis change, or new procedure performed?: [x] No    [] Yes (see summary sheet for update)  Subjective functional status/changes:   [] No changes reported  Pt reports having some swelling in her arm still    OBJECTIVE     24 min Therapeutic Exercise:  [x] See flow sheet :   Rationale: increase ROM and increase strength to improve the patients ability to perform ADLs    10 min Therapeutic Activity:  [x]  See flow sheet :   Rationale: increase ROM, increase strength and increase proprioception  to improve the patients ability to increase ease with daily tasks     15 min Neuromuscular Re-education:  [x]  See flow sheet :   Rationale: increase strength and increase proprioception  to improve the patients ability to perform functional tasks       With   [] TE   [] TA   [] neuro   [] other: Patient Education: [x] Review HEP    [] Progressed/Changed HEP based on:   [] positioning   [] body mechanics   [] transfers   [] heat/ice application    [] other:      Other Objective/Functional Measures:   Cont's min vc's to avoid shoulder hike     Pain Level (0-10 scale) post treatment: 0    ASSESSMENT/Changes in Function: Pt cont's to req occasional vcs to avoid shoulder hike. Notes ant shoulder pain with scap stab ball on wall and \"popping\" with horizontal abd in supine. No pain following treatment today.      Patient will continue to benefit from skilled PT services to modify and progress therapeutic interventions, address functional mobility deficits, address ROM deficits, address strength deficits, analyze and address soft tissue restrictions, analyze and cue movement patterns, analyze and modify body mechanics/ergonomics and assess and modify postural abnormalities to attain remaining goals. []  See Plan of Care  []  See progress note/recertification  []  See Discharge Summary         Progress towards goals / Updated goals:  1. Patient will be independent with HEP to improve carryover of functional gains with ADLs between visits.             PN status; reports daily compliance, will update as we progress with protocol. Current: progressing  2. Pt will increase right passive shoulder flexion/ER to 140/ 50 degrees to increase ease with ADLs.             PN status;  143 deg flexion PROM, 30 deg ER PROM              Current: AROM R GH: flex 150/160deg abd 130/140 ER 55/60 IR 60/65  3.  Pt will increase right active shoulder flexion to 160 degrees to increase ease with ADLs.             PN status;  100 deg              Current: right shoulder AROM flex 150 deg  4.  Pt will increase FOTO score to 59 points to demonstrate improved functional lift & carry.               PN status;  48              Current: 57/59    PLAN  []  Upgrade activities as tolerated     [x]  Continue plan of care  []  Update interventions per flow sheet       []  Discharge due to:_  []  Other:_      Jacqueilne Kumar PTA 11/17/2020  8:58 AM    Future Appointments   Date Time Provider Laurie Katalina   11/17/2020  9:00 AM [facility]   11/19/2020  9:45 AM NEEL [facility]PTHS [facility]   11/19/2020 10:45 AM Azalia Tavera MD [facility] BS AMB   11/20/2020  2:30 PM Tricia Thompson MD [facility] BS AMB   11/23/2020  9:45 AM NEEL Moore [facility]   11/25/2020  9:45 AM NEEL Moore [facility]   12/2/2020  1:45 PM MITRA Hooker [facility] BS AMB

## 2020-11-19 ENCOUNTER — HOSPITAL ENCOUNTER (OUTPATIENT)
Dept: PHYSICAL THERAPY | Age: 55
Discharge: HOME | End: 2020-11-19
Payer: MEDICAID

## 2020-11-19 ENCOUNTER — VIRTUAL VISIT (OUTPATIENT)
Dept: FAMILY MEDICINE CLINIC | Age: 55
End: 2020-11-19

## 2020-11-19 DIAGNOSIS — Z91.199 NO-SHOW FOR APPOINTMENT: Primary | ICD-10-CM

## 2020-11-19 PROCEDURE — 97530 THERAPEUTIC ACTIVITIES: CPT

## 2020-11-19 PROCEDURE — 97112 NEUROMUSCULAR REEDUCATION: CPT

## 2020-11-19 PROCEDURE — 97110 THERAPEUTIC EXERCISES: CPT

## 2020-11-19 NOTE — PROGRESS NOTES
1st attempt- No answer. Left message to return call for check in prior to virtual appointment. 2nd attempt-No answer    This encounter was created in error - please disregard.

## 2020-11-19 NOTE — PROGRESS NOTES
PT DAILY TREATMENT NOTE     Patient Name: Lindsay Avitia  Date:2020  : 1965  [x]  Patient  Verified  Payor: Mario Hansen / Plan: [facility] [employer] [employer] [facility] / Product Type: Managed Care Medicaid /    In time:956  Out time:1048  Total Treatment Time (min): 46  Visit #: 2 of 8      Treatment Area: Pain in right shoulder [M25.511]    SUBJECTIVE  Pain Level (0-10 scale): 3  Any medication changes, allergies to medications, adverse drug reactions, diagnosis change, or new procedure performed?: [x] No    [] Yes (see summary sheet for update)  Subjective functional status/changes:   [] No changes reported  \"I don't feel too bad today, just a little sore\"    OBJECTIVE    Modality rationale: Patient declined    Min Type Additional Details    [] Estim:  []Unatt       []IFC  []Premod                        []Other:  []w/ice   []w/heat  Position:  Location:    [] Estim: []Att    []TENS instruct  []NMES                    []Other:  []w/US   []w/ice   []w/heat  Position:  Location:    []  Traction: [] Cervical       []Lumbar                       [] Prone          []Supine                       []Intermittent   []Continuous Lbs:  [] before manual  [] after manual    []  Ultrasound: []Continuous   [] Pulsed                           []1MHz   []3MHz W/cm2:  Location:    []  Iontophoresis with dexamethasone         Location: [] Take home patch   [] In clinic    []  Ice     []  heat  []  Ice massage  []  Laser   []  Anodyne Position:  Location:    []  Laser with stim  []  Other:  Position:  Location:    []  Vasopneumatic Device Pressure:       [] lo [] med [] hi   Temperature: [] lo [] med [] hi   [] Skin assessment post-treatment:  []intact []redness- no adverse reaction    []redness  adverse reaction:         12 min Therapeutic Exercise:  [x] See flow sheet : shoulder strengthening exercises   Rationale: increase ROM and increase strength to improve the patients ability to preform daily ADLs    20 min Therapeutic Activity:  [x]  See flow sheet : functional reaching   Rationale: increase ROM, increase strength, improve coordination and increase proprioception  to improve the patients ability to preform function reaching tasks and daily reaching activities with proper body mechanics     20 min Neuromuscular Re-education:  [x]  See flow sheet : shoulder flexion and  scaption re-education   Rationale: increase strength, improve coordination and increase proprioception  to improve the patients ability to preform daily functional tasks               With   [x] TE   [x] TA   [x] neuro   [] other: Patient Education: [x] Review HEP    [] Progressed/Changed HEP based on:   [] positioning   [] body mechanics   [] transfers   [] heat/ice application    [] other:      Other Objective/Functional Measures:     Pain Level (0-10 scale) post treatment: 4    ASSESSMENT/Changes in Function: Ms. Mela Gamble did well with theraband exercises and she also did well with a progression with arm circles with a weighted ball. She preformed all active range of motion and strengthening activities well with no compensations aside from slightly hiking up her right shoulder which was corrected with vocal cues. Patient will continue to benefit from skilled PT services to modify and progress therapeutic interventions, address functional mobility deficits, address ROM deficits, address strength deficits, analyze and address soft tissue restrictions, analyze and cue movement patterns, analyze and modify body mechanics/ergonomics and instruct in home and community integration to attain remaining goals. [x]  See Plan of Care  []  See progress note/recertification  []  See Discharge Summary         Progress towards goals / Updated goals:  1. Patient will be independent with HEP to improve carryover of functional gains with ADLs between visits.                PN status; reports daily compliance, will update as we progress with protocol.              SROTZCU: progressing  2. Pt will increase right passive shoulder flexion/ER to 140/ 50 degrees to increase ease with ADLs.             PN status;  143 deg flexion PROM, 30 deg ER PROM              Current: AROM R GH: flex 150/160deg abd 130/140 ER 55/60 IR 60/65  3.  Pt will increase right active shoulder flexion to 160 degrees to increase ease with ADLs.             PN status;  100 deg              Current: right shoulder AROM flex 150 deg  4.  Pt will increase FOTO score to 59 points to demonstrate improved functional lift & carry.               PN status;  48              Current: 57/59    PLAN  []  Upgrade activities as tolerated     [x]  Continue plan of care  []  Update interventions per flow sheet       []  Discharge due to:_  []  Other:_      MYA Broussard 11/19/2020  9:58 AM    Future Appointments   Date Time Provider Laurie Darden   11/19/2020 10:45 AM Severa Harpin, MD [facility] BS AMB   11/20/2020  2:30 PM Jono Sanon MD [facility] BS AMB   11/23/2020  9:45 AM Zully Song P[facility]   11/25/2020  9:45 [facility]   12/2/2020  1:45 PM MITRA Pinto [facility] BS AMB

## 2020-11-20 ENCOUNTER — VIRTUAL VISIT (OUTPATIENT)
Dept: FAMILY MEDICINE CLINIC | Age: 55
End: 2020-11-20
Payer: MEDICAID

## 2020-11-20 DIAGNOSIS — E11.9 TYPE 2 DIABETES MELLITUS WITHOUT COMPLICATION, WITHOUT LONG-TERM CURRENT USE OF INSULIN (HCC): ICD-10-CM

## 2020-11-20 DIAGNOSIS — Z12.11 COLON CANCER SCREENING: ICD-10-CM

## 2020-11-20 DIAGNOSIS — Z12.31 ENCOUNTER FOR SCREENING MAMMOGRAM FOR MALIGNANT NEOPLASM OF BREAST: Primary | ICD-10-CM

## 2020-11-20 DIAGNOSIS — Z76.89 ENCOUNTER TO ESTABLISH CARE WITH NEW DOCTOR: ICD-10-CM

## 2020-11-20 DIAGNOSIS — Z12.31 ENCOUNTER FOR SCREENING MAMMOGRAM FOR MALIGNANT NEOPLASM OF BREAST: ICD-10-CM

## 2020-11-20 PROCEDURE — 99213 OFFICE O/P EST LOW 20 MIN: CPT | Performed by: STUDENT IN AN ORGANIZED HEALTH CARE EDUCATION/TRAINING PROGRAM

## 2020-11-20 NOTE — PROGRESS NOTES
Ammon Oleary, who was evaluated through a synchronous (real-time) audio-video encounter, and/or her healthcare decision maker, is aware that it is a billable service, with coverage as determined by her insurance carrier. She provided verbal consent to proceed: Yes, and patient identification was verified. It was conducted pursuant to the emergency declaration under the [address] authority and the [employer] Act. A caregiver was present when appropriate. Ability to conduct physical exam was limited. I was in the office. The patient was at home. History of Present Illness  Ammon Oleary is a 50 y.o. female who presents today for management of    Chief Complaint   Patient presents with   [facility] Establish Care       Patient is here to establish care. Previous PCP: Dr. Josh Parrish    Patient presents today to establish care. States she to has no concerns at this time. States that she has all her medication and is taking it as prescribed. Denies any side effects from medicine at this point. Discussed with patient about her recent surgery on her right shoulder. States that she is still seeing PT for this. She is to see orthopedist soon due to some issues she still having with shoulder.     Past Medical History  Past Medical History:   Diagnosis Date    Allergic rhinitis     Anxiety and depression     Asthma 11/3/2010    Chronic pain in right shoulder     Depression 10/19/2015    Diabetes ([address]     Eczema 10/19/2015    Head pain 4/28/2014    HTN (hypertension)     Multiple environmental allergies     Nicotine dependence     Syphilis in female 3/2/2015        Surgical History  Past Surgical History:   Procedure Laterality Date    ENDOSCOPY, COLON, DIAGNOSTIC      HX TUBAL LIGATION Bilateral         Current Medications  Current Outpatient Medications   Medication Sig    metFORMIN ER (GLUCOPHAGE XR) 500 mg tablet Take 1 Tab by mouth daily (with dinner).  traZODone (DESYREL) 150 mg tablet Take 1 Tab by mouth nightly.  hydroCHLOROthiazide (HYDRODIURIL) 25 mg tablet TAKE 1 TABLET BY MOUTH EVERY DAY    pravastatin (PRAVACHOL) 40 mg tablet TAKE 1 TABLET BY MOUTH EVERY DAY AT NIGHT    budesonide (PULMICORT FLEXHALER) 90 mcg/actuation aepb inhaler Take 2 Puffs by inhalation two (2) times a day.  fluticasone propionate (FLONASE) 50 mcg/actuation nasal spray SPRAY 2 SPRAYS INTO EACH NOSTRIL EVERY DAY    famotidine (PEPCID) 20 mg tablet TAKE 1 TABLET BY MOUTH TWICE A DAY    losartan (COZAAR) 25 mg tablet TAKE 1 TABLET BY MOUTH EVERY DAY    montelukast (SINGULAIR) 10 mg tablet Take 1 Tab by mouth daily.  buPROPion SR (WELLBUTRIN SR) 150 mg SR tablet Take 1 Tab by mouth two (2) times a day.  ketoconazole (NIZORAL) 2 % topical cream Apply once daily to cover the affected and immediate surrounding area for 2 weeks    albuterol (PROVENTIL HFA, VENTOLIN HFA, PROAIR HFA) 90 mcg/actuation inhaler Take 2 Puffs by inhalation every six (6) hours as needed for Wheezing. No current facility-administered medications for this visit. Allergies/Drug Reactions  Allergies   Allergen Reactions    Aspirin Nausea and Vomiting    Lisinopril Cough    Prednisone Swelling        Family History  Family History   Problem Relation Age of Onset    Breast Cancer Maternal Grandmother     Cancer Mother         Social History  Social History     Tobacco Use    Smoking status: Current Every Day Smoker     Packs/day: 0.50     Types: Cigarettes    Smokeless tobacco: Never Used    Tobacco comment: quit in january, smokes one occ   Substance Use Topics    Alcohol use:  Yes     Alcohol/week: 2.0 standard drinks     Types: 2 Shots of liquor per week    Drug use: Yes     Types: Cocaine        Health Maintenance   Topic Date Due    MICROALBUMIN Q1  11/14/1977    Eye Exam Retinal or Dilated  11/14/1977    Colorectal Cancer Screening Combo  11/14/2017    Breast Cancer Screen Mammogram  11/14/2017    PAP AKA CERVICAL CYTOLOGY  06/23/2018    Flu Vaccine (1) 09/01/2020    DTaP/Tdap/Td series (2 - Td) 11/19/2020    Shingrix Vaccine Age 50> (1 of 2) 11/29/2020 (Originally 11/14/2017)    Pneumococcal 0-64 years (1 of 1 - PPSV23) 02/12/2021 (Originally 1/14/2011)    A1C test (Diabetic or Prediabetic)  06/15/2021    Lipid Screen  06/15/2021    Foot Exam Q1  08/24/2021     Immunization History   Administered Date(s) Administered    (RETIRED) Pneumococcal Vaccine (Unspecified Type) 11/19/2010    Influenza Vaccine (Quad) PF (>6 Mo Flulaval, Fluarix, and >3 Yrs Afluria, Fluzone 27258) 10/02/2019    Influenza Vaccine Split 11/19/2010    TDAP Vaccine 11/19/2010       Review of Systems  Review of Systems   Constitutional: Negative for chills, fever and malaise/fatigue. HENT: Negative for congestion, ear discharge and ear pain. Eyes: Negative for blurred vision, pain and discharge. Respiratory: Negative for cough and shortness of breath. Cardiovascular: Negative for chest pain and palpitations. Gastrointestinal: Negative for abdominal pain, nausea and vomiting. Genitourinary: Negative for dysuria, frequency and urgency. Musculoskeletal: Positive for joint pain (R shoulder. Had surgery in 07/2020). Skin: Negative for itching and rash. Neurological: Negative for dizziness, seizures, loss of consciousness and headaches. Psychiatric/Behavioral: Negative for substance abuse. Physical Exam  Vital signs: There were no vitals filed for this visit. Physical Exam  Constitutional:       Appearance: Normal appearance. Eyes:      General: No scleral icterus. Right eye: No discharge. Left eye: No discharge. Neck:      Musculoskeletal: Normal range of motion and neck supple. Pulmonary:      Effort: Pulmonary effort is normal. No respiratory distress.    Neurological:      Mental Status: She is alert and oriented to person, place, and time. Cranial Nerves: No cranial nerve deficit. Psychiatric:         Mood and Affect: Mood normal.         Behavior: Behavior normal.         Thought Content: Thought content normal.         Judgment: Judgment normal.           Laboratory/Tests:  Ref Range & Units  5mo ago    Hemoglobin A1c  4.8 - 5.6 %  6.2High       AVG GLU  91 - 123 mg/dL  130High       Collected:  6/15/2020 10:58   View Full Report   Ref Range & Units  5mo ago   Triglyceride  40 - 149 mg/dL  82     HDL Cholesterol  >=40 mg/dL  35Low       Cholesterol, total  110 - 200 mg/dL  124     CHOLESTEROL/HDL  0.0 - 5.0  3.5     Non-HDL Cholesterol  <130 mg/dL  89     LDL, calculated  50 - 99 mg/dL  72     VLDL, calculated  8 - 30 mg/dL  16     LDL/HDL Ratio   2.0            Collected:  7/9/2020 08:49   View Full Report   Ref Range & Units  4mo ago   Sodium  136 - 145 mmol/L  143     Potassium  3.5 - 5.5 mmol/L  4.5     Chloride  100 - 111 mmol/L  107     CO2  21 - 32 mmol/L  30     Anion gap  3.0 - 18 mmol/L  6     Glucose  74 - 99 mg/dL  100High       BUN  7.0 - 18 MG/DL  14     Creatinine  0.6 - 1.3 MG/DL  0.99     BUN/Creatinine ratio  12 - 20    14     GFR est AA  >60 ml/min/1.73m2  >60     GFR est non-AA  >60 ml/min/1.73m2  59Low          Calcium  8.5 - 10.1 MG/DL  9.0     Bilirubin, total  0.2 - 1.0 MG/DL  0.5     ALT (SGPT)  13 - 56 U/L  30     AST (SGOT)  10 - 38 U/L  20     Alk.  phosphatase  45 - 117 U/L  92     Protein, total  6.4 - 8.2 g/dL  7.0     Albumin  3.4 - 5.0 g/dL  3.5     Globulin  2.0 - 4.0 g/dL  3.5     A-G Ratio  0.8 - 1.7    1.0             Ref Range & Units  4mo ago   WBC  4.6 - 13.2 K/uL  10.7     RBC  4.20 - 5.30 M/uL  4.77     HGB  12.0 - 16.0 g/dL  12.6     HCT  35.0 - 45.0 %  38.9     MCV  74.0 - 97.0 FL  81.6     MCH  24.0 - 34.0 PG  26.4     MCHC  31.0 - 37.0 g/dL  32.4     RDW  11.6 - 14.5 %  14.1     PLATELET  104 - 159 K/uL  343     MPV  9.2 - 11.8 FL  9.9 NEUTROPHILS  40 - 73 %  60     LYMPHOCYTES  21 - 52 %  29     MONOCYTES  3 - 10 %  7     EOSINOPHILS  0 - 5 %  4     BASOPHILS  0 - 2 %  0     ABS. NEUTROPHILS  1.8 - 8.0 K/UL  6.4     ABS. LYMPHOCYTES  0.9 - 3.6 K/UL  3.1     ABS. MONOCYTES  0.05 - 1.2 K/UL  0.8     ABS. EOSINOPHILS  0.0 - 0.4 K/UL  0.4     ABS. BASOPHILS  0.0 - 0.1 K/UL  0.0     DF     AUTOMATED        Assessment/Plan:    1. Encounter for screening mammogram for malignant neoplasm of breast  - LEX MAMMO BI SCREENING INCL CAD; Future    2. Colon cancer screening  - REFERRAL TO GASTROENTEROLOGY    3. Type 2 diabetes mellitus without complication, without long-term current use of insulin (HCC)  - HEMOGLOBIN A1C WITH EAG; Future  - MICROALBUMIN, UR, RAND W/ MICROALB/CREAT RATIO; Future    4. Encounter to establish care with new doctor  We will continue to follow  As her new PCP. Lab review: most recent lipid panel reviewed, showing LDL result meets goal, HDL low, triglycerides normal, liver functions are normal, orders written for new lab studies as appropriate; see orders      I have discussed the diagnosis with the patient and the intended plan as seen in the above orders. Questions were answered concerning future plans. I have reviewed the plan of care with the patient, accepted their input and they are in agreement with the treatment goals.        Umair Thomas MD  November 20, 2020

## 2020-11-23 ENCOUNTER — HOSPITAL ENCOUNTER (OUTPATIENT)
Dept: PHYSICAL THERAPY | Age: 55
Discharge: HOME | End: 2020-11-23
Payer: MEDICAID

## 2020-11-23 PROCEDURE — 97110 THERAPEUTIC EXERCISES: CPT

## 2020-11-23 PROCEDURE — 97112 NEUROMUSCULAR REEDUCATION: CPT

## 2020-11-23 PROCEDURE — 97530 THERAPEUTIC ACTIVITIES: CPT

## 2020-11-23 NOTE — PROGRESS NOTES
PT DAILY TREATMENT NOTE     Patient Name: Marleny Allen  Date:2020  : 1965  [x]  Patient  Verified  Payor: Peter Ulloa / Plan: [place] [employer] / Product Type: Managed Care Medicaid /    In time:940  Out time:1028  Total Treatment Time (min): 48  Visit #: 3 of 8    Treatment Area: Pain in right shoulder [M25.511]    SUBJECTIVE  Pain Level (0-10 scale): 0  Any medication changes, allergies to medications, adverse drug reactions, diagnosis change, or new procedure performed?: [x] No    [] Yes (see summary sheet for update)  Subjective functional status/changes:   [] No changes reported  \"I don't have any pain right now. \"    OBJECTIVE    Modality rationale: patient declined   Min Type Additional Details    [] Estim:  []Unatt       []IFC  []Premod                        []Other:  []w/ice   []w/heat  Position:  Location:    [] Estim: []Att    []TENS instruct  []NMES                    []Other:  []w/US   []w/ice   []w/heat  Position:  Location:    []  Traction: [] Cervical       []Lumbar                       [] Prone          []Supine                       []Intermittent   []Continuous Lbs:  [] before manual  [] after manual    []  Ultrasound: []Continuous   [] Pulsed                           []1MHz   []3MHz W/cm2:  Location:    []  Iontophoresis with dexamethasone         Location: [] Take home patch   [] In clinic    []  Ice     []  heat  []  Ice massage  []  Laser   []  Anodyne Position:  Location:    []  Laser with stim  []  Other:  Position:  Location:    []  Vasopneumatic Device Pressure:       [] lo [] med [] hi   Temperature: [] lo [] med [] hi   [] Skin assessment post-treatment:  []intact []redness- no adverse reaction    []redness  adverse reaction:     10 min Therapeutic Exercise:  [x] See flow sheet :   Rationale: increase ROM and increase strength to improve the patients ability to perform ADLs    10 min Therapeutic Activity:  [x]  See flow sheet : functional reaching   Rationale: increase ROM, increase strength, improve coordination, improve balance and increase proprioception  to improve the patients ability to improve mobility and reaching      28 min Neuromuscular Re-education:  [x]  See flow sheet : scap re-ed activities   Rationale: increase ROM, increase strength, improve coordination, improve balance and increase proprioception  to improve the patients ability to improve mobility and reaching         With   [x] TE   [x] TA   [x] neuro   [] other: Patient Education: [x] Review HEP    [] Progressed/Changed HEP based on:   [x] positioning   [x] body mechanics   [] transfers   [] heat/ice application    [] other:      Other Objective/Functional Measures:      Pain Level (0-10 scale) post treatment: 2-3    ASSESSMENT/Changes in Function: Pt is making steady progress with her ROM and functional mobility. She reports having some difficulty with reaching behind her head over the weekend while brushing her hair. Patient will continue to benefit from skilled PT services to modify and progress therapeutic interventions, address functional mobility deficits, address ROM deficits, address strength deficits, analyze and address soft tissue restrictions, analyze and cue movement patterns, analyze and modify body mechanics/ergonomics, assess and modify postural abnormalities, address imbalance/dizziness and instruct in home and community integration to attain remaining goals. [x]  See Plan of Care  []  See progress note/recertification  []  See Discharge Summary         Progress towards goals / Updated goals:  1. Patient will be independent with HEP to improve carryover of functional gains with ADLs between visits.             PN status; reports daily compliance, will update as we progress with protocol.              Current: progressing  2. Pt will increase right passive shoulder flexion/ER to 140/ 50 degrees to increase ease with ADLs.               PN status;  143 deg flexion PROM, 30 deg ER PROM              Current: AROM R GH: flex 150/160deg abd 130/140 ER 55/60 IR 60/65  3.  Pt will increase right active shoulder flexion to 160 degrees to increase ease with ADLs.             PN status;  100 deg              Current: right shoulder AROM flex 150 deg  4.  Pt will increase FOTO score to 59 points to demonstrate improved functional lift & carry.               PN status;  48              Current: 57/59    PLAN  []  Upgrade activities as tolerated     [x]  Continue plan of care  []  Update interventions per flow sheet       []  Discharge due to:_  []  Other:_      Dereck Fields PTA, CSCS 11/23/2020  10:30 AM    Future Appointments   Date Time Provider Laurie Darden   11/23/2020  9:45 AM Becca Srinivasan PTA [facility]   11/25/2020  9:45 AM Becca Srinivasan PTA [facility]   12/2/2020  1:45 PM MITRA Verma [facility] BS AMB

## 2020-11-25 ENCOUNTER — HOSPITAL ENCOUNTER (OUTPATIENT)
Dept: PHYSICAL THERAPY | Age: 55
Discharge: HOME | End: 2020-11-25
Payer: MEDICAID

## 2020-11-25 PROCEDURE — 97530 THERAPEUTIC ACTIVITIES: CPT

## 2020-11-25 PROCEDURE — 97112 NEUROMUSCULAR REEDUCATION: CPT

## 2020-11-25 PROCEDURE — 97110 THERAPEUTIC EXERCISES: CPT

## 2020-11-25 NOTE — PROGRESS NOTES
PT DAILY TREATMENT NOTE     Patient Name: Altagracia Caraballo  Date:2020  : 1965  [x]  Patient  Verified  Payor: Azalea Churchill / Plan: [facility] [employer] / Product Type: Managed Care Medicaid /    In time:9:48  Out time:10:30  Total Treatment Time (min): 42  Visit #: 4 of 8    Treatment Area: Pain in right shoulder [M25.511]    SUBJECTIVE  Pain Level (0-10 scale): 3-4  Any medication changes, allergies to medications, adverse drug reactions, diagnosis change, or new procedure performed?: [x] No    [] Yes (see summary sheet for update)  Subjective functional status/changes:   [] No changes reported  \"I'm not sure what I did but my arm has been hurting.  It hurt real abd last night and today it hurts when I move it back\"    OBJECTIVE    Modality rationale: Patient Declined    Min Type Additional Details    [] Estim:  []Unatt       []IFC  []Premod                        []Other:  []w/ice   []w/heat  Position:  Location:    [] Estim: []Att    []TENS instruct  []NMES                    []Other:  []w/US   []w/ice   []w/heat  Position:  Location:    []  Traction: [] Cervical       []Lumbar                       [] Prone          []Supine                       []Intermittent   []Continuous Lbs:  [] before manual  [] after manual    []  Ultrasound: []Continuous   [] Pulsed                           []1MHz   []3MHz W/cm2:  Location:    []  Iontophoresis with dexamethasone         Location: [] Take home patch   [] In clinic    []  Ice     []  heat  []  Ice massage  []  Laser   []  Anodyne Position:  Location:    []  Laser with stim  []  Other:  Position:  Location:    []  Vasopneumatic Device Pressure:       [] lo [] med [] hi   Temperature: [] lo [] med [] hi   [] Skin assessment post-treatment:  []intact []redness- no adverse reaction    []redness - adverse reaction:       10 min Therapeutic Exercise:  [x] See flow sheet :   Rationale: increase ROM and increase strength to improve the patients ability to perform ADLs    22 min Therapeutic Activity:  [x]  See flow sheet : functional reaching   Rationale: increase strength, improve coordination and improve balance  to improve the patients ability to improve functional reaching and mobility     10 min Neuromuscular Re-education:  [x]  See flow sheet : scap re-ed   Rationale: improve coordination, improve balance and increase proprioception  to improve the patients ability to improve functional reaching and mobility        With   [x] TE   [x] TA   [x] neuro   [] other: Patient Education: [x] Review HEP    [] Progressed/Changed HEP based on:   [x] positioning   [x] body mechanics   [] transfers   [x] heat/ice application    [] other:      Other Objective/Functional Measures:      Pain Level (0-10 scale) post treatment: 0    ASSESSMENT/Changes in Function: Ms. Blanka Bustos had a lot of pain in her right shoulder when performing shoulder extension and external rotation activities and needed to regress most exercises due to her pain. Patient will continue to benefit from skilled PT services to modify and progress therapeutic interventions, address functional mobility deficits, address ROM deficits, address strength deficits, analyze and address soft tissue restrictions, analyze and cue movement patterns, analyze and modify body mechanics/ergonomics, assess and modify postural abnormalities, address imbalance/dizziness and instruct in home and community integration to attain remaining goals. [x]  See Plan of Care  []  See progress note/recertification  []  See Discharge Summary         Progress towards goals / Updated goals:  1. Patient will be independent with HEP to improve carryover of functional gains with ADLs between visits.             PN status; reports daily compliance, will update as we progress with protocol.              Current: progressing  2.  Pt will increase right passive shoulder flexion/ER to 140/ 50 degrees to increase ease with ADLs.             PN status;  143 deg flexion PROM, 30 deg ER PROM              Current: AROM R GH: flex 150/160deg abd 130/140 ER 55/60 IR 60/65  3.  Pt will increase right active shoulder flexion to 160 degrees to increase ease with ADLs.             PN status;  100 deg              Current: right shoulder AROM flex 150 deg  4.  Pt will increase FOTO score to 59 points to demonstrate improved functional lift & carry.               PN status;  48              Current: 57/59    PLAN  []  Upgrade activities as tolerated     [x]  Continue plan of care  []  Update interventions per flow sheet       []  Discharge due to:_  []  Other:_      MYA Elkins 11/25/2020  10:37 AM    Future Appointments   Date Time Provider Laurie Darden   11/25/2020  9:45 AM [facility]   12/2/2020  1:45 PM MITRA Scott [facility] AMB

## 2020-11-30 NOTE — PROGRESS NOTES
Rubén Rosario Micky  1965     HISTORY OF PRESENT ILLNESS  Vicente Duval is a 50 y.o. female who presents today for evaluation S/P Right shoulder arthroscopic rotator cuff repair, biceps tenotomy and glenohumeral debridment on 7/13/20. Patient has been going to PT. Describes pain as a 6/10. Has been taking nothing for pain. Still has night pain. She has been compliant with her exercises and restrictions. She was in PT last week and feels like she over did it and has had pain ever since. She remembers pushing through pain with lifting 2-3 lbs in front of her and performing external rotation with bands. The pain is sharp and achy located to the lateral aspect of her shoulder. She has taken ibuprofen which has not helped. Patient denies any fever, chills, chest pain, shortness of breath or calf pain. There are no new illness or injuries to report since last seen in the office. PHYSICAL EXAM:   Visit Vitals  /79 (BP 1 Location: Left arm)   Pulse 91   Temp 97.4 °F (36.3 °C)   Resp 16   Ht 5' 4\" (1.626 m)   Wt 236 lb (107 kg)   SpO2 96%   BMI 40.51 kg/m²      The patient is a well-developed, well-nourished female in no acute distress. The patient is alert and oriented times three. The patient appears to be well groomed. Mood and affect are normal.  ORTHOPEDIC EXAM of right shoulder:  Inspection: swelling not present,  Bruising not present  Incision well healed  Passive glenohumeral abduction 0-90, 90 PFF, 90 AFF, 60 PER, IR lateral buttock  Stability: Stable  Strength: n/a  2+ distal pulses    IMPRESSION:  S/P Right shoulder arthroscopic rotator cuff repair, biceps tenotomy and glenohumeral debridment    PLAN:   Pt doing well post operatively  I think she overdid it in PT so we will have her take a break for another week. Her ROM has regressed since overdoing it. Ordered more PT and put in the date she is to start. She will continue to do what she can at home exercise wise.   Will prescribe diclofenac since ibuprofen has not helped. She will also use volteran gel to help. Will continue PT and exercises to work on ROM and start gentle strengthening  Stressed to patient that nothing causes an increase in pain. Pt not given a refill of pain medication today.    RTC 4 weeks    MIREYA Smith [phone]

## 2020-12-02 ENCOUNTER — OFFICE VISIT (OUTPATIENT)
Dept: ORTHOPEDIC SURGERY | Age: 55
End: 2020-12-02
Payer: MEDICAID

## 2020-12-02 VITALS
HEIGHT: 64 IN | OXYGEN SATURATION: 96 % | RESPIRATION RATE: 16 BRPM | TEMPERATURE: 97.4 F | SYSTOLIC BLOOD PRESSURE: 112 MMHG | HEART RATE: 91 BPM | BODY MASS INDEX: 40.29 KG/M2 | WEIGHT: 236 LBS | DIASTOLIC BLOOD PRESSURE: 79 MMHG

## 2020-12-02 DIAGNOSIS — Z98.890 STATUS POST ARTHROSCOPY OF RIGHT SHOULDER: ICD-10-CM

## 2020-12-02 DIAGNOSIS — S46.011A TRAUMATIC COMPLETE TEAR OF RIGHT ROTATOR CUFF, INITIAL ENCOUNTER: Primary | ICD-10-CM

## 2020-12-02 PROCEDURE — 99213 OFFICE O/P EST LOW 20 MIN: CPT | Performed by: ORTHOPAEDIC SURGERY

## 2020-12-07 ENCOUNTER — DOCUMENTATION ONLY (OUTPATIENT)
Dept: ORTHOPEDIC SURGERY | Age: 55
End: 2020-12-07

## 2020-12-07 DIAGNOSIS — M25.511 CHRONIC RIGHT SHOULDER PAIN: Primary | ICD-10-CM

## 2020-12-07 DIAGNOSIS — G89.29 CHRONIC RIGHT SHOULDER PAIN: Primary | ICD-10-CM

## 2020-12-07 RX ORDER — DICLOFENAC SODIUM 75 MG/1
75 TABLET, DELAYED RELEASE ORAL 2 TIMES DAILY WITH MEALS
Qty: 60 TAB | Refills: 0 | Status: SHIPPED | OUTPATIENT
Start: 2020-12-07 | End: 2021-08-31

## 2020-12-07 RX ORDER — DICLOFENAC SODIUM 10 MG/G
4 GEL TOPICAL 4 TIMES DAILY
Qty: 500 G | Refills: 0 | Status: SHIPPED | OUTPATIENT
Start: 2020-12-07 | End: 2021-03-18 | Stop reason: SDUPTHER

## 2020-12-07 NOTE — TELEPHONE ENCOUNTER
Patient states her pharmacy has not received the prescription for diclofenec and voltaren gel. Patient would like to be notified when meds have been sent. Please adv at [phone].

## 2020-12-07 NOTE — PROGRESS NOTES
Citizens Disability, Treating Source Statement, was received via USPS mail and placed in the forms bin at .

## 2020-12-15 ENCOUNTER — TELEPHONE (OUTPATIENT)
Dept: ORTHOPEDIC SURGERY | Age: 55
End: 2020-12-15

## 2020-12-15 NOTE — TELEPHONE ENCOUNTER
Reg from [facility] called requesting a status update on the disability form that was faxed to the [facility] office on 12/11/20. He is simply trying to verify that we received this. Please advise Rubens Neil back regarding this at [phone] option 2.

## 2020-12-16 ENCOUNTER — APPOINTMENT (OUTPATIENT)
Dept: PHYSICAL THERAPY | Age: 55
End: 2020-12-16
Payer: MEDICAID

## 2020-12-16 NOTE — TELEPHONE ENCOUNTER
Citizen's Disability form completed and faxed. Confirmation received. Copy made for scanning into patients chart. Hemalatha Harvey at the  will call patient to make aware form is completed.

## 2020-12-21 ENCOUNTER — HOSPITAL ENCOUNTER (OUTPATIENT)
Dept: PHYSICAL THERAPY | Age: 55
Discharge: HOME | End: 2020-12-21
Payer: MEDICAID

## 2020-12-21 PROCEDURE — 97112 NEUROMUSCULAR REEDUCATION: CPT

## 2020-12-21 PROCEDURE — 97110 THERAPEUTIC EXERCISES: CPT

## 2020-12-21 NOTE — PROGRESS NOTES
[facility] PHYSICAL THERAPY  [address] [facility] [address], [address] - Phone: [phone]  Fax: [phone]  [address] [place] [address] PHYSICAL THERAPY          Patient Name: Mauro Cohen : 1965   Treatment/Medical Diagnosis: Right shoulder pain [M25.511]   Onset Date:     Referral Source: Dunia Hubbard* Start of Care [facility] [place]): 2020   Prior Hospitalization: See Medical History Provider #:  [id number]   Prior Level of Function: Independent with ADL's   Comorbidities: Depression, DM, HTN, asthma   Medications: Verified on Patient Summary List   Visits from [facility]: 11 Missed Visits: See note     Goal/Measure of Progress Goal Met? 1. Pt will be independent with HEP to improve carryover of functional gains with ADLs between visits. Status at last Eval: Progressing Current Status: noncompliant due to pain no   2. Pt will increase right active GH flexion to >/= 160 deg in order to more easily reach into cabinets   Status at last Eval: 150 Current Status: 130 no   3. Pt will demonstrate increased right GH flexion and abduction strength to >/= 3+/5 in order to more easily reach into overhead cabinets   Status at last Eval: 3- Current Status: 3- no     Therapy has consisted of ten follow-up visits at the [address] and is now transferring to the [facility] clinic due to complications with transportation. The pt reports progressing towards discharge after having a rotator cuff repair, but then reports increased symptoms at home. The pt saw her referring doctor which instituted a two week hold on any AROM per pt reports. The pt now reports radicular symptoms throughout the right UE into the hand in addition to pulling symptoms across the anterior right shoulder. Key Functional Changes/Progress: The pt demonstrates roughly 3-/5 right glenohumeral MMT in all planes of motions.  Repeated C/S retractions with left lateral flexion decreased UE symptoms. The pt was educated to start with repeated C/S retractions with extension to assess its effect on symptoms prior to starting a regular exercise routine in our clinic in the coming weeks. The pt does demonstrate AROM loss due to weakness; however, the pt also demonstrates signs and symptoms consistent with capsular restrictions in PROM as well. See below for more details. Right UE AROM/PROM (degrees)  Flexion 130/165  Abduction 130/130  ER 35/50    C/S AROM (degrees)  Extension 30  Lateral Flexion 38 right, 43 left  Rotation 70 right, 55 left      Problem List: pain affecting function, decrease ROM, decrease strength, decrease ADL/ functional abilitiies, decrease activity tolerance and decrease flexibility/ joint mobility   Treatment Plan may include any combination of the following: Therapeutic exercise, Therapeutic activities, Neuromuscular re-education, Physical agent/modality, Manual therapy, Patient education and Self Care training   Goals for this certification period include and are to be achieved in   3-4  weeks:  1. Patient will centralize right UE radicular symptoms to the C/S to decrease risk of C/S dysfunction. 2.   Patient will be independent with an updated HEP to improve carryover upon discharge. 3.   Patient will demonstrate at least 150 degrees abduction AROM on the right UE to improve efficiency with dressing ADL's.  4.   Patient will demosntrate at last 70 degrees of C/S rotation AROM bilaterally to improve safety and efficiency while driving. Frequency / Duration:   Patient to be seen   2-3   times per week for   3-4    weeks:    Assessments/Recommendations: The patient would continue to benefit from skilled interventions to address the above mentioned functional deficits. See above for more details. If you have any questions/comments please contact us directly at [phone].    Thank you for allowing us to assist in the care of your patient. Therapist Signature: Girish Amin DPT Date: 82/61/4068   Certification Period:  Reporting Period: NA  11/6/2020 - 12/21/2020 Time: 4:46 PM   NOTE TO PHYSICIAN:  PLEASE COMPLETE THE ORDERS BELOW AND FAX TO   [facility]: [phone] If you are unable to process this request in 24 hours please contact our office: [phone].    ___ I have read the above report and request that my patient continue as recommended.   ___ I have read the above report and request that my patient continue therapy with the following changes/special instructions: ________________________________________________   ___ I have read the above report and request that my patient be discharged from therapy.      Physician Signature:        Date:       Time:

## 2020-12-21 NOTE — PROGRESS NOTES
PHYSICAL THERAPY - DAILY TREATMENT NOTE    Patient Name: Ramonita Montenegro        Date: 2020  : 1965   YES Patient  Verified  Visit #:     Insurance: Payor: Karolyn Chahal / Plan: Margarita Coronel / Product Type: Managed Care Medicaid /      In time: 9:45 Out time: 10:15   Total Treatment Time: 30     Medicare/BCBS Time Tracking (below)   Total Timed Codes (min):  NA 1:1 Treatment Time:  NA     TREATMENT AREA =  Right shoulder pain [M25.511]    SUBJECTIVE    Pain Level (on 0 to 10 scale):  4  / 10   Medication Changes/New allergies or changes in medical history, any new surgeries or procedures? NO     If yes, update Summary List   Subjective Functional Status/Changes:  []  No changes reported     \"I have had a lot of different things going on. \"          OBJECTIVE    Modalities Rationale: To improve activity tolerance for exercise performance and ADL's.    min [] Estim, type/location:                                      []  att     []  unatt     []  w/US     []  w/ice    []  w/heat    min []  Mechanical Traction: type/lbs                   []  pro   []  sup   []  int   []  cont    []  before manual    []  after manual    min []  Ultrasound, settings/location:      min []  Iontophoresis w/ dexamethasone, location:                                               []  take home patch       []  in clinic   NA min []  Ice     []  Heat    location/position:     min []  Vasopneumatic Device, press/temp:     min []  Other:    [x] Skin assessment post-treatment (if applicable):   [x]  intact    []  redness- no adverse reaction     []redness  adverse reaction:    15 min Therapeutic Exercise:  [x]  See flow sheet   Rationale:      increase ROM and increase strength to improve the patients ability to perform ADL's with improved periscapular stability.      15 min Neuromuscular Reeducation: Repeated C/S retractions with extension   Rationale:      decrease pain, increase ROM and increase tissue extensibility to improve patient's ability to centralize radicular symptoms. min Patient Education:  YES  Reviewed HEP   []  Progressed/Changed HEP based on:   Patient reports compliance     Other Objective/Functional Measures:    See progress note. Post Treatment Pain Level (on 0 to 10) scale:   3  / 10     ASSESSMENT    Assessment/Changes in Function:     See progress note. []  See Progress Note/Recertification   Patient will continue to benefit from skilled PT services to modify and progress therapeutic interventions, address functional mobility deficits, address ROM deficits, address strength deficits, analyze and address soft tissue restrictions, analyze and cue movement patterns, analyze and modify body mechanics/ergonomics and assess and modify postural abnormalities to attain remaining goals. Progress toward goals / Updated goals: Addressed UE AROM limitation with radiculopathy treatment.      PLAN    [x]  Upgrade activities as tolerated YES Continue plan of care   []  Discharge due to :    []  Other:      Therapist: Jeanine Dickens    Date: 12/21/2020 Time: 4:43 PM     Future Appointments   Date Time Provider Laurie Darden   12/28/2020  8:45 AM Luis Felipe Morgan PTA [facility]   12/31/2020  [facility]   1/4/2021  8:45 AM Toña Malin PT [facility]   1/6/2021  8:45 AM Toña Malin PT [facility]   1/11/2021  8:45 [facility]   1/12/2021  9:30 AM MITRA George [facility] AMB   1/13/2021  8:45 AM Toña Malin PT [facility]   1/18/2021  8:45 AM Toña Malin PT [facility]   1/20/2021  8:45 AM [facility]   1/25/2021  8:45 AM Ponce Riser [facility]   1/27/2021  8:45 AM Toña Malni, PT [facility]

## 2020-12-28 ENCOUNTER — HOSPITAL ENCOUNTER (OUTPATIENT)
Dept: PHYSICAL THERAPY | Age: 55
Discharge: HOME | End: 2020-12-28
Attending: ORTHOPAEDIC SURGERY
Payer: MEDICAID

## 2020-12-28 PROCEDURE — 97110 THERAPEUTIC EXERCISES: CPT

## 2020-12-28 PROCEDURE — 97112 NEUROMUSCULAR REEDUCATION: CPT

## 2020-12-28 PROCEDURE — 97530 THERAPEUTIC ACTIVITIES: CPT

## 2020-12-28 NOTE — PROGRESS NOTES
jmjmjPHYSICAL THERAPY - DAILY TREATMENT NOTE    Patient Name: Lucille Robles        Date: 2020  : 1965   yes Patient  Verified  Visit #:     Insurance: Payor: George Villegas / Plan: Umair Gonzalez / Product Type: Managed Care Medicaid /      In time: 570 Out time: 926   Total Treatment Time: 40     Medicare/BCBS Time Tracking (below)   Total Timed Codes (min):  40 1:1 Treatment Time:  40     TREATMENT AREA =  Right shoulder pain [M25.511]    SUBJECTIVE  Pain Level (on 0 to 10 scale):  2  / 10   Medication Changes/New allergies or changes in medical history, any new surgeries or procedures?    no  If yes, update Summary List   Subjective Functional Status/Changes:  []  No changes reported     \"I been ok, I need to work on getting this [address] not to hurt\"    Pt request to leave by 925 for bus back to [address] [facility]    15 min Therapeutic Exercise:  [x]  See flow sheet   Rationale:      increase ROM and increase strength to improve the patients ability to carry/lift/reach/perform ADLs without pain or difficulty       15 min Therapeutic Activity: [x]  See flow sheet  tband rows/ext with TCs for scap facilitation  AAROM SH flex/scap to promote AROM for OH activities   Rationale:    increase ROM and increase strength to improve the patients ability to carry/lift/reach/perform ADLs without pain or difficulty     10 min Neuromuscular Re-ed: [x]  See flow sheet  postural re-ed  rep c/s movement   UT/Lev scap stretching   Rationale:    increase ROM and increase strength to improve the patients ability to  perform c/s AROM for ADLs/driving, lifting, reaching, and to perform prolong sitting without pain      Billed With/As:   [x] TE   [] TA   [] Neuro   [] Self Care Patient Education: [x] Review HEP    [] Progressed/Changed HEP based on:   [x] positioning   [x] body mechanics   [x] transfers   [] heat/ice application    [x] other: Pt ed on importance and benefits of compliance with HEP, core strength/stability and proper posture; pt verbalized understanding       Other Objective/Functional Measures:  requested to leave by 925 for transportation    VCs + demo to perform proper technique for TE    Initiated TE per flowsheet with mild increase in pain    demos UT compensation with tband rows/ext; TCs for scap facilitation     Post Treatment Pain Level (on 0 to 10) scale:   0  / 10     ASSESSMENT  Assessment/Changes in Function:   c/o fatigue after 10 reps, pt ed on increasing strength/stability and endurance will improve  [facility] ER in doorway ~ 75 degs with pain at end range     []  See Progress Note/Recertification   Patient will continue to benefit from skilled PT services to modify and progress therapeutic interventions, address functional mobility deficits, address ROM deficits, address strength deficits, analyze and address soft tissue restrictions, analyze and cue movement patterns, analyze and modify body mechanics/ergonomics, assess and modify postural abnormalities and instruct in home and community integration to attain remaining goals.    Progress toward goals / Updated goals:  See PN last visit, no changes noted at this time       PLAN  [x]  Upgrade activities as tolerated yes Continue plan of care   []  Discharge due to :    []  Other:      Therapist: Darin Fong PTA    Date: 12/28/2020 Time: 8:53 AM     Future Appointments   Date Time Provider Laurie Darden   12/31/2020  8:45 AM Scarlet Santana [facility]   1/4/2021  8:45 AM Amena Gaines PT [facility]   1/6/2021  8:45 AM Amena Gaines PT [facility]   1/11/2021  8:45 AM Scarlet Santana [facility]   1/12/2021  9:30 AM MITRA Reilly [facility] AMB   1/13/2021  8:45 AM Amena Gaines PT [facility]   1/18/2021  8:45 AM Amena Gaines PT [facility]   1/20/2021  8:45 AM Scarlet Santana [facility]   1/25/2021  8:45 AM Scarlet Santana [facility]   1/27/2021  8:45 AM Amena Gaines, [place] [facility]

## 2020-12-31 ENCOUNTER — APPOINTMENT (OUTPATIENT)
Dept: PHYSICAL THERAPY | Age: 55
End: 2020-12-31
Payer: MEDICAID

## 2021-01-04 ENCOUNTER — HOSPITAL ENCOUNTER (OUTPATIENT)
Dept: PHYSICAL THERAPY | Age: 56
Discharge: HOME | End: 2021-01-04
Payer: MEDICAID

## 2021-01-04 PROCEDURE — 97110 THERAPEUTIC EXERCISES: CPT

## 2021-01-04 PROCEDURE — 97112 NEUROMUSCULAR REEDUCATION: CPT

## 2021-01-04 PROCEDURE — 97530 THERAPEUTIC ACTIVITIES: CPT

## 2021-01-04 NOTE — PROGRESS NOTES
PHYSICAL THERAPY - DAILY TREATMENT NOTE    Patient Name: Oni Lanza        Date: 2021  : 1965   YES Patient  Verified  Visit #:   15   of   22  Insurance: Payor: Cassidy Ernandez / Plan: Alia Garden / Product Type: Managed Care Medicaid /      In time: 8:10 Out time: 8:50   Total Treatment Time: 40       TREATMENT AREA = Right shoulder pain [M25.511]    SUBJECTIVE    Pain Level (on 0 to 10 scale):  2  / 10   Medication Changes/New allergies or changes in medical history, any new surgeries or procedures? NO    If yes, update Summary List   Subjective Functional Status/Changes:  []  No changes reported     \"It's only a little bit right now, and it only hurt over the weekend when I moved it up wrong. I've been doing the other exercises y'all showed me here and it helps. \"          OBJECTIVE     Therapeutic Procedures:  Min Procedure Specifics + Rationale   n/a [x]  Patient Education (performed throughout session) [x] Review HEP    [] Progressed/Changed HEP based on:   [] proper performance and advancement of Therex/TA   [] reduction in pain level    [] increased functional capacity       [] change in directional preference   16 [x] Therapeutic Exercise   [x]  See Flowsheet   Rationale: increase ROM and increase strength to improve the patients ability to participate in ADL's    16(16) [x] Therapeutic Activity   [x]  See Flowsheet  Rationale: To improve safety, proprioception, coordination, and efficiency with tasks   8(8) [x] Neuromuscular Re-ed   [x]  See Flowsheet  Rationale: increase ROM, increase strength, improve coordination, improve balance and increase proprioception  to improve the patients ability to safely participate in ADL's         Other Objective/Functional Measures:    Report of transient left UE peripheralization while performing retraction with extension. When regressed to retraction alone, patient continued to have peripheralization to upper 1/3 of left arm. Advised patient to trial retraction with left rotation, which abolished left UE symptoms. Post Treatment Pain Level (on 0 to 10) scale:   2  / 10     ASSESSMENT    Assessment/Changes in Function:       Apparent left lateral component to C/S        Patient will continue to benefit from skilled PT services to modify and progress therapeutic interventions, address functional mobility deficits, address ROM deficits, address strength deficits, analyze and address soft tissue restrictions, analyze and cue movement patterns, analyze and modify body mechanics/ergonomics and instruct in home and community integration  to attain remaining goals   Progress toward goals / Updated goals:    Compliant towards HEP     PLAN    [x]  Upgrade activities as tolerated  [x]  Update interventions per flow sheet YES Continue plan of care   []  Discharge due to :    []  Other:      Therapist: Siobhan Rcokwell \"BJ\" Clem Marshall, ZEB, Cert. MDT, Cert. DN, Cert. SMT, Dip.  Osteopractic    Date: 1/4/2021 Time: 8:14 AM     Future Appointments   Date Time Provider Laurie Darden   1/4/2021  8:45 AM Nira Denver, PT [facility]   1/6/2021  8:45 AM Nira Denver, PT [facility]   1/11/2021  8:45 AM Calvert Grey [facility]   1/12/2021  9:30 AM MITRA Elizondo [facility] BS AMB   1/13/2021  8:45 AM Nira Denver, PT [facility]   1/18/2021  8:45 AM Nira Denver, PT [facility]   1/20/2021  8:45 AM McLeanert Grey [facility]   1/25/2021  8:45 AM Ashley Grey [facility]   1/27/2021  8:45 AM Nira Denver, PT [facility]

## 2021-01-06 ENCOUNTER — HOSPITAL ENCOUNTER (OUTPATIENT)
Dept: PHYSICAL THERAPY | Age: 56
Discharge: HOME | End: 2021-01-06
Payer: MEDICAID

## 2021-01-06 PROCEDURE — 97112 NEUROMUSCULAR REEDUCATION: CPT

## 2021-01-06 PROCEDURE — 97110 THERAPEUTIC EXERCISES: CPT

## 2021-01-06 PROCEDURE — 97014 ELECTRIC STIMULATION THERAPY: CPT

## 2021-01-06 PROCEDURE — 97530 THERAPEUTIC ACTIVITIES: CPT

## 2021-01-06 NOTE — PROGRESS NOTES
PHYSICAL THERAPY - DAILY TREATMENT NOTE    Patient Name: Stella Gibson        Date: 2021  : 1965   YES Patient  Verified  Visit #:   15   of   22  Insurance: Payor: Steve Mcconnell / Plan: Pamela Cabrera / Product Type: Managed Care Medicaid /      In time: 8:40a Out time: 9:35   Total Treatment Time: 55     Medicare/[facility] Time Tracking (below)   Total Timed Codes (min):  55 1:1 Treatment Time:  40     TREATMENT AREA = Right shoulder pain [M25.511]    SUBJECTIVE    Pain Level (on 0 to 10 scale):  2  / 10   Medication Changes/New allergies or changes in medical history, any new surgeries or procedures? NO    If yes, update Summary List   Subjective Functional Status/Changes:  []  No changes reported     \"I'm ok but when I do my neck stretches I get a tingling down my left arm. \"          OBJECTIVE     Therapeutic Procedures:  Min Procedure Specifics + Rationale   n/a [x]  Patient Education (performed throughout session) [x] Review HEP    [] Progressed/Changed HEP based on:   [] proper performance and advancement of Therex/TA   [] reduction in pain level    [] increased functional capacity       [] change in directional preference   15 [x] Therapeutic Exercise   [x]  See Flowsheet   Rationale: increase ROM and increase strength to improve the patients ability to participate in ADL's    15(15) [x] Therapeutic Activity   [x]  See Flowsheet  Rationale:  To improve safety, proprioception, coordination, and efficiency with tasks   10(10) [x] Neuromuscular Re-ed   [x]  See Flowsheet  Rationale: increase ROM, increase strength, improve coordination, improve balance and increase proprioception  to improve the patients ability to safely participate in ADL's     Modality rationale: decrease inflammation, decrease pain, increase tissue extensibility and increase muscle contraction/control to improve the patients ability to perform ADL's with greater ease     Min Type Additional Details   15 [x] E-Stim Type:IFC  Attended? NO Location: right shoulder  [] supine              [] prone  [] legs elevated   [] legs flat  []w/heat  []w/ice  [] sitting   [] Vasopneumatic Device    [x] Skin assessment post-treatment:  [x]intact [x]redness- no adverse reaction       []redness  adverse reaction:     Other Objective/Functional Measures:    Increased reps/sets/resistance per flow sheet. Resumed open book     Post Treatment Pain Level (on 0 to 10) scale:   0  / 10     ASSESSMENT    Assessment/Changes in Function:       Notable lack of right T/S rotation  No peripheralization of symptoms with treatment progression indicating reduction in C/S component          Patient will continue to benefit from skilled PT services to modify and progress therapeutic interventions, address functional mobility deficits, address ROM deficits, address strength deficits, analyze and address soft tissue restrictions, analyze and cue movement patterns, analyze and modify body mechanics/ergonomics and instruct in home and community integration  to attain remaining goals   Progress toward goals / Updated goals:    Progressing in strength. PLAN    [x]  Upgrade activities as tolerated  [x]  Update interventions per flow sheet YES Continue plan of care   []  Discharge due to :    []  Other:      Therapist: Fredy Perry \"BJ\" ZEB Smith, Cert. MDT, Cert. DN, Cert. SMT, Dip.  Osteopractic    Date: 1/6/2021 Time: 8:46 AM     Future Appointments   Date Time Provider Laurie Darden   1/11/2021  8:45 AM Olden Budd [facility]   1/12/2021  9:30 AM MITRA Mccoy [facility] AMB   1/13/2021  8:45 AM Lan Gibbs PT [facility]   1/18/2021  8:45 AM Lan Gibbs PT [facility]   1/20/2021  8:45 AM Olden Budd [facility]   1/25/2021  8:45 AM Olden Budd [facility]   1/27/2021  8:45 AM Lan Gibbs PT [facility]   2/2/2021 12:30 PM JEY Santana AMB

## 2021-01-11 ENCOUNTER — HOSPITAL ENCOUNTER (OUTPATIENT)
Dept: PHYSICAL THERAPY | Age: 56
Discharge: HOME | End: 2021-01-11
Payer: MEDICAID

## 2021-01-11 PROCEDURE — 97110 THERAPEUTIC EXERCISES: CPT

## 2021-01-11 PROCEDURE — 97530 THERAPEUTIC ACTIVITIES: CPT

## 2021-01-11 PROCEDURE — 97032 APPL MODALITY 1+ESTIM EA 15: CPT

## 2021-01-11 PROCEDURE — 97112 NEUROMUSCULAR REEDUCATION: CPT

## 2021-01-11 NOTE — PROGRESS NOTES
jmjmjPHYSICAL THERAPY - DAILY TREATMENT NOTE    Patient Name: Gio Babin        Date: 2021  : 1965   yes Patient  Verified  Visit #:   15   of   22  Insurance: Payor: [employer] / Plan[place] [employer] / Product Type: Managed Care Medicare /      In time: 666 Out time: 950   Total Treatment Time: 64     Medicare/[employer] Time Tracking (below)   Total Timed Codes (min): 49 1:1 Treatment Time:  52   94TREATMENT AREA =  Right shoulder pain [M25.511]    SUBJECTIVE  Pain Level (on 0 to 10 scale):  3- 10   Medication Changes/New allergies or changes in medical history, any new surgeries or procedures?    no  If yes, update Summary List   Subjective Functional Status/Changes:  []  No changes reported     \"it has been jumping since last time I was here.  When I was lying on the bed with the e-stem it was jumping so much my hand was jumping      OBJECTIVE  Modalities Rationale:     decrease inflammation and decrease pain to improve patient's ability to carry/lift/reach/perform ADLs without pain or difficulty     15 min [x] Estim, type/location: IFC to right SH in upright position                                      []  att     [x]  unatt     []  w/US     [x]  w/ice    []  w/heat    min []  Mechanical Traction: type/lbs                   []  pro   []  sup   []  int   []  cont    []  before manual    []  after manual    min []  Ultrasound, settings/location:      min []  Iontophoresis w/ dexamethasone, location:                                               []  take home patch       []  in clinic    min []  Ice     []  Heat    location/position:     min []  Vasopneumatic Device, press/temp:     min []  Other:    [] Skin assessment post-treatment (if applicable):    []  intact    []  redness- no adverse reaction     []redness  adverse reaction:        19 min Therapeutic Exercise:  [x]  See flow sheet   Rationale:      increase ROM and increase strength to improve the patients ability to carry/lift/reach/perform ADLs without pain or difficulty       10 min Therapeutic Activity: [x]  See flow sheet  tband rows/ext with TCs for scap facilitation  postural training   Rationale:    increase ROM and increase strength to improve the patients ability to carry/lift/reach/perform ADLs without pain or difficulty     20 min Neuromuscular Re-ed: [x]  See flow sheet  postural re-ed  doorway str  rep sh ext  rep c/s movement   UT/Lev scap stretching   Rationale:    increase ROM and increase strength to improve the patients ability to  perform c/s AROM for ADLs/driving, lifting, reaching, and to perform prolong sitting without pain      Billed With/As:   [x] TE   [] TA   [] Neuro   [] Self Care Patient Education: [x] Review HEP    [] Progressed/Changed HEP based on:   [x] positioning   [x] body mechanics   [x] transfers   [] heat/ice application    [x] other: Pt ed on importance and benefits of compliance with HEP, core strength/stability and proper posture; pt verbalized understanding  See updated HEP in chart  Instructed pt to apply ice >/=every 1-2x hours to manage pain/inflammation; pt verbalized understanding     Other Objective/Functional Measures:  pt ed on DOMS and the benefits of CP to area with elevated sxs    pt with fwd head and shoulders and increased mm guarding with pain, pt ed to stop leaning forward with pain to sit upright and to not give into pain; pt verbalized understanding     VCs + demo to perform proper technique for TE  initiated rep sh ext, and t/s rot with improved sxs after completing   held TE per flow sheet due to elevated pain   Post Treatment Pain Level (on 0 to 10) scale:   0  / 10     ASSESSMENT  Assessment/Changes in Function:   limited by pain today  pt with muscle twitching in supine position, pt instructed to lie supine in neutral position to promote ease in position    []  See Progress Note/Recertification   Patient will continue to benefit from skilled PT services to modify and progress therapeutic interventions, address functional mobility deficits, address ROM deficits, address strength deficits, analyze and address soft tissue restrictions, analyze and cue movement patterns, analyze and modify body mechanics/ergonomics, assess and modify postural abnormalities and instruct in home and community integration to attain remaining goals. Progress toward goals / Updated goals:  · Goals for this certification period include and are to be achieved in   3-4  weeks from 12/21/2020:  1. Patient will centralize right UE radicular symptoms to the C/S to decrease risk of C/S dysfunction. intermittent radic sxs  2. Patient will be independent with an updated HEP to improve carryover upon discharge. 3.   Patient will demonstrate at least 150 degrees abduction AROM on the right UE to improve efficiency with dressing ADL's.  4.   Patient will demosntrate at last 70 degrees of C/S rotation AROM bilaterally to improve safety and efficiency while driving.      PLAN  [x]  Upgrade activities as tolerated yes Continue plan of care   []  Discharge due to :    []  Other:      Therapist: Ana Laura Muse PTA    Date: 1/11/2021 Time: 10:01 AM     Future Appointments   Date Time Provider Laurie Darden   1/12/2021  9:30 AM Colen Eisenmenger, PA [facility] BS AMB   1/13/2021  8:45 AM Vika Bergeron PT [facility]   1/18/2021  8:45 AM Vika Bergeron PT [facility]   1/20/2021  8:45 AM Mickey Myriam [facility]   1/25/2021  8:45 AM Kennethll [facility]   1/27/2021  8:45 AM Vika Bergeron PT [facility]   2/2/2021 12:30 PM Moody Aggarwal NP [place] [facility] AMB

## 2021-01-13 ENCOUNTER — HOSPITAL ENCOUNTER (OUTPATIENT)
Dept: PHYSICAL THERAPY | Age: 56
End: 2021-01-13
Payer: MEDICAID

## 2021-01-18 ENCOUNTER — HOSPITAL ENCOUNTER (OUTPATIENT)
Dept: PHYSICAL THERAPY | Age: 56
Discharge: HOME | End: 2021-01-18
Payer: MEDICAID

## 2021-01-18 PROCEDURE — 97110 THERAPEUTIC EXERCISES: CPT

## 2021-01-18 PROCEDURE — 97016 VASOPNEUMATIC DEVICE THERAPY: CPT

## 2021-01-18 PROCEDURE — 97112 NEUROMUSCULAR REEDUCATION: CPT

## 2021-01-18 PROCEDURE — 97530 THERAPEUTIC ACTIVITIES: CPT

## 2021-01-18 NOTE — PROGRESS NOTES
PHYSICAL THERAPY - DAILY TREATMENT NOTE    Patient Name: Abril Amin        Date: 2021  : 1965   YES Patient  Verified  Visit #:     Insurance: Payor: Lynnette Bills / Plan: [address] / Product Type: Managed Care Medicaid /      In time: 8:35 Out time: 9:30   Total Treatment Time: 55       TREATMENT AREA = Right shoulder pain [M25.511]    SUBJECTIVE    Pain Level (on 0 to 10 scale):  2  / 10   Medication Changes/New allergies or changes in medical history, any new surgeries or procedures? NO    If yes, update Summary List   Subjective Functional Status/Changes:  []  No changes reported     \"I'm glad I didn't stay last time. I got better over the weekend. I don't have any of those symptoms anymore. I'm feeling pretty good in the shoulder. \"          OBJECTIVE     Therapeutic Procedures:  Min Procedure Specifics + Rationale   n/a [x]  Patient Education (performed throughout session) [x] Review HEP    [] Progressed/Changed HEP based on:   [] proper performance and advancement of Therex/TA   [] reduction in pain level    [] increased functional capacity       [] change in directional preference   15 [x] Therapeutic Exercise   [x]  See Flowsheet   Rationale: increase ROM and increase strength to improve the patients ability to participate in ADL's      15 min Therapeutic Activity: See note   Rationale:    improve coordination        10 min Neuromuscular Re-ed: See note   Rationale:  Improve reflexive stability and motor control.          Modality rationale: decrease inflammation, decrease pain, increase tissue extensibility and increase muscle contraction/control to improve the patients ability to perform ADL's with greater ease     Min Type Additional Details   15 [x]  Vasopneumatic Device  [] pre-STEVEN      [x] post-STEVEN  []  Ice massage Location:right shoulder    [] supine              [] prone  [] legs elevated    [] legs flat   [x] sitting   [x] Skin assessment post-treatment:  [x]intact [x]redness- no adverse reaction       []redness  adverse reaction:     Other Objective/Functional Measures:    Educated patient re: [employer]  Patient reported right wrist discomfort with pronation/supination modified therex as necessary to maintain neutral      Post Treatment Pain Level (on 0 to 10) scale:   4  / 10     ASSESSMENT    Assessment/Changes in Function:       Responded well to Vasopneumatic Device         Patient will continue to benefit from skilled PT services to modify and progress therapeutic interventions, address functional mobility deficits, address ROM deficits, address strength deficits, analyze and address soft tissue restrictions, analyze and cue movement patterns, analyze and modify body mechanics/ergonomics and instruct in home and community integration  to attain remaining goals   Progress toward goals / Updated goals:    Progressing in strength in therex below shoulder height     PLAN    [x]  Upgrade activities as tolerated  [x]  Update interventions per flow sheet YES Continue plan of care   []  Discharge due to :    []  Other:      Therapist: Mone Comment \"BJ\" [facility], DPT, Cert. MDT, Cert. DN, Cert. SMT, Dip.  Osteopractic    Date: 1/18/2021 Time: 8:39 AM     Future Appointments   Date Time Provider Laurie Darden   1/18/2021  8:45 AM Delfino Enciso PT [facility]   1/20/2021  8:45 AM Phillip Frizzle [facility]   1/25/2021  8:45 AM Phillip Frizzle [facility]   1/26/2021  9:30 AM MITRA Garrido [facility] BS AMB   1/27/2021  8:45 AM Delfino Enciso PT [facility]   2/2/2021 12:30 PM Hannah Mckeon NP [place] BS AMB

## 2021-01-20 ENCOUNTER — HOSPITAL ENCOUNTER (OUTPATIENT)
Dept: PHYSICAL THERAPY | Age: 56
Discharge: HOME | End: 2021-01-20
Payer: MEDICAID

## 2021-01-20 PROCEDURE — 97530 THERAPEUTIC ACTIVITIES: CPT

## 2021-01-20 PROCEDURE — 97112 NEUROMUSCULAR REEDUCATION: CPT

## 2021-01-20 PROCEDURE — 97110 THERAPEUTIC EXERCISES: CPT

## 2021-01-20 NOTE — PROGRESS NOTES
[facility] PHYSICAL THERAPY  [address] [place] [address] Anisa Camarena, [address] - Phone: [phone]  Fax: [phone] [address]          Patient Name: Franck Damico : 1965   Treatment/Medical Diagnosis: Right shoulder pain [M25.511]   Onset Date: DOS 2020    Referral Source: Don Pal* Start of Care [facility]): 2020   Prior Hospitalization: See Medical History Provider #:  [id number]   Prior Level of Function: Independent with ADL's   Comorbidities: Depression, DM, HTN, asthma   Medications: Verified on Patient Summary List   Visits from [facility]: 17 Missed Visits:      Goal/Measure of Progress Goal Met? 1. Patient will be independent with an updated HEP to improve carryover upon discharge. Status at last Eval: Progressin Current Status: Progressing Progressing    2. Patient will centralize right UE radicular symptoms to the C/S to decrease risk of C/S dysfunction. Status at last Eval: Left hand tingling Current Status: No change no   3. Patient will demonstrate at least 150 degrees abduction AROM on the right UE to improve efficiency with dressing ADL's. Status at last Eval: 130 Current Status: 166 yes   4. Patient will demosntrate at last 70 degrees of C/S rotation AROM bilaterally to improve safety and efficiency while driving. Status at last Eval: 70 right, 54 left Current Status: 70 right, 88 left yes     Patient's POC has consisted of therex, therapeutic activities, manual therapy prn, modalities prn, pt. education, and a comprehensive HEP. Treatment strategies used to address functional mobility deficits, ROM deficits, strength deficits, analyze and address soft tissue restrictions, analyze and cue movement patterns, analyze and modify body mechanics/ergonomics, assess and modify postural abnormalities and instruct in home and community integration.     Key Functional Changes/Progress: The pt has progressed AROM in all planes of UE and C/S AROM. See below for more details. The pt does requires cueing to perform UE exercise and AROM with appropriate spinal alignment to decrease impingement and associated pain. The patient is still limted to 3/5 shoulder flexion, abduction and 3+/5 ER MMT. RIGHT shoulder AROM (degrees)  Flexion  168  Abduction  165  ER   60  IR HBB  T11    C/S AROM (degrees)  Flexion  WFL  Extension  40  Lateral Flexion 42 right, 45 left  Rotation  70 right, 88 left    Problem List: pain affecting function, decrease ROM, decrease strength, edema affecting function, impaired gait/ balance, decrease ADL/ functional abilitiies, decrease activity tolerance, decrease flexibility/ joint mobility and decrease transfer abilities   Treatment Plan may include any combination of the following: Therapeutic exercise, Therapeutic activities, Neuromuscular re-education, Physical agent/modality, Manual therapy, Patient education and Self Care training      Goals for this certification period include and are to be achieved in   3-4  weeks:  1. Patient will be independent with an updated HEP to improve carryover upon discharge. 2.   Patient will demonstrate IR HBB to T6 to improve efficiency with dressing ADL's.  3.   Patient will demonstrate at least 4/5 shoulder flexion MMT to improve stability with overhead ADL's. Frequency / Duration:   Patient to be seen   1-2   times per week for   4-6    weeks:    Assessments/Recommendations: The patient would continue to benefit from skilled interventions to address the above mentioned functional deficits. See above for more details. If you have any questions/comments please contact us directly at [phone]. Thank you for allowing us to assist in the care of your patient.     Therapist Signature: Melvin Beal DPT Date: 9/73/2173   Certification Period:  Reporting Period: NA  12/21/2020 - 1/20/2021 Time: 8:53 AM   NOTE TO PHYSICIAN:  PLEASE COMPLETE THE ORDERS BELOW AND FAX TO   [facility]: [phone]. If you are unable to process this request in 24 hours please contact our office: [phone].    ___ I have read the above report and request that my patient continue as recommended.   ___ I have read the above report and request that my patient continue therapy with the following changes/special instructions: ________________________________________________   ___ I have read the above report and request that my patient be discharged from therapy.      Physician Signature:        Date:       Time:

## 2021-01-20 NOTE — PROGRESS NOTES
PHYSICAL THERAPY - DAILY TREATMENT NOTE    Patient Name: Brandon Gonzalez        Date: 2021  : 1965   YES Patient  Verified  Visit #:     Insurance: Payor: Lc Palafox / Plan: [address] [place] [address] / Product Type: Managed Care Medicaid /      In time: 8:48 Out time: 9:32   Total Treatment Time: 44     Medicare/BCBS Time Tracking (below)   Total Timed Codes (min):  NA 1:1 Treatment Time:  NA     TREATMENT AREA =  Right shoulder pain [M25.511]    SUBJECTIVE    Pain Level (on 0 to 10 scale):  2  / 10   Medication Changes/New allergies or changes in medical history, any new surgeries or procedures? NO     If yes, update Summary List   Subjective Functional Status/Changes:  []  No changes reported     \"I was killer sore after the game ready. I did not like that at all. \"          OBJECTIVE    Modalities Rationale: To improve activity tolerance for exercise performance and ADL's.    min [] Estim, type/location:                                      []  att     []  unatt     []  w/US     []  w/ice    []  w/heat    min []  Mechanical Traction: type/lbs                   []  pro   []  sup   []  int   []  cont    []  before manual    []  after manual    min []  Ultrasound, settings/location:      min []  Iontophoresis w/ dexamethasone, location:                                               []  take home patch       []  in clinic   NA min []  Ice     []  Heat    location/position:     min []  Vasopneumatic Device, press/temp:     min []  Other:    [x] Skin assessment post-treatment (if applicable):   [x]  intact    []  redness- no adverse reaction     []redness  adverse reaction:    22 min Therapeutic Exercise:  [x]  See flow sheet   Rationale:      increase ROM and increase strength to improve the patients ability to perform ADL's with improved periscapular mobility. 12 min Therapeutic Activity: Wall slides, wall push-up's   Rationale:    To increase safety and efficiency with ADL's.    10 min Neuromuscular Reeducation: Pulleys flexion, abduction, IR   Rationale:      decrease pain, increase ROM and increase tissue extensibility to improve patient's ability to improve overhead ADL performance.     min Patient Education:  YES  Reviewed HEP   []  Progressed/Changed HEP based on:   Patient reports compliance     Other Objective/Functional Measures:    See progress note.     Post Treatment Pain Level (on 0 to 10) scale:   0  / 10     ASSESSMENT    Assessment/Changes in Function:     See progress note.   []  See Progress Note/Recertification   Patient will continue to benefit from skilled PT services to modify and progress therapeutic interventions, address functional mobility deficits, address ROM deficits, address strength deficits, analyze and address soft tissue restrictions, analyze and cue movement patterns, analyze and modify body mechanics/ergonomics and assess and modify postural abnormalities to attain remaining goals.   Progress toward goals / Updated goals:    Addressed HEP independence with issued and reviewed updated HEP.     PLAN    [x]  Upgrade activities as tolerated YES Continue plan of care   []  Discharge due to :    []  Other:      Therapist: Trang Francis    Date: 1/20/2021 Time: 12:32 PM     Future Appointments   Date Time Provider Department Center   1/25/2021  8:45 AM Trang Francis [facility]   1/26/2021  9:30 AM Anni Enamorado PA [facility] BS AMB   1/27/2021  8:45 AM Javan Smith PT [facility]   2/2/2021 12:30 PM Rox Sorenson, NP AM BS AMB

## 2021-01-25 ENCOUNTER — HOSPITAL ENCOUNTER (OUTPATIENT)
Dept: PHYSICAL THERAPY | Age: 56
Discharge: HOME | End: 2021-01-25
Payer: MEDICAID

## 2021-01-25 PROCEDURE — 97112 NEUROMUSCULAR REEDUCATION: CPT

## 2021-01-25 PROCEDURE — 97110 THERAPEUTIC EXERCISES: CPT

## 2021-01-25 PROCEDURE — 97530 THERAPEUTIC ACTIVITIES: CPT

## 2021-01-25 NOTE — PROGRESS NOTES
PHYSICAL THERAPY - DAILY TREATMENT NOTE    Patient Name: Saran Mcqueen        Date: 2021  : 1965   YES Patient  Verified  Visit #:      Insurance: Payor: Lexisjan Jasson / Plan: [address] [place] [address] / Product Type: Managed Care Medicaid /      In time: 8:43 Out time: 9:25   Total Treatment Time: 42     Medicare/BCBS Time Tracking (below)   Total Timed Codes (min):  NA 1:1 Treatment Time:  NA     TREATMENT AREA =  Right shoulder pain [M25.511]    SUBJECTIVE    Pain Level (on 0 to 10 scale):  2  / 10   Medication Changes/New allergies or changes in medical history, any new surgeries or procedures? NO     If yes, update Summary List   Subjective Functional Status/Changes:  []  No changes reported     \"It still aches at the top of my shoulder when I open a jar. I think that bothers me the most.\"          OBJECTIVE    Modalities Rationale: To improve activity tolerance for exercise performance and ADL's.    min [] Estim, type/location:                                      []  att     []  unatt     []  w/US     []  w/ice    []  w/heat    min []  Mechanical Traction: type/lbs                   []  pro   []  sup   []  int   []  cont    []  before manual    []  after manual    min []  Ultrasound, settings/location:      min []  Iontophoresis w/ dexamethasone, location:                                               []  take home patch       []  in clinic   NA min []  Ice     []  Heat    location/position:     min []  Vasopneumatic Device, press/temp:     min []  Other:    [x] Skin assessment post-treatment (if applicable):   [x]  intact    []  redness- no adverse reaction     []redness  adverse reaction:       20 min Therapeutic Exercise:  [x]? See flow sheet   Rationale:      increase ROM and increase strength to improve the patients ability to perform ADL's with improved periscapular mobility.      12 min Therapeutic Activity: Wall slides, wall push-up's   Rationale:    To increase safety and efficiency with ADL's.     10 min Neuromuscular Reeducation: Pulleys flexion, abduction, IR   Rationale:      decrease pain, increase ROM and increase tissue extensibility to improve patient's ability to improve overhead ADL performance.        min Patient Education:  YES  Reviewed HEP   []  Progressed/Changed HEP based on:   Patient reports compliance     Other Objective/Functional Measures:    Pt can perform standing shoulder AROM prior to sleeper stretch with improved AROM over 165 degrees each shoulder flexion and abduction. Pt can perform ER to 65 degrees in sidelying but can only ER up to 40 degrees with TB resistance or 2# weight. Pt symptoms abated with initiated shoulder wand extensions. See flowsheet for more details. Post Treatment Pain Level (on 0 to 10) scale:   0  / 10     ASSESSMENT    Assessment/Changes in Function:     WIll continue to progress strengthening/AROM per activity tolerance. []  See Progress Note/Recertification   Patient will continue to benefit from skilled PT services to modify and progress therapeutic interventions, address functional mobility deficits, address ROM deficits, address strength deficits, analyze and address soft tissue restrictions, analyze and cue movement patterns, analyze and modify body mechanics/ergonomics and assess and modify postural abnormalities to attain remaining goals. Progress toward goals / Updated goals: Addressed shoulder flexion MMT with shoulder 3-way and initiated dragon punch.      PLAN    [x]  Upgrade activities as tolerated YES Continue plan of care   []  Discharge due to :    []  Other:      Therapist: Kristen Philip    Date: 1/25/2021 Time: 9:26 AM     Future Appointments   Date Time Provider Laurie Darden   1/26/2021  9:30 AM MITRA [facility] BS AMB   1/27/2021  8:45 AM Veda Eaton PT [facility]   2/2/2021 12:30 PM JEY Sharma BS AMB

## 2021-01-26 ENCOUNTER — OFFICE VISIT (OUTPATIENT)
Dept: ORTHOPEDIC SURGERY | Age: 56
End: 2021-01-26
Payer: MEDICAID

## 2021-01-26 VITALS
DIASTOLIC BLOOD PRESSURE: 88 MMHG | OXYGEN SATURATION: 97 % | SYSTOLIC BLOOD PRESSURE: 135 MMHG | BODY MASS INDEX: 40.56 KG/M2 | WEIGHT: 237.6 LBS | HEIGHT: 64 IN | HEART RATE: 102 BPM | TEMPERATURE: 96.9 F

## 2021-01-26 DIAGNOSIS — S46.011A TRAUMATIC COMPLETE TEAR OF RIGHT ROTATOR CUFF, INITIAL ENCOUNTER: Primary | ICD-10-CM

## 2021-01-26 DIAGNOSIS — Z98.890 STATUS POST ARTHROSCOPY OF RIGHT SHOULDER: ICD-10-CM

## 2021-01-26 PROCEDURE — 99213 OFFICE O/P EST LOW 20 MIN: CPT | Performed by: ORTHOPAEDIC SURGERY

## 2021-01-26 NOTE — PROGRESS NOTES
Marybeth Weeks  1965     HISTORY OF PRESENT ILLNESS  Michelle Enciso is a 50 y.o. female who presents today for evaluation S/P Right shoulder arthroscopic rotator cuff repair, biceps tenotomy and glenohumeral debridment on 7/13/20. Patient has been going to PT. Describes pain as a 2/10. Has been taking nothing for pain. Still has night pain. She has been compliant with her exercises and restrictions. She remembers pushing through pain with lifting 2-3 lbs in front of her and performing external rotation with bands. The pain is sharp and achy located to the anterior aspect of her shoulder. She notices the pain more when she is turning her wrist. It is worse after her exercises but she take OTC pain medication and it improves. Patient denies any fever, chills, chest pain, shortness of breath or calf pain. There are no new illness or injuries to report since last seen in the office. PHYSICAL EXAM:   Visit Vitals  /88 (BP 1 Location: Left arm)   Pulse (!) 102   Temp 96.9 °F (36.1 °C) (Temporal)   Ht 5' 4\" (1.626 m)   Wt 237 lb 9.6 oz (107.8 kg)   SpO2 97%   BMI 40.78 kg/m²      The patient is a well-developed, well-nourished female in no acute distress. The patient is alert and oriented times three. The patient appears to be well groomed. Mood and affect are normal.  ORTHOPEDIC EXAM of right shoulder:  Inspection: swelling not present,  Bruising not present  Incision well healed  Passive glenohumeral abduction 0-90, 180 PFF, 180 AFF, 70 PER, IR lower lumbar  Stability: Stable  Strength: gentle rotator cuff testing without pain or weakness  2+ distal pulses    IMPRESSION:  S/P Right shoulder arthroscopic rotator cuff repair, biceps tenotomy and glenohumeral debridment    PLAN:   Pt doing well post operatively  Her ROM looks great today. I think her anterior shoulder pain is scar tissue from the biceps tenotomy. This should get better with time.   She will continue volteran gel and diclofenac to help with discomfort. Will continue PT and exercises to work on ROM and continue gentle strengthening. Another order placed today. Stressed to patient that nothing causes an increase in pain. Pt not given a refill of pain medication today.    RTC 6 weeks    MIREYA Islas [facility]

## 2021-01-27 ENCOUNTER — HOSPITAL ENCOUNTER (OUTPATIENT)
Dept: PHYSICAL THERAPY | Age: 56
Discharge: HOME | End: 2021-01-27
Payer: MEDICAID

## 2021-01-27 PROCEDURE — 97112 NEUROMUSCULAR REEDUCATION: CPT

## 2021-01-27 PROCEDURE — 97110 THERAPEUTIC EXERCISES: CPT

## 2021-01-27 PROCEDURE — 97530 THERAPEUTIC ACTIVITIES: CPT

## 2021-01-27 NOTE — PROGRESS NOTES
PHYSICAL THERAPY - DAILY TREATMENT NOTE    Patient Name: Marlene Valdez        Date: 2021  : 1965   YES Patient  Verified  Visit #:     Insurance: Payor: Destiney Simmons / Plan: [address] / Product Type: Managed Care Medicaid /      In time: 8:40 Out time: 9:20   Total Treatment Time: 40       TREATMENT AREA = Right shoulder pain [M25.511]    SUBJECTIVE    Pain Level (on 0 to 10 scale):  4  / 10   Medication Changes/New allergies or changes in medical history, any new surgeries or procedures? NO    If yes, update Summary List   Subjective Functional Status/Changes:  []  No changes reported     \"A little more sore today, I think it's the weather. I saw the doctor the other day and she said I was doing real good. \"          OBJECTIVE     Therapeutic Procedures:  Min Procedure Specifics + Rationale   n/a [x]  Patient Education (performed throughout session) [x] Review HEP    [] Progressed/Changed HEP based on:   [] proper performance and advancement of Therex/TA   [] reduction in pain level    [] increased functional capacity       [] change in directional preference   15 [x] Therapeutic Exercise   [x]  See Flowsheet   Rationale: increase ROM and increase strength to improve the patients ability to participate in ADL's    15(15) [x] Therapeutic Activity   [x]  See Flowsheet  Rationale: To improve safety, proprioception, coordination, and efficiency with tasks   10(10) [x] Neuromuscular Re-ed   [x]  See Flowsheet  Rationale: increase ROM, increase strength, improve coordination, improve balance and increase proprioception  to improve the patients ability to safely participate in ADL's       [x] Skin assessment post-treatment:  [x]intact [x]redness- no adverse reaction       []redness - adverse reaction:     Other Objective/Functional Measures:    Min VC's to perform therex correctly  Increased reps/sets/resistance per flow sheet.        Post Treatment Pain Level (on 0 to 10) scale:   0  / 10     ASSESSMENT    Assessment/Changes in Function:       Responding well to current POC as patient reports significant reduction in symptoms as she progresses through treatment. Patient will continue to benefit from skilled PT services to modify and progress therapeutic interventions, address functional mobility deficits, address ROM deficits, address strength deficits, analyze and address soft tissue restrictions, analyze and cue movement patterns, analyze and modify body mechanics/ergonomics and instruct in home and community integration  to attain remaining goals   Progress toward goals / Updated goals:    Progressing well in strength     PLAN    [x]  Upgrade activities as tolerated  [x]  Update interventions per flow sheet YES Continue plan of care   []  Discharge due to :    []  Other:      Therapist: Andra Horowitz \"BJ\" ZEB Smith, Cert. MDT, Cert. DN, Cert. SMT, Dip.  Osteopractic    Date: 1/27/2021 Time: 8:56 AM     Future Appointments   Date Time Provider Laurie Darden   2/2/2021 12:30 PM Kavin Acosta NP [facility] BS AMB   2/3/2021  8:45 AM Mary Franks PTA [facility]   2/8/2021  9:30 AM Fredie Peter [facility]   2/10/2021  9:30 AM Fredie Peter [facility]   2/15/2021  9:30 AM Mary Franks PTA [facility]   2/17/2021  9:30 AM Mary Franks PTA [facility]   2/22/2021  9:30 AM Mary Franks PTA [facility]   2/25/2021  8:00 AM Fredie Peter [facility]   3/9/2021  9:30 AM MITRA Hoffamn BS AMB

## 2021-02-03 ENCOUNTER — HOSPITAL ENCOUNTER (OUTPATIENT)
Dept: PHYSICAL THERAPY | Age: 56
Discharge: HOME | End: 2021-02-03
Payer: MEDICAID

## 2021-02-03 PROCEDURE — 97110 THERAPEUTIC EXERCISES: CPT

## 2021-02-03 PROCEDURE — 97530 THERAPEUTIC ACTIVITIES: CPT

## 2021-02-03 PROCEDURE — 97112 NEUROMUSCULAR REEDUCATION: CPT
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
## 2021-02-08 ENCOUNTER — HOSPITAL ENCOUNTER (OUTPATIENT)
Dept: PHYSICAL THERAPY | Age: 56
Discharge: HOME | End: 2021-02-08
Payer: MEDICAID

## 2021-02-08 PROCEDURE — 97110 THERAPEUTIC EXERCISES: CPT

## 2021-02-08 PROCEDURE — 97530 THERAPEUTIC ACTIVITIES: CPT

## 2021-02-08 PROCEDURE — 97112 NEUROMUSCULAR REEDUCATION: CPT

## 2021-02-08 NOTE — PROGRESS NOTES
jmjmjPHYSICAL THERAPY - DAILY TREATMENT NOTE    Patient Name: Hattie Donovan        Date: 2021  : 1965   yes Patient  Verified  Visit #:     Insurance: Payor: [address] / Plan: [address] / Product Type: Managed Care Medicaid /      In time:  401 Out time: 926   Total Treatment Time: 41     Medicare/BCBS Time Tracking (below)   Total Timed Codes (min):  41 1:1 Treatment Time:   41   94TREATMENT AREA =  Right shoulder pain [M25.511]    SUBJECTIVE  Pain Level (on 0 to 10 scale): 0 / 10   Medication Changes/New allergies or changes in medical history, any new surgeries or procedures?    no  If yes, update Summary List   Subjective Functional Status/Changes:  []  No changes reported     \"I felt ok after last time.  It was hurting some last night bc I was busy with groceries\"      OBJECTIVE       11 min Therapeutic Exercise:  [x]  See flow sheet   Rationale:      increase ROM and increase strength to improve the patients ability to carry/lift/reach/perform ADLs without pain or difficulty       15 min Therapeutic Activity: [x]  See flow sheet  tband rows/ext with TCs for scap facilitation  wall push ups  wall slides   pulleys IR   Rationale:    increase ROM and increase strength to improve the patients ability to carry/lift/reach/perform ADLs without pain or difficulty     15 min Neuromuscular Re-ed: [x]  See flow sheet  postural re-ed  doorway str  rep sh ext  rep c/s ext   sleepers str   Rationale:    increase ROM and increase strength to improve the patients ability to  perform c/s AROM for ADLs/driving, lifting, reaching, and to perform prolong sitting without pain      Billed With/As:   [x] TE   [] TA   [] Neuro   [] Self Care Patient Education: [x] Review HEP    [] Progressed/Changed HEP based on:   [x] positioning   [x] body mechanics   [x] transfers   [] heat/ice application    [x] other: Pt ed on importance and benefits of compliance with HEP, core strength/stability and proper posture; pt verbalized understanding       Other Objective/Functional Measures:  VCs + demo to perform proper technique for TE     demos UT compensation and min pain with tband ER, TCs/VCs to perform without elevation to minimize impingement     Sh AAROM abd at wall nearly full    Post Treatment Pain Level (on 0 to 10) scale:  0/ 10     ASSESSMENT  Assessment/Changes in Function:   no limitations today  continues to have min pain at [facility] AROM/AAROM end range   []  See Progress Note/Recertification   Patient will continue to benefit from skilled PT services to modify and progress therapeutic interventions, address functional mobility deficits, address ROM deficits, address strength deficits, analyze and address soft tissue restrictions, analyze and cue movement patterns, analyze and modify body mechanics/ergonomics, assess and modify postural abnormalities and instruct in home and community integration to attain remaining goals. Progress toward goals / Updated goals:  · Goals for this certification period include and are to be achieved in   3-4  weeks from 1/20/21:  1. Patient will be independent with an updated HEP to improve carryover upon discharge.    2. Patient will demonstrate IR HBB to T6 to improve efficiency with dressing ADL's.progressing, T7 with pain  3. Patient will demonstrate at least 4/5 shoulder flexion MMT to improve stability with overhead ADL's.  MET- 4/5      PLAN  [x]  Upgrade activities as tolerated yes Continue plan of care   []  Discharge due to :    []  Other:      Therapist: Fred Paul PTA    Date: 2/8/2021 Time: 8:32 AM     Future Appointments   Date Time Provider Laurie Darden   2/8/2021  8:45 AM Anne Rashid PTA [facility]   2/10/2021  9:30 AM Stephan Douglas [facility]   2/15/2021  9:30 AM Anne Rashid PTA [facility]   2/17/2021  9:30 AM Anne Rashid PTA [facility]   2/22/2021  9:30 AM Anne Rashid PTA [facility]   2/25/2021 8:[facility]   3/9/2021  9:30 AM MITRA May [facility] BS AMB   3/16/2021 12:30 PM Dhruv Seaman NP [place] BS AMB

## 2021-02-10 ENCOUNTER — HOSPITAL ENCOUNTER (OUTPATIENT)
Dept: PHYSICAL THERAPY | Age: 56
Discharge: HOME | End: 2021-02-10
Payer: MEDICAID

## 2021-02-10 PROCEDURE — 97530 THERAPEUTIC ACTIVITIES: CPT

## 2021-02-10 PROCEDURE — 97110 THERAPEUTIC EXERCISES: CPT

## 2021-02-10 PROCEDURE — 97112 NEUROMUSCULAR REEDUCATION: CPT

## 2021-02-10 NOTE — PROGRESS NOTES
PHYSICAL THERAPY - DAILY TREATMENT NOTE    Patient Name: Matilde Flores        Date: 2/10/2021  : 1965   YES Patient  Verified  Visit #:     Insurance: Payor: [address] / Plan: [address] / Product Type: Managed Care Medicaid /      In time: 9:32 Out time: 10:17   Total Treatment Time: 45     Medicare/BCBS Time Tracking (below)   Total Timed Codes (min):  45 1:1 Treatment Time:  45     TREATMENT AREA =  Right shoulder pain [M25.511]    SUBJECTIVE    Pain Level (on 0 to 10 scale):  0  / 10   Medication Changes/New allergies or changes in medical history, any new surgeries or procedures? NO     If yes, update Summary List   Subjective Functional Status/Changes:  []  No changes reported     \"I feel a lot better raising up my arm. I was tired doing two sets of push-up's. I think I just want to do one set today. \"          OBJECTIVE    Modalities Rationale: To improve activity tolerance for exercise performance and ADL's.    min [] Estim, type/location:                                      []  att     []  unatt     []  w/US     []  w/ice    []  w/heat    min []  Mechanical Traction: type/lbs                   []  pro   []  sup   []  int   []  cont    []  before manual    []  after manual    min []  Ultrasound, settings/location:      min []  Iontophoresis w/ dexamethasone, location:                                               []  take home patch       []  in clinic   NA min []  Ice     []  Heat    location/position:     min []  Vasopneumatic Device, press/temp:     min []  Other:    [x] Skin assessment post-treatment (if applicable):   [x]  intact    []  redness- no adverse reaction     []redness  adverse reaction:       20 min Therapeutic Exercise:  [x]? ?  See flow sheet   Rationale:      increase ROM and increase strength to improve the patients ability to perform ADL's with improved periscapular mobility.      12 min Therapeutic Activity: Wall slides, wall push-up's   Rationale:   To increase safety and efficiency with ADL's.     13 min Neuromuscular Reeducation: Pulleys IR, repeated cervical retractions and extensions, sleeper stretch   Rationale:      decrease pain, increase ROM and increase tissue extensibility to improve patient's ability to improve overhead ADL performance.          min Patient Education:  YES  Reviewed HEP   []  Progressed/Changed HEP based on:   Patient reports compliance     Other Objective/Functional Measures:    Initiated TRX rows with minimal difficulty. Initiated overhead tricep press 6#. Performed S/L ER with increased AROM to 80 vs 60 degrees with TB ER in standing. See flowsheet for more details. Post Treatment Pain Level (on 0 to 10) scale:   0  / 10     ASSESSMENT    Assessment/Changes in Function:   WIll continue to progress strengthening/AROM per activity tolerance. []  See Progress Note/Recertification   Patient will continue to benefit from skilled PT services to modify and progress therapeutic interventions, address functional mobility deficits, address ROM deficits, address strength deficits, analyze and address soft tissue restrictions, analyze and cue movement patterns, analyze and modify body mechanics/ergonomics and assess and modify postural abnormalities to attain remaining goals. Progress toward goals / Updated goals: Addressed IR HBB with pocket slides, pulleys IR, sleeper stretch.   Addressed shoulder flexion with dragon punch and soulder 3-way     PLAN    [x]  Upgrade activities as tolerated YES Continue plan of care   []  Discharge due to :    []  Other:      Therapist: Natalia Norton    Date: 2/10/2021 Time: 4:54 PM     Future Appointments   Date Time Provider Laurie Darden   2/15/2021  9:30 AM Mary Franks PTA [facility]   2/17/2021  9:30 AM Mary Franks PTA [facility]   2/22/2021  9:30 AM NEEL Hamilton [facility]   2/25/2021  8:00 AM Malcolm Meadows [facility]   3/9/2021  9:30 MITRA [facility] BS AMB   3/16/2021 12:30 PM Apolinar Ames NP [facility] BS AMB

## 2021-02-15 ENCOUNTER — APPOINTMENT (OUTPATIENT)
Dept: PHYSICAL THERAPY | Age: 56
End: 2021-02-15
Payer: MEDICAID

## 2021-02-17 ENCOUNTER — HOSPITAL ENCOUNTER (OUTPATIENT)
Dept: PHYSICAL THERAPY | Age: 56
Discharge: HOME | End: 2021-02-17
Payer: MEDICAID

## 2021-02-17 PROCEDURE — 97110 THERAPEUTIC EXERCISES: CPT

## 2021-02-17 NOTE — PROGRESS NOTES
jmjmjPHYSICAL THERAPY - DAILY TREATMENT NOTE    Patient Name: Isabella Edwards        Date: 2021  : 1965   yes Patient  Verified  Visit #:     Insurance: Payor: [employer] / Plan: [address] [place] [address],[facility] / Product Type: Managed Care Medicaid /      In time: 420 Out time: 1020   Total Treatment Time: 50     Medicare/BCBS Time Tracking (below)   Total Timed Codes (min):  40 1:1 Treatment Time:   40   94TREATMENT AREA =  Right shoulder pain [M25.511]    SUBJECTIVE  Pain Level (on 0 to 10 scale): 0 / 10   Medication Changes/New allergies or changes in medical history, any new surgeries or procedures?    no  If yes, update Summary List   Subjective Functional Status/Changes:  []  No changes reported     \"I got new meds for my depression and it made me so sick. I could not stop vomitting, I been resting since.  It was bad\"      OBJECTIVE  Modalities Rationale:     decrease pain to improve patient's ability to carry/lift/reach/perform ADLs without pain or difficulty      min [] Estim, type/location:                                      []  att     []  unatt     []  w/US     []  w/ice    []  w/heat    min []  Mechanical Traction: type/lbs                   []  pro   []  sup   []  int   []  cont    []  before manual    []  after manual    min []  Ultrasound, settings/location:      min []  Iontophoresis w/ dexamethasone, location:                                               []  take home patch       []  in clinic   10 min []  Ice     [x]  Heat    location/position: seated     min []  Vasopneumatic Device, press/temp:     min []  Other:    [x] Skin assessment post-treatment (if applicable):    [x]  intact    []  redness- no adverse reaction     []redness  adverse reaction:            10 min Therapeutic Exercise:  [x]  See flow sheet   Rationale:      increase ROM and increase strength to improve the patients ability to carry/lift/reach/perform ADLs without pain or difficulty 15 min Therapeutic Activity: [x]  See flow sheet  tband rows/ext with TCs for scap facilitation  wall push ups  wall slides   pulleys IR   Rationale:    increase ROM and increase strength to improve the patients ability to carry/lift/reach/perform ADLs without pain or difficulty     15 min Neuromuscular Re-ed: [x]  See flow sheet  postural re-ed  doorway str  rep sh ext  rep c/s ext   sleepers str   Rationale:    increase ROM and increase strength to improve the patients ability to  perform c/s AROM for ADLs/driving, lifting, reaching, and to perform prolong sitting without pain      Billed With/As:   [x] TE   [] TA   [] Neuro   [] Self Care Patient Education: [x] Review HEP    [] Progressed/Changed HEP based on:   [x] positioning   [x] body mechanics   [x] transfers   [] heat/ice application    [x] other: Pt ed on importance and benefits of compliance with HEP, core strength/stability and proper posture; pt verbalized understanding       Other Objective/Functional Measures:  VCs + demo to perform proper technique for TE   pt required 2 seated rest breaks due to fatigue  held TE per flowsheet due to fatigue   held [facility] Ext with 6# sharp pain, reduced to 4#, pain still present held at this time   Post Treatment Pain Level (on 0 to 10) scale:  0/ 10     ASSESSMENT  Assessment/Changes in Function:   limited by fatigue today   SH AROM with no pain   []  See Progress Note/Recertification   Patient will continue to benefit from skilled PT services to modify and progress therapeutic interventions, address functional mobility deficits, address ROM deficits, address strength deficits, analyze and address soft tissue restrictions, analyze and cue movement patterns, analyze and modify body mechanics/ergonomics, assess and modify postural abnormalities and instruct in home and community integration to attain remaining goals.    Progress toward goals / Updated goals:  · Goals for this certification period include and are to be achieved in   3-4  weeks from 1/20/21:  1. Patient will be independent with an updated HEP to improve carryover upon discharge.    2. Patient will demonstrate IR HBB to T6 to improve efficiency with dressing ADL's.progressing, T7 with pain  3. Patient will demonstrate at least 4/5 shoulder flexion MMT to improve stability with overhead ADL's.  MET- 4/5      PLAN  [x]  Upgrade activities as tolerated yes Continue plan of care   []  Discharge due to :    []  Other:      Therapist: Bedford Cooks, PTA    Date: 2/17/2021 Time: 4:39 PM     Future Appointments   Date Time Provider Laurie Darden   2/22/2021  9:30 AM Wanita Phoenix, PTA [facility]   2/25/2021  8:00 AM Romario Brasher [facility]   3/9/2021  9:30 AM MITRA Heart BS AMB   3/16/2021 12:30 PM JEY Sharma AMB

## 2021-02-19 DIAGNOSIS — E78.5 HYPERLIPIDEMIA, UNSPECIFIED HYPERLIPIDEMIA TYPE: ICD-10-CM

## 2021-02-19 RX ORDER — PRAVASTATIN SODIUM 40 MG/1
TABLET ORAL
Qty: 90 TAB | Refills: 0 | Status: SHIPPED | OUTPATIENT
Start: 2021-02-19 | End: 2021-03-18 | Stop reason: SDUPTHER

## 2021-02-22 ENCOUNTER — HOSPITAL ENCOUNTER (OUTPATIENT)
Dept: PHYSICAL THERAPY | Age: 56
Discharge: HOME | End: 2021-02-22
Payer: MEDICAID

## 2021-02-22 PROCEDURE — 97110 THERAPEUTIC EXERCISES: CPT

## 2021-02-22 PROCEDURE — 97112 NEUROMUSCULAR REEDUCATION: CPT

## 2021-02-22 PROCEDURE — 97530 THERAPEUTIC ACTIVITIES: CPT

## 2021-02-24 ENCOUNTER — APPOINTMENT (OUTPATIENT)
Dept: PHYSICAL THERAPY | Age: 56
End: 2021-02-24
Payer: MEDICAID

## 2021-02-25 ENCOUNTER — HOSPITAL ENCOUNTER (OUTPATIENT)
Dept: PHYSICAL THERAPY | Age: 56
Discharge: HOME | End: 2021-02-25
Payer: MEDICAID

## 2021-02-25 ENCOUNTER — APPOINTMENT (OUTPATIENT)
Dept: PHYSICAL THERAPY | Age: 56
End: 2021-02-25
Payer: MEDICAID

## 2021-02-25 PROCEDURE — 97165 OT EVAL LOW COMPLEX 30 MIN: CPT

## 2021-02-25 NOTE — PROGRESS NOTES
[facility] PHYSICAL THERAPY  [address] [place] [address] Shayna Camarena, [address] - Phone: [phone]  Fax: [phone] / [phone]  [facility] THERAPY SERVICES  Patient Name: Saran Mcqueen : 1965   Medical   Diagnosis: Left wrist pain [M25.532] Treatment Diagnosis: Right hand/wrist pain/stiffness   Onset Date:      Referral Source: Valerie Cullen [place] [facility]): 2021   Prior Hospitalization: See medical history Provider #: [id number]   Prior Level of Function: Ind   Comorbidities: Diabetes    Medications: Verified on Patient Summary List   The Plan of Care and following information is based on the information from the initial evaluation.   ===========================================================================================  Assessment / key information: Patient is a 51 yo right handed female s/p right RCR with resulting right hand/wrist and forearm stiffness. PT is treating patient for RCR. Right UE AROM elbow WNL; supination: 0-55; wrist flex 0-40; ext 0-65; RD 0-10; UD 0-35, gross digit flex/ext but lacks end range PIP/DIP flexion- tight intrinsics. Note resting right hand tremor prn. Elbow 3+/ with cogwheeling; forearm 3+/5; wrist 4/5. Right  13# left 75#. Right pinch 4-5# left 14-18#. Sensation intact. Pain 5/10 wrist. No hand edema. ADLs- needs assist with washing back, dressing- difficulty with bra and laces. IADLs- Ind driving, light tasks, difficulty with cutting foods and strength tasks. FOTO score: na  ===========================================================================================  Eval Complexity: History: LOW Complexity : Brief history review ; Examination: MEDIUM Complexity : 3-5 performance deficits relating to physical, cognitive , or psychosocial skils that result in activity limitations and / or participation restrictions;  Decision Making:MEDIUM Complexity : Patient may present with comorbidities that affect occupational performnce. Miniml to moderate modification of tasks or assistance (eg, physical or verbal ) with assesment(s) is necessary to enable patient to complete evaluation   Problem List: Pain effecting function, Decreased range of motion, Decreased strength, Decreased coordination/prehension and Decreased ADL/functional abilities    Treatment Plan may include any combination of the following: Therapeutic exercise, Therapeutic activities, Physical agent/modality, Patient education and ADLs/IADLs  Patient / Family readiness to learn indicated by: trying to perform skills  Persons(s) to be included in education: patient (P)  Barriers to Learning/Limitations: None  Measures taken: na   Patient Goal (s): Stop pain in wrist   Patient self reported health status: fair  Rehabilitation Potential: good   Short Term Goals: To be accomplished in 2 weeks: 1. Ind HEP  2. Decrease pain 3/10 with ADLs     Long Term Goals: To be accomplished in 4-5  weeks: 1. Full active fist to hold coins  2. No pain with ADLs  3. Full supination to receive change  4. Functional wrist flexion for hygiene  5. Increase right  5-10# for IADLs  6. Baseline ADLs  7. Ind cutting foods  Frequency / Duration:   Patient to be seen 1-2  times per week for 4-5  weeks:  Patient / Caregiver education and instruction: exercises    Therapist Signature: VANDANA Sharp Date: 6/40/0543   Certification Period: na Time: 4:56 PM   ===========================================================================================  I certify that the above Occupational Therapy Services are being furnished while the patient is under my care. I agree with the treatment plan and certify that this therapy is necessary. Physician Signature:        Date:       Time:     Please sign and return to [facility] or you may fax the signed copy to [phone]. Thank you. Syliva Chin, PA

## 2021-02-25 NOTE — PROGRESS NOTES
OCCUPATIONAL THERAPY - DAILY TREATMENT NOTE    Patient Name: Oni Lanza        Date: 2021  : 1965   YES Patient  Verified  Visit #:      10  Insurance: Payor: Deborah Ribera / Plan: [address] [place] [address] / Product Type: Managed Care Medicaid /      In time: 3:30 Out time: 4:00   Total Treatment Time: 30     TREATMENT AREA =  Right wrist/hand    SUBJECTIVE    Pain Level (on 0 to 10 scale):  5  / 10   Medication Changes/New allergies or changes in medical history, any new surgeries or procedures? NO    If yes, update Summary List   Subjective Functional Status/Changes:  []  No changes reported     I have trouble hooking my bra. OBJECTIVE     min Therapeutic Exercise:  [x]  See flow sheet      min Patient Education:  YES  Reviewed HEP- stretches   []  Progressed/Changed HEP based on: Other Objective/Functional Measures:    See eval for detials      Post Treatment Pain Level (on 0 to 10) scale:   5  / 10     ASSESSMENT  Assessment/Changes in Function:     Patient presents with pain and stiffness dominant right hand affecting ADLs and should benefit from skilled OT to address deficits. OT Eval Complexity Justification:  Patient History: Low- RCR  Examination : mod- AROM;pain; strength and ADLs  Clinical Decision Making: mod- ongoing medical issues       [x]  See Progress Note/Recertification   Patient will continue to benefit from skilled OT services to address ROM deficits, address strength deficits and ADLs to attain remaining goals.    Progress toward goals / Updated goals:    See eval goals      PLAN  [x]  Upgrade activities as tolerated YES Continue plan of care   []  Discharge due to :    [x]  Other: OT 1-2 x week x 4-5 weeks for goals      Therapist: NARA Roberts/KRANTHI    Date: 2021 Time: 4:57 PM

## 2021-03-02 ENCOUNTER — HOSPITAL ENCOUNTER (OUTPATIENT)
Dept: PHYSICAL THERAPY | Age: 56
Discharge: HOME | End: 2021-03-02
Payer: MEDICAID

## 2021-03-02 PROCEDURE — 97530 THERAPEUTIC ACTIVITIES: CPT

## 2021-03-02 PROCEDURE — 97110 THERAPEUTIC EXERCISES: CPT

## 2021-03-02 PROCEDURE — 97162 PT EVAL MOD COMPLEX 30 MIN: CPT

## 2021-03-02 PROCEDURE — 97535 SELF CARE MNGMENT TRAINING: CPT

## 2021-03-02 PROCEDURE — 97018 PARAFFIN BATH THERAPY: CPT

## 2021-03-02 NOTE — PROGRESS NOTES
OCCUPATIONAL THERAPY - DAILY TREATMENT NOTE    Patient Name: Gio Babin        Date: 3/2/2021  : 1965   YES Patient  Verified  Visit #:   2   of   10  Ins[facility]: [facility] / Plan: [address] [place] [address] / Product Type: Managed Care Medicaid /      In time: 10:20 Out time: 11:00   Total Treatment Time: 40     TREATMENT AREA =  Right wrist    SUBJECTIVE    Pain Level (on 0 to 10 scale):  3  / 10   Medication Changes/New allergies or changes in medical history, any new surgeries or procedures? NO    If yes, update Summary List   Subjective Functional Status/Changes:  []  No changes reported     I'm going to [facility]ie [facility] next and jessica for my . OBJECTIVE    Modalities Rationale:     decrease pain and increase tissue extensibility to improve patient's ability to use right hand/wrist with all tasks    min [] Estim, type/location:                                      []  att     []  unatt     []  w/US     []  w/ice    []  w/heat    min []  Ultrasound, settings/location:      min []  Iontophoresis w/ dexamethasone, location:                                               []  take home patch       []  in clinic    min []  Ice     []  Heat    location/position:    10 min [x]  Paraffin:  location Right hand/wrist with heat    min []  Vasopneumatic Device, press/temp:     min []  Other:    [x] Skin assessment post-treatment (if applicable):    [x]  intact    []  redness- no adverse reaction     []redness  adverse reaction:        20 min Therapeutic Exercise:  [x]  See flow sheet   Rationale:      increase ROM, increase strength and improve coordination to improve the patients ability to use right hand     10 min Therapeutic Activity: Fine motor; coins   Rationale:    improve coordination to improve the patients ability to use right hand     min Patient Education:  YES  Reviewed HEP   []  Progressed/Changed HEP based on:         Other Objective/Functional Measures:    Began right forearm to hand stretch and strengthening to build functional use right hand. Post Treatment Pain Level (on 0 to 10) scale:   3  / 10     ASSESSMENT  Assessment/Changes in Function:     Good potential to improve right hand skills. []  See Progress Note/Recertification   Patient will continue to benefit from skilled OT services to address ROM deficits and address strength deficits to attain remaining goals. Progress toward goals / Updated goals:     Working toward goals      PLAN  [x]  Upgrade activities as tolerated YES Continue plan of care   []  Discharge due to :    []  Other:      Therapist: NARA Esquivel/KRANTHI    Date: 3/2/2021 Time: 9:31 AM

## 2021-03-02 NOTE — PROGRESS NOTES
PHYSICAL THERAPY - DAILY TREATMENT NOTE    Patient Name: Shola Truong        Date: 3/2/2021  : 1965   YES Patient  Verified  Visit #:      30-  Insurance: Payor: [facility] MEDICAID / Plan: [address] [place] [address] / Product Type: Managed Care Medicaid /      In time: 9:25 Out time: 10:13   Total Treatment Time: 48     Medicare/BCBS Time Tracking (below)   Total Timed Codes (min):  NA 1:1 Treatment Time:  NA     TREATMENT AREA =  Right shoulder, left knee    SUBJECTIVE    Pain Level (on 0 to 10 scale):  4  / 10 shoulder, 7/10 knee   Medication Changes/New allergies or changes in medical history, any new surgeries or procedures?     YES    If yes, update Summary List   Subjective Functional Status/Changes:  []  No changes reported     History of Condition: See POC             OBJECTIVE  Physical Therapy Evaluation - Knee    Gait:  [] Normal    [] Abnormal    [] Antalgic    [] NWB    Device:     Describe:     ROM / Strength  [] Unable to assess                  AROM                      PROM                   Strength (1-5)    Left Right Left Right Left Right   Hip Flexion     4- 4-    Extension     4- 4-    Abduction     3+ 4-    ER     3- 4-   Knee Flexion     4 4    Extension     4- 4   Ankle Plantarflexion     4 4    Dorsiflexion     5 5       Flexibility: [] Unable to assess at this time  Hamstrings:    (L) Tightness= [] WNL   [] Min   [] Mod   [] Severe    (R) Tightness= [] WNL   [] Min   [] Mod   [] Severe  Quadriceps:    (L) Tightness= [] WNL   [] Min   [] Mod   [] Severe    (R) Tightness= [] WNL   [] Min   [] Mod   [] Severe  Gastroc:      (L) Tightness= [] WNL   [] Min   [] Mod   [] Severe    (R) Tightness= [] WNL   [] Min   [] Mod   [] Severe  Other:    Palpation:   Neg/Pos  Neg/Pos  Neg/Pos   Joint Line  Quad tendon  Patellar ligament    Patella  Fibular head  Pes Anserinus    Tibial tubercle  Hamstring tendons  Infrapatellar fat pad      Optional Tests:   Patellar Positioning (Static)   []L []R Normal []L []R Lateral   []L []R Inessa Lennert      []L []R Medial   []L []R [facility]    Patellar Tracking   []L []R Glide (Lat)   []L []R Tilt (Lat)     []L []R Glide (Med)  []L []R Tilt (Med)      []L []R Tile (Inf)     Patellar Mobility   []L []R Hypermobile []L []R Hypomobile         Girth Measurements in cm:      4 in above midpatella   2 in above midpatella  at  midpatella  2 in below midpatella   4 in below midpatella   Left        Right           Lachmans  [] Neg    [] Pos Posterior Drawer [] Neg    [] Pos  Pivot Shift  [] Neg    [] Pos Posterior Sag  [] Neg    [] Pos  Maged's Test [] Neg    [] Pos Cassidy's Test  [] Neg    [] Pos  Squat   [] Neg    [] Pos          Ely's Test             [] Neg    [] Pos  Valgus@ 0 Degrees [] Neg    [] Pos Heaven-Lupillo [] Neg    [] Pos  Valgus@ 30 Degrees [] Neg    [] Pos Patellar Apprehension[] Neg    [] Pos  Varus@ 0 Degrees [] Neg    [] Pos Apley's Compression [] Neg    [] Pos  Varus@ 30 Degrees [] Neg    [] Pos Apley's Distraction [] Neg    [] Pos  Anterior Drawer [] Neg    [] Pos Other:                  [] Neg    [] Pos               Modalities Rationale:    To improve activity tolerance for exercise performance and ADL's.      min [] Estim, type/location:                                      []  att     []  unatt     []  w/US     []  w/ice    []  w/heat    min []  Mechanical Traction: type/lbs                   []  pro   []  sup   []  int   []  cont    []  before manual    []  after manual    min []  Ultrasound, settings/location:      min []  Iontophoresis w/ dexamethasone, location:                                               []  take home patch       []  in clinic    min []  Ice     []  Heat    location/position:     min []  Vasopneumatic Device, press/temp:     min []  Other:    [] Skin assessment post-treatment (if applicable):    [x]  intact    []  redness- no adverse reaction     []redness  adverse reaction:         min Patient Education:  Reji Mcneil Reviewed HEP   []  Progressed/Changed HEP based on:   HEP issued      Other Objective/Functional Measures:    See above     Post Treatment Pain Level (on 0 to 10) scale:   4  / 10 each     ASSESSMENT    Assessment/Changes in Function:     Justification for Eval Code Complexity: MODERATE  Patient History (low 0, mod 1-2, high 3-4): Depression, DM, HTN, asthma HIGH   Examination (low 1-2, mod 3+, high 4+): LE AROM, LE MMT, lifting mechanics, L/S AROM HIGH   Clinical Presentation (low: stable/uncomplicated; mod: evolving; high: unstable/unpredictable): evolving symptoms with tape MODERATE  Clinical Decision Making (low , mod 26-74, high 1-25): MODERATE     []  See Progress Note/Recertification   Patient will continue to benefit from skilled PT services to modify and progress therapeutic interventions, address functional mobility deficits, address ROM deficits, address strength deficits, analyze and address soft tissue restrictions, analyze and cue movement patterns, analyze and modify body mechanics/ergonomics and assess and modify postural abnormalities to attain remaining goals.    Progress toward goals / Updated goals:    Goals established, See PoC     PLAN    [x]  Upgrade activities as tolerated YES Continue plan of care   []  Discharge due to :    [x]  Other: Initiate PoC     Therapist: Chel Rosenthal    Date: 3/2/2021 Time: 9:29 AM

## 2021-03-02 NOTE — PROGRESS NOTES
[address] [facility] PHYSICAL THERAPY  [address] [facility] [place], [address] - Phone: [phone]  Fax: [phone] / [facility]  Patient Name: Dennis Batista : 1965   Medical   Diagnosis: Pain in left knee [M25.562] Treatment Diagnosis: Left Knee OA and mechanical knee pain, Right RTC Dysfunction   Onset Date: DOS Shoulder 2020  Knee      Referral Source: Colen Eisenmenger, PA [facility]): 3/2/2021   Prior Hospitalization: See medical history Provider #:  [id number]   Prior Level of Function: Independent with ADL's   Comorbidities: Depression, DM, HTN, asthma   Medications: Verified on Patient Summary List   The Plan of Care and following information is based on the information from the initial evaluation.   ===========================================================================================  Assessment / key information:  Dennis Batista is a 50 y.o.  female with Dx: Pain in left knee [M25.562], signs and symptoms consistent with left knee OA and mechanical knee pain, and right RTC dysfunction. The pt has been seen for RTC dysfunction following right RTC repair roughly 6 months ago. The pt has made progress in right shoulder AROM aside from IR hand behind back (HBB) and strength in overhead positions. The pt also reports an onset of left mechanical knee pain more aching in nature and most aggravated upon standing following prolonged periods of sitting. 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 The pt denies any injury to the left knee and the location of pain       Objective:  The pt demonstrates the following functional deficits: 1) right shoulder MMT and IR HBB deficits, 2) bilateral LE hip girdle AROM deficits, 3) left ateral knee pain due to valgus orientation with WB activities, 4) left knee AROM deficits (left knee 13-0-105, right knee  10-0-123) and 5) inability to descend stairs in a reciprocal pattern. Shoulder  AROM/PROM (degrees)  MMT  Flexion Right 180 Left 180   4-  Abduction Right 175 Left 175   4-  ER  Right 78   Left 88   3+  IR HBB Right T9   Left T6   4-      Strength (1-5)      Left Right   Hip Flexion 4- 4-     Extension 4- 4-     Abduction 3+ 4-     ER 3- 4-   Knee Flexion 4 4     Extension 4- 4   Ankle Plantarflexion 4 4     Dorsiflexion 5 5     The patient would benefit from skilled PT to address the below listed impairments. Thank you for your referral.  ===========================================================================================  Eval Complexity: History: HIGH Complexity :3+ comorbidities / personal factors will impact the outcome/ POC Exam:HIGH Complexity : 4+ Standardized tests and measures addressing body structure, function, activity limitation and / or participation in recreation  Presentation: MEDIUM Complexity : Evolving with changing characteristics  Clinical Decision Making:MEDIUM Complexity : FOTO score of 26-74Overall Complexity:MEDIUM    Problem List: pain affecting function, decrease ROM, decrease strength, impaired gait/ balance, decrease ADL/ functional abilitiies, decrease activity tolerance, decrease flexibility/ joint mobility and decrease transfer abilities   Treatment Plan may include any combination of the following: Therapeutic exercise, Therapeutic activities, Neuromuscular re-education, Physical agent/modality, Gait/balance training, Manual therapy, Patient education, Self Care training, Functional mobility training, Home safety training and Stair training    Patient / Family readiness to learn indicated by: asking questions, trying to perform skills and interest  Persons(s) to be included in education: patient (P)  Barriers to Learning/Limitations: None  Measures taken: na   Patient Goal (s):  \"To not feel so locked up when I sit for 15 minutes at a time. \"   Patient self reported health status: fair  Rehabilitation Potential: good   Short Term Goals: To be accomplished in  2-3  weeks:  1. Patient will demonstrate compliance with HEP for symptom management at home. 2. Patient will demonstrate at least 115 degrees left knee flexion AROM to improve efficiency with stair negotiation. 3. Patient will demonstrate IR HBB to T6 to improve efficiency with dressing ADL's.  4.   Patient will demonstrate at least 4/5 shoulder flexion MMT to improve stability with overhead ADL's.  Long Term Goals: To be accomplished in  4-6  weeks:  1. Patient will be independent with HEP to self-manage and prevent symptoms upon DC. 2.  Patient will improve shoulder FOTO score to 49 points to indicate improved functional status. 3.  Patient will demonstrate at least 4/5 hip abduction MMT bilaterally to improve pelvic stability with gait. 4.  Patient will ascend and descend 12 stairs in a reciprocal pattern and max 1 UE assist to improve efficiency with community navigation. Frequency / Duration:   Patient to be seen  2-3  times per week for 4-6  weeks:  Patient / Caregiver education and instruction: self care, activity modification and exercises    To ensure your patient receives the highest quality care and to avoid disruption in therapy please sign and return this plan of care within 21 days (3/23/21). Per Medicaid guidelines if the plan of care is not received within 21 days the patient's care must be put on hold until signed. Therapist Signature: Savanna Liang DPT Date: 5/0/5812   Certification Period: NA Time: 9:30 AM   ===========================================================================================  I certify that the above Physical Therapy Services are being furnished while the patient is under my care. I agree with the treatment plan and certify that this therapy is necessary.     Physician Signature:        Date: Time:        MITRA Odonnell  Please sign and return to [facility] or you may fax the signed copy to [phone]. Thank you.

## 2021-03-04 ENCOUNTER — HOSPITAL ENCOUNTER (OUTPATIENT)
Dept: PHYSICAL THERAPY | Age: 56
Discharge: HOME | End: 2021-03-04
Payer: MEDICAID

## 2021-03-04 PROCEDURE — 97530 THERAPEUTIC ACTIVITIES: CPT

## 2021-03-04 PROCEDURE — 97112 NEUROMUSCULAR REEDUCATION: CPT

## 2021-03-04 PROCEDURE — 97110 THERAPEUTIC EXERCISES: CPT

## 2021-03-04 PROCEDURE — 97535 SELF CARE MNGMENT TRAINING: CPT

## 2021-03-04 PROCEDURE — 97116 GAIT TRAINING THERAPY: CPT

## 2021-03-04 NOTE — PROGRESS NOTES
OCCUPATIONAL THERAPY - DAILY TREATMENT NOTE    Patient Name: Gregor Almanzar        Date: 3/4/2021  : 1965   YES Patient  Verified  Visit #:   3   of   10  Insurance: Payor: Korin Bales / Plan: [address] [place] [address] / Product Type: Managed Care Medicaid /      In time: 10:20 Out time: 10:50   Total Treatment Time: 30     TREATMENT AREA =  Right wrist/hand    SUBJECTIVE    Pain Level (on 0 to 10 scale):  0  / 10   Medication Changes/New allergies or changes in medical history, any new surgeries or procedures? NO    If yes, update Summary List   Subjective Functional Status/Changes:  []  No changes reported     I cooked pork chops. OBJECTIVE    15 min Therapeutic Exercise:  [x]  See flow sheet   Rationale:      increase strength and improve coordination to improve the patients ability to use right hand with all tasks     15 min Therapeutic Activity: Coins; fine motor   Rationale:    improve coordination to improve the patients ability to use right hand     min Patient Education:  YES  Reviewed HEP   []  Progressed/Changed HEP based on: Other Objective/Functional Measures: Ind with small and large nuts/bolts. Post Treatment Pain Level (on 0 to 10) scale:   0  / 10     ASSESSMENT  Assessment/Changes in Function:     Improving fine motor skills     []  See Progress Note/Recertification   Patient will continue to benefit from skilled OT services to address strength deficits and coordination  to attain remaining goals. Progress toward goals / Updated goals:     Working toward goals      PLAN  [x]  Upgrade activities as tolerated YES Continue plan of care   []  Discharge due to :    []  Other:      Therapist: NARA Loving/L    Date: 3/4/2021 Time: 10:57 AM

## 2021-03-04 NOTE — PROGRESS NOTES
PHYSICAL THERAPY - DAILY TREATMENT NOTE    Patient Name: Campos Owens        Date: 3/4/2021  : 1965   YES Patient  Verified  Visit #:    32  of   30-40  Insurance: Payor: [facility] MEDICAID / Plan: [facility] [address] / Product Type: Managed Care Medicaid /      In time: 8:40 Out time: 9:30   Total Treatment Time: 50     Medicare/BCBS Time Tracking (below)   Total Timed Codes (min):  NA 1:1 Treatment Time:  NA     TREATMENT AREA =  Pain in left knee [M25.562]  Right shoulder pain [M25.511]    SUBJECTIVE    Pain Level (on 0 to 10 scale):  3  / 10   Medication Changes/New allergies or changes in medical history, any new surgeries or procedures? NO     If yes, update Summary List   Subjective Functional Status/Changes:  []  No changes reported     \"I feel good. I've been trying not to smack my legs back. \"          OBJECTIVE    Modalities Rationale: To improve activity tolerance for exercise performance and ADL's.    min [] Estim, type/location:                                      []  att     []  unatt     []  w/US     []  w/ice    []  w/heat    min []  Mechanical Traction: type/lbs                   []  pro   []  sup   []  int   []  cont    []  before manual    []  after manual    min []  Ultrasound, settings/location:      min []  Iontophoresis w/ dexamethasone, location:                                               []  take home patch       []  in clinic    min []  Ice     []  Heat    location/position:    NA min []  Vasopneumatic Device, press/temp:     min []  Other:    [x] Skin assessment post-treatment (if applicable):   [x]  intact    []  redness- no adverse reaction     []redness  adverse reaction:    10 min Therapeutic Exercise:  [x]  See flow sheet   Rationale:      increase ROM and increase strength to improve the patients ability to improve hip girdle stability and shoulder mobility. 10 min Therapeutic Activity: Sit to stands, wall push-up's   Rationale:    To increase safety and efficiency with ADL's.    12 min Neuromuscular Reeducation Relative SLS with hip 3-way, repeated shoulder IR both AAROM and AROM   Rationale:      decrease pain, increase ROM and increase postural awareness to improve patient's ability to improve SLS stability. 8 min Gait Training: sidestepping with green TB at knees, high knees with knees in a neutral position. To improve safety and efficiency with gait. 10 min Self-Care: Education on self application of mcconnel taping   Rationale: To improve WB tolerance and improve efficiency with standing ADL's like meal preparation. min Patient Education:  YES  Reviewed HEP   []  Progressed/Changed HEP based on:   Patient reports compliance     Other Objective/Functional Measures:    Pt demonstrates significant quadriceps and hamstrings fatigue with TKE's and hip 3-way. Pt is able to self-apply mcconnel taping with minimal assist of the therapist.  Pt requires bilateral UE assist with hip 3-way to avoid comepsnation. Pt reports foot pain with hip 3-way since LE chain is not used to performing activities with proper knee alignment versus hyperextension. Posterior capsule stretch and wand extensions decrease shoulder pain. Pt still requires AAROM of the therapist to improve IR to T6. See flowsheet for more details. Post Treatment Pain Level (on 0 to 10) scale:   0  / 10     ASSESSMENT    Assessment/Changes in Function:     WIll continue to progress strengthening/AROM per activity tolerance. []  See Progress Note/Recertification   Patient will continue to benefit from skilled PT services to modify and progress therapeutic interventions, address functional mobility deficits, address ROM deficits, address strength deficits, analyze and address soft tissue restrictions, analyze and cue movement patterns, analyze and modify body mechanics/ergonomics and assess and modify postural abnormalities to attain remaining goals.    Progress toward goals / Updated goals: · Short Term Goals: To be accomplished in  2-3  weeks:  1. Patient will demonstrate compliance with HEP for symptom management at home. 2. Patient will demonstrate at least 115 degrees left knee flexion AROM to improve efficiency with stair negotiation. 3. Patient will demonstrate IR HBB to T6 to improve efficiency with dressing ADL's. 4.   Patient will demonstrate at least 4/5 shoulder flexion MMT to improve stability with overhead ADL's. · Long Term Goals: To be accomplished in  4-6  weeks:  1. Patient will be independent with HEP to self-manage and prevent symptoms upon DC. 2.  Patient will improve shoulder FOTO score to 49 points to indicate improved functional status. 3.  Patient will demonstrate at least 4/5 hip abduction MMT bilaterally to improve pelvic stability with gait. Addrsesed with sidestepping. 4.  Patient will ascend and descend 12 stairs in a reciprocal pattern and max 1 UE assist to improve efficiency with community navigation. Addressed with TKE's.      PLAN    [x]  Upgrade activities as tolerated YES Continue plan of care   []  Discharge due to :    []  Other:      Therapist: Marilyn Beltre    Date: 3/4/2021 Time: 8:33 AM     Future Appointments   Date Time Provider Laurie Darden   3/4/2021  8:45 AM Cullen Iron [facility]   3/4/2021 10:15 AM Javan Gomez, OTR/L [facility]   3/10/2021  1:00 PM MITRA Rhoades BS AMB   3/11/2021  9:30 AM Cullen Iron [facility]   3/11/2021 10:15 AM Javan Gomez, OTR/L [facility]   3/16/2021  8:45 AM Javan Gomez, OTR/L [facility]   3/16/2021  9:30 AM Patricio Canales PTA [facility]   3/16/2021 12:30 PM Salma Walker NP [facility] BS AMB   3/18/2021  8:45 AM Javan Gomez, OTR/L [facility]   3/18/2021  9:30 AM Algie Iron [facility]   3/23/2021  8:45 AM Javan Gomez, OTR/L [facility]   3/23/2021  9:30 AM Algie Iron [facility]   3/25/2021 11:00 AM Roya [facility]   3/25/2021 11:45 AM Easton Moar, OTR/L [facility]   3/30/2021  8:45 AM Easton Mora, OTR/L [facility]   3/30/2021  9:30 AM Roya [facility]

## 2021-03-10 ENCOUNTER — OFFICE VISIT (OUTPATIENT)
Dept: ORTHOPEDIC SURGERY | Age: 56
End: 2021-03-10
Payer: MEDICAID

## 2021-03-10 VITALS
WEIGHT: 236 LBS | HEART RATE: 89 BPM | SYSTOLIC BLOOD PRESSURE: 108 MMHG | RESPIRATION RATE: 18 BRPM | TEMPERATURE: 97.7 F | OXYGEN SATURATION: 98 % | DIASTOLIC BLOOD PRESSURE: 63 MMHG | HEIGHT: 64 IN | BODY MASS INDEX: 40.29 KG/M2

## 2021-03-10 DIAGNOSIS — S46.011A TRAUMATIC COMPLETE TEAR OF RIGHT ROTATOR CUFF, INITIAL ENCOUNTER: Primary | ICD-10-CM

## 2021-03-10 DIAGNOSIS — Z98.890 STATUS POST ARTHROSCOPY OF RIGHT SHOULDER: ICD-10-CM

## 2021-03-10 PROCEDURE — 99213 OFFICE O/P EST LOW 20 MIN: CPT | Performed by: ORTHOPAEDIC SURGERY

## 2021-03-10 NOTE — PROGRESS NOTES
Gwendolyn Bergman Micky  1965     HISTORY OF PRESENT ILLNESS  Gregor Almanzar is a 50 y.o. female who presents today for evaluation S/P Right shoulder arthroscopic rotator cuff repair, biceps tenotomy and glenohumeral debridment on 7/13/20. Patient has been going to PT. Describes pain as a 4/10. She is working on new exercises which have caused some soreness. She is making progress and would like to continue PT. Patient denies any fever, chills, chest pain, shortness of breath or calf pain. There are no new illness or injuries to report since last seen in the office. PHYSICAL EXAM:   Visit Vitals  /63 (BP 1 Location: Left upper arm, BP Patient Position: Sitting, BP Cuff Size: Large adult)   Pulse 89   Temp 97.7 °F (36.5 °C) (Temporal)   Resp 18   Ht 5' 4\" (1.626 m)   Wt 236 lb (107 kg)   SpO2 98%   BMI 40.51 kg/m²      The patient is a well-developed, well-nourished female in no acute distress. The patient is alert and oriented times three. The patient appears to be well groomed. Mood and affect are normal.  ORTHOPEDIC EXAM of right shoulder:  Inspection: swelling not present,  Bruising not present  Incision well healed  Passive glenohumeral abduction 0-90, 180 PFF, 180 AFF, 70 PER, IR mid thoracic  Stability: Stable  Strength: gentle rotator cuff testing without pain or weakness  2+ distal pulses    IMPRESSION:  S/P Right shoulder arthroscopic rotator cuff repair, biceps tenotomy and glenohumeral debridment    PLAN:   Pt doing well post operatively  Her ROM looks great today. She is doing much better. She will continue volteran gel and diclofenac to help with discomfort. Will continue PT and exercises to work on ROM and continue gentle strengthening. Another order placed today. Stressed to patient that nothing causes an increase in pain. Pt not given a refill of pain medication today. RTC 2 months if she is doing well we will have her come back PRN.     Nataly Arroyo, MIREYA [facility]ist

## 2021-03-11 ENCOUNTER — APPOINTMENT (OUTPATIENT)
Dept: PHYSICAL THERAPY | Age: 56
End: 2021-03-11
Payer: MEDICAID

## 2021-03-16 ENCOUNTER — OFFICE VISIT (OUTPATIENT)
Dept: FAMILY MEDICINE CLINIC | Age: 56
End: 2021-03-16
Payer: MEDICAID

## 2021-03-16 ENCOUNTER — HOSPITAL ENCOUNTER (OUTPATIENT)
Dept: PHYSICAL THERAPY | Age: 56
Discharge: HOME | End: 2021-03-16
Payer: MEDICAID

## 2021-03-16 VITALS
OXYGEN SATURATION: 95 % | TEMPERATURE: 98.2 F | RESPIRATION RATE: 14 BRPM | BODY MASS INDEX: 39.78 KG/M2 | HEART RATE: 89 BPM | DIASTOLIC BLOOD PRESSURE: 69 MMHG | HEIGHT: 64 IN | SYSTOLIC BLOOD PRESSURE: 126 MMHG | WEIGHT: 233 LBS

## 2021-03-16 DIAGNOSIS — Z12.31 ENCOUNTER FOR SCREENING MAMMOGRAM FOR MALIGNANT NEOPLASM OF BREAST: ICD-10-CM

## 2021-03-16 DIAGNOSIS — E11.9 TYPE 2 DIABETES MELLITUS WITHOUT COMPLICATION, WITHOUT LONG-TERM CURRENT USE OF INSULIN (HCC): Primary | ICD-10-CM

## 2021-03-16 DIAGNOSIS — E78.2 MIXED HYPERLIPIDEMIA: ICD-10-CM

## 2021-03-16 DIAGNOSIS — K21.9 GASTROESOPHAGEAL REFLUX DISEASE, UNSPECIFIED WHETHER ESOPHAGITIS PRESENT: ICD-10-CM

## 2021-03-16 DIAGNOSIS — J45.20 MILD INTERMITTENT ASTHMA, UNSPECIFIED WHETHER COMPLICATED: ICD-10-CM

## 2021-03-16 DIAGNOSIS — Z76.89 ENCOUNTER TO ESTABLISH CARE: ICD-10-CM

## 2021-03-16 DIAGNOSIS — J45.909 PERSISTENT ASTHMA WITHOUT COMPLICATION, UNSPECIFIED ASTHMA SEVERITY: ICD-10-CM

## 2021-03-16 DIAGNOSIS — G89.29 CHRONIC RIGHT SHOULDER PAIN: ICD-10-CM

## 2021-03-16 DIAGNOSIS — M25.511 CHRONIC RIGHT SHOULDER PAIN: ICD-10-CM

## 2021-03-16 DIAGNOSIS — Z12.11 COLON CANCER SCREENING: ICD-10-CM

## 2021-03-16 DIAGNOSIS — I10 ESSENTIAL HYPERTENSION WITH GOAL BLOOD PRESSURE LESS THAN 130/80: ICD-10-CM

## 2021-03-16 DIAGNOSIS — R11.2 NAUSEA AND VOMITING, INTRACTABILITY OF VOMITING NOT SPECIFIED, UNSPECIFIED VOMITING TYPE: ICD-10-CM

## 2021-03-16 PROCEDURE — 97530 THERAPEUTIC ACTIVITIES: CPT

## 2021-03-16 PROCEDURE — 99214 OFFICE O/P EST MOD 30 MIN: CPT | Performed by: NURSE PRACTITIONER

## 2021-03-16 PROCEDURE — 97110 THERAPEUTIC EXERCISES: CPT

## 2021-03-16 PROCEDURE — 97112 NEUROMUSCULAR REEDUCATION: CPT

## 2021-03-16 RX ORDER — MONTELUKAST SODIUM 10 MG/1
10 TABLET ORAL DAILY
Qty: 90 TAB | Refills: 0 | Status: SHIPPED | OUTPATIENT
Start: 2021-03-16 | End: 2021-06-22

## 2021-03-16 RX ORDER — ALBUTEROL SULFATE 90 UG/1
2 AEROSOL, METERED RESPIRATORY (INHALATION)
Qty: 3 INHALER | Refills: 2 | Status: SHIPPED | OUTPATIENT
Start: 2021-03-16 | End: 2022-05-29

## 2021-03-16 NOTE — PROGRESS NOTES
jmjmjPHYSICAL THERAPY - DAILY TREATMENT NOTE    Patient Name: Barbara Sheehan        Date: 3/16/2021  : 1965   yes Patient  Verified  Visit #:   32 of   34  Insurance: Payor: [address] / Plan: [address] / Product Type: Managed Care Medicaid /      In time: 820 Out time: 1016   Total Treatment Time: 46     Medicare/BCBS Time Tracking (below)   Total Timed Codes (min):  46 1:1 Treatment Time:   55   94TREATMENT AREA =  Pain in left knee [M25.562]  Right shoulder pain [M25.511]    SUBJECTIVE  Pain Level (on 0 to 10 scale): 5-6 / 10    Medication Changes/New allergies or changes in medical history, any new surgeries or procedures?    no  If yes, update Summary List   Subjective Functional Status/Changes:  []  No changes reported     \"I can not do things with my SH anymore. Ever since 3 weeks ago when I lifted the 6# OH with Danita Ermelinda. Its been bothering me more. I do the bands and its ok.  I liked the tape on my knees last time \"     OBJECTIVE  Modalities Rationale:     decrease pain to improve patient's ability to carry/lift/reach/perform ADLs without pain or difficulty      min [] Estim, type/location:                                      []  att     []  unatt     []  w/US     []  w/ice    []  w/heat    min []  Mechanical Traction: type/lbs                   []  pro   []  sup   []  int   []  cont    []  before manual    []  after manual    min []  Ultrasound, settings/location:      min []  Iontophoresis w/ dexamethasone, location:                                               []  take home patch       []  in clinic   10 min []  Ice     [x]  Heat    location/position: seated     min []  Vasopneumatic Device, press/temp:     min []  Other:    [x] Skin assessment post-treatment (if applicable):    [x]  intact    []  redness- no adverse reaction     []redness  adverse reaction:         14 min Therapeutic Exercise:  [x]  See flow sheet   Rationale:      increase ROM and increase strength to improve the patients ability to carry/lift/reach/perform ADLs without pain or difficulty       10 min Therapeutic Activity: [x]  See flow sheet  SH KENIA with dowel OH  bed mobility    Rationale:    increase ROM and increase strength to improve the patients ability to carry/lift/reach/perform ADLs without pain or difficulty     12 min Neuromuscular Re-ed: [x]  See flow sheet  postural re-ed  piriformis str  sleepers str   Rationale:    increase ROM and increase strength to improve the patients ability to  perform c/s AROM for ADLs/driving, lifting, reaching, and to perform prolong sitting without pain      Billed With/As:   [x] TE   [] TA   [] Neuro   [] Self Care Patient Education: [x] Review HEP    [] Progressed/Changed HEP based on:   [x] positioning   [x] body mechanics   [x] transfers   [] heat/ice application    [x] other: Pt ed on importance and benefits of compliance with HEP, core strength/stability and proper posture; pt verbalized understanding       Other Objective/Functional Measures:  VCs + demo to perform proper technique for TE  held TE after ~7 reps of Vega AQUINO with mattie in supine due to pain  supine tband ER with no pain   increase pain > 45 degs of abd with sleeper str, instructed to perform all TE to min pain free range    manual: KT to med/lat knee with \"0\" tech to B knees  *Billed with TE*   Post Treatment Pain Level (on 0 to 10) scale:  2-3/ 10     ASSESSMENT  Assessment/Changes in Function:   demos increase mm guarding and pain in right SH  no increase in knee pain with TE    []  See Progress Note/Recertification   Patient will continue to benefit from skilled PT services to modify and progress therapeutic interventions, address functional mobility deficits, address ROM deficits, address strength deficits, analyze and address soft tissue restrictions, analyze and cue movement patterns, analyze and modify body mechanics/ergonomics, assess and modify postural abnormalities and instruct in home and community integration to attain remaining goals. Progress toward goals / Updated goals:  · Goals for this certification period include and are to be achieved in   3-4  weeks from 1/20/21:  1. Patient will be independent with an updated HEP to improve carryover upon discharge.    2. Patient will demonstrate IR HBB to T6 to improve efficiency with dressing ADL's.progressing, T7 with pain  3. Patient will demonstrate at least 4/5 shoulder flexion MMT to improve stability with overhead ADL's.  MET- 4/5      PLAN  [x]  Upgrade activities as tolerated yes Continue plan of care   []  Discharge due to :    []  Other:      Therapist: Bedford Cooks, PTA    Date: 3/16/2021 Time: 12:47 PM     Future Appointments   Date Time Provider Laurie Darden   3/18/2021  8:45 AM Frederick Goff, OTR/L [facility]   3/18/2021  9:30 AM Bart Brasher [facility]   3/23/2021  8:45 AM Frederick Goff OTR/L [facility]   3/23/2021  9:30 AM [facility]   3/25/2021 11:00 AM Bart Brasher [facility]   3/25/2021 11:45 AM Frederick Goff, OTR/L [facility]   3/30/2021  8:45 AM Frederick Goff OTR/L [facility]   3/30/2021  9:30 AM Romario Court [facility]   5/11/2021 10:30 AM MITRA Heart [facility] BS AMB

## 2021-03-16 NOTE — PROGRESS NOTES
Chief Complaint   Patient presents with    Anxiety    Depression    GERD     1. Have you been to the ER, urgent care clinic since your last visit? Hospitalized since your last visit? No    2. Have you seen or consulted any other health care providers outside of the [address] [place] [address] since your last visit? Include any pap smears or colon screening. Ortho, Phys Therapy, Opthalmology    Chief Complaint   Patient presents with    Anxiety    Depression    GERD       Chief Complaint   Patient presents with    Anxiety    Depression    GERD       3 most recent [facility] 36 Screens 3/16/2021   Little interest or pleasure in doing things Not at all   Feeling down, depressed, irritable, or hopeless Nearly every day   Total Score PHQ 2 3   Trouble falling or staying asleep, or sleeping too much Several days   Feeling tired or having little energy Several days   Poor appetite, weight loss, or overeating Not at all   Feeling bad about yourself - or that you are a failure or have let yourself or your family down Not at all   Trouble concentrating on things such as school, work, reading, or watching TV Nearly every day   Moving or speaking so slowly that other people could have noticed; or the opposite being so fidgety that others notice Several days   Thoughts of being better off dead, or hurting yourself in some way Not at all   PHQ 9 Score 9     Fall Risk Assessment, last 12 mths 3/16/2021   Able to walk? Yes   Fall in past 12 months? 0   Do you feel unsteady? 0   Are you worried about falling 0               No flowsheet data found. Visit Vitals  /69   Pulse 89   Temp 98.2 °F (36.8 °C) (Oral)   Resp 14   Ht 5' 4\" (1.626 m)   Wt 233 lb (105.7 kg)   SpO2 95%   BMI 39.99 kg/m²        No flowsheet data found.   Visit Vitals  /69   Pulse 89   Temp 98.2 °F (36.8 °C) (Oral)   Resp 14   Ht 5' 4\" (1.626 m)   Wt 233 lb (105.7 kg)   SpO2 95%   BMI 39.99 kg/m²

## 2021-03-16 NOTE — PROGRESS NOTES
[address] [place] [address]               [phone]      Michelle Enciso is a 50 y.o. female and presents with Anxiety, Depression, and GERD       Assessment/Plan:    Diagnoses and all orders for this visit:    1. Type 2 diabetes mellitus without complication, without long-term current use of insulin (HCC)  -     HEMOGLOBIN A1C WITH EAG; Future  -     MICROALBUMIN, UR, RAND W/ MICROALB/CREAT RATIO; Future  Endorses medication compliance  Denies signs and symptoms of hyper or hypoglycemia  Does not have a glucometer, unable to check her blood glucose at home  We will have her obtain follow-up labs today    2. Essential hypertension with goal blood pressure less than 117/30  -     METABOLIC PANEL, COMPREHENSIVE; Future  Endorses medication compliance  Blood pressure stable at today's visit, continue same medications  She does endorse some chest pain with activity, reports it is relieved with her albuterol inhaler, does admit to not taking her Pulmicort as prescribed due to running out of it  Reordered her Pulmicort and will continue to monitor her symptoms, currently it sounds like it is more related to her asthma    3. Mixed hyperlipidemia  -     LIPID PANEL; Future  Endorses medication compliance  Denies abdominal pain or signs and symptoms of myalgia  Follow-up labs today    4. Persistent asthma without complication, unspecified asthma severity  -     budesonide (PULMICORT FLEXHALER) 90 mcg/actuation aepb inhaler; Take 2 Puffs by inhalation two (2) times a day. -     montelukast (SINGULAIR) 10 mg tablet; Take 1 Tab by mouth daily. Asthma has been moderately controlled with the exception of the above-mentioned problems with activity  I have reordered her Pulmicort because she has run out of it  Medication refill for montelukast provided    5.  Mild intermittent asthma, unspecified whether complicated  -     albuterol (PROVENTIL HFA, VENTOLIN HFA, PROAIR HFA) 90 mcg/actuation inhaler; Take 2 Puffs by inhalation every six (6) hours as needed for Wheezing. Refill provided    6. Chronic right shoulder pain  Currently in physical therapy at [facility] and follow by Dr. Anisa Mock, orthopedics    7. Gastroesophageal reflux disease, unspecified whether esophagitis present  -     REFERRAL TO GASTROENTEROLOGY  She will be going to the gastroenterologist for her colon cancer screening, I advised her she should discuss this with the gastroenterologist on her visit    8. Nausea and vomiting, intractability of vomiting not specified, unspecified vomiting type  -     REFERRAL TO GASTROENTEROLOGY  She will be going to the gastroenterologist for her colon cancer screening, I advised her she should discuss this with the gastroenterologist on her visit    9. Colon cancer screening  -     REFERRAL TO GASTROENTEROLOGY  Health maintenance needs addressed    10. Encounter for screening mammogram for malignant neoplasm of breast  -     LEX MAMMO BI SCREENING INCL CAD; Future  Health maintenance needs addressed    11. Encounter to establish care  Visit today to establish care, prior patient of Dr. Elin Gonzalez  Other orders  -     HEMOGLOBIN A1C W/O EAG        Follow-up and Dispositions    · Return in about 3 months (around 6/16/2021) for DM, DM foot, HLD, HTN, w/gyn, 30 min, office only.          Subjective:    Health Maintenance Due    Topic Date Due     MICROALBUMIN Q1  Never done Ordered today    Eye Exam Retinal or Dilated  Never done Com[facility] at [facility] center    COVID-19 Vaccine (1) Never done     Pneumococcal 0-64 years (1 of 1 - PPSV23) Never done     Shingrix Vaccine Age 50> (1 of 2) Never done     Colorectal Cancer Screening Combo  Never done Ordered today    Breast Cancer Screen Mammogram  11/14/2017 Ordered today    PAP AKA CERVICAL CYTOLOGY  06/23/2018 Asked to schedule    Flu Vaccine (1) 09/01/2020     DTaP/Tdap/Td series (2 - Td) 11/19/2020      DMII- Patient reports medication compliance Daily  Diabetic diet compliance occasionally  Patient monitors blood sugars regularly does not have a glucometer   Reports am fasting sugars range does not check   Denies hypoglycemic episodes yes  Denies polyuria, polydipsia, paraesthesia, vision changes? yes     Diabetic Foot and Eye Exam HM Status   Topic Date Due    Eye Exam  Never done    Diabetic Foot Care  08/24/2021     Hemoglobin A1c   Date Value Ref Range Status   03/16/2021 6.7 (H) 4.8 - 5.6 % Final     Hemoglobin A1c, External   Date Value Ref Range Status   01/08/2020 8.7  Final     Comment:     ce   ]  Creatinine, urine   Date Value Ref Range Status   03/16/2021 69 mg/dL Final     Microalb/Creat ratio (ug/mg creat.)   Date Value Ref Range Status   03/16/2021 24.6 0.0 - 30.0 Final     Key Antihyperglycemic Medications             metFORMIN ER (GLUCOPHAGE XR) 500 mg tablet (Taking/Discontinued) Take 1 Tab by mouth daily (with dinner). Hypertension:   Patient reports taking medications as instructed. yes   Medication side effects noted. no  Headache upon wakening. no   Home BP monitoring in range of 116, 112/72    Do you experience chest pain/pressure or SOB with exertion? yes, gets pressure on chest-left side and midline, starts sweating and coughing, denies nausea, pain to back, jaw, endorses pain to ear sometimes  She will use albuterol, feels a little bit better, the pressure remains for a little while,   This happens about two times a week  Has been out of her pulmicort about a week, takes pulmicort as prescribed  Maintain a low salt diet?  yes  Key CAD CHF Meds             pravastatin (PRAVACHOL) 40 mg tablet (Taking/Discontinued) TAKE 1 TABLET BY MOUTH EVERY DAY AT NIGHT    hydroCHLOROthiazide (HYDRODIURIL) 25 mg tablet (Taking) TAKE 1 TABLET BY MOUTH EVERY DAY    losartan (COZAAR) 25 mg tablet (Taking/Discontinued) TAKE 1 TABLET BY MOUTH EVERY DAY        HLD:  Has been compliant with meds  all of the time  Compliant with low-fat diet. most of the time    Denies myalgias or other side effects. yes  Cholesterol, total   Date Value Ref Range Status   03/16/2021 150 110 - 200 mg/dL Final     Triglyceride   Date Value Ref Range Status   03/16/2021 99 40 - 149 mg/dL Final     HDL Cholesterol   Date Value Ref Range Status   03/16/2021 42 >=40 mg/dL Final     LDL, calculated   Date Value Ref Range Status   03/16/2021 89 50 - 99 mg/dL Final   ]  Key Antihyperlipidemia Meds             pravastatin (PRAVACHOL) 40 mg tablet (Taking/Discontinued) TAKE 1 TABLET BY MOUTH EVERY DAY AT NIGHT         GERD  Onset: long standing  Currently taking nexium, this helps  Symptoms: when laying down she feels like she is going to throw up, when she does lie down she will throw up  The problem only happens when she goes to sleep    ROS:     ROS  As stated in HPI, otherwise all others negative. The problem list was updated as a part of today's visit. Patient Active Problem List   Diagnosis Code    Syphilis in female A53.9    Menopause Z78.0    Eczema L30.9    Anxiety and depression F41.9, F32.9    Mild intermittent asthma J45.20    Severe obesity ([facility] [address] [address] E66.01    Essential hypertension with goal blood pressure less than 130/80 I10    Hyperlipidemia E78.5    Non-seasonal allergic rhinitis J30.89    Type 2 diabetes mellitus without complication, without long-term current use of insulin (HCC) E11.9    Chronic right shoulder pain M25.511, G89.29       The PSH, FH were reviewed. SH:  Social History     Tobacco Use    Smoking status: Current Every Day Smoker     Packs/day: 0.50     Years: 35.00     Pack years: 17.50     Types: Cigarettes    Smokeless tobacco: Never Used    Tobacco comment: quit in january, smokes one occ   Substance Use Topics    Alcohol use:  Yes     Alcohol/week: 2.0 standard drinks     Types: 2 Shots of liquor per week    Drug use: Yes     Types: Cocaine       Medications/Allergies:  Current Outpatient Medications on File Prior to Visit   Medication Sig Dispense Refill    hydroCHLOROthiazide (HYDRODIURIL) 25 mg tablet TAKE 1 TABLET BY MOUTH EVERY DAY 90 Tab 1    ketoconazole (NIZORAL) 2 % topical cream Apply once daily to cover the affected and immediate surrounding area for 2 weeks 30 g 2    diclofenac EC (VOLTAREN) 75 mg EC tablet Take 1 Tab by mouth two (2) times daily (with meals). 60 Tab 0     No current facility-administered medications on file prior to visit. Allergies   Allergen Reactions    Aspirin Nausea and Vomiting    Lisinopril Cough    Prednisone Swelling       Objective:  Visit Vitals  /69   Pulse 89   Temp 98.2 °F (36.8 °C) (Oral)   Resp 14   Ht 5' 4\" (1.626 m)   Wt 233 lb (105.7 kg)   SpO2 95%   BMI 39.99 kg/m²    Body mass index is 39.99 kg/m². Physical assessment  Physical Exam  Vitals signs and nursing note reviewed. Eyes:      Conjunctiva/sclera: Conjunctivae normal.      Pupils: Pupils are equal, round, and reactive to light. Neck:      Musculoskeletal: Normal range of motion. Vascular: No JVD. Cardiovascular:      Rate and Rhythm: Normal rate and regular rhythm. Heart sounds: Normal heart sounds. No murmur. No friction rub. No gallop. Pulmonary:      Effort: Pulmonary effort is normal.      Breath sounds: Normal breath sounds. Musculoskeletal: Normal range of motion. Skin:     General: Skin is warm and dry. Neurological:      Mental Status: She is alert and oriented to person, place, and time.    Psychiatric:         Mood and Affect: Affect normal.         Cognition and Memory: Memory normal.         Judgment: Judgment normal.           Labwork and Ancillary Studies:    CBC w/Diff  Lab Results   Component Value Date/Time    WBC 10.7 07/09/2020 08:49 AM    HGB 12.6 07/09/2020 08:49 AM    PLATELET 677 44/74/0847 08:49 AM         Basic Metabolic Profile  Lab Results   Component Value Date/Time    Sodium 142 03/16/2021 01:11 PM    Potassium 4.6 03/16/2021 01:11 PM    Chloride 101 03/16/2021 01:11 PM    CO2 29 03/16/2021 01:11 PM    Anion gap 12.0 03/16/2021 01:11 PM    Glucose 97 03/16/2021 01:11 PM    BUN 17 03/16/2021 01:11 PM    Creatinine 1.0 03/16/2021 01:11 PM    BUN/Creatinine ratio 14 07/09/2020 08:49 AM    GFR est AA >60 07/09/2020 08:49 AM    GFR est non-AA 59 (L) 07/09/2020 08:49 AM    Calcium 10.2 03/16/2021 01:11 PM        Cholesterol  Lab Results   Component Value Date/Time    Cholesterol, total 150 03/16/2021 01:11 PM    HDL Cholesterol 42 03/16/2021 01:11 PM    LDL, calculated 89 03/16/2021 01:11 PM    Triglyceride 99 03/16/2021 01:11 PM    CHOL/HDL Ratio 5.4 (H) 11/05/2019 08:44 AM       Health Maintenance:   Health Maintenance   Topic Date Due    Eye Exam Retinal or Dilated  Never done    COVID-19 Vaccine (1) Never done    Pneumococcal 0-64 years (1 of 1 - PPSV23) Never done    Shingrix Vaccine Age 50> (1 of 2) Never done    Colorectal Cancer Screening Combo  Never done    Breast Cancer Screen Mammogram  11/14/2017    PAP AKA CERVICAL CYTOLOGY  06/23/2018    Flu Vaccine (1) 09/01/2020    DTaP/Tdap/Td series (2 - Td) 11/19/2020    Foot Exam Q1  08/24/2021    A1C test (Diabetic or Prediabetic)  03/16/2022    MICROALBUMIN Q1  03/16/2022    Lipid Screen  03/16/2022    Hepatitis C Screening  Completed       I have discussed the diagnosis with the patient and the intended plan as seen in the above orders. The patient has received an After-Visit Summary and questions were answered concerning future plans. An After Visit Summary was printed and given to the patient. All diagnosis have been discussed with the patient and all of the patient's questions have been answered. Follow-up and Dispositions    · Return in about 3 months (around 6/16/2021) for DM, DM foot, HLD, HTN, w/gyn, 30 min, office only.            Isa Molina, PARAMJIT-BC  [facility]   [id number] [id number]

## 2021-03-16 NOTE — PROGRESS NOTES
OCCUPATIONAL THERAPY - DAILY TREATMENT NOTE    Patient Name: Liyah Cruz        Date: 3/16/2021  : 1965   YES Patient  Verified  Visit #:   4   of   10  Insurance: Payor: Aziza Kendall / Plan: [address] / Product Type: Managed Care Medicaid /      In time: 8:45 Out time: 9:30   Total Treatment Time: 45     TREATMENT AREA =  Right wrist/hand/forearm    SUBJECTIVE    Pain Level (on 0 to 10 scale):  0  / 10   Medication Changes/New allergies or changes in medical history, any new surgeries or procedures? NO    If yes, update Summary List   Subjective Functional Status/Changes:  []  No changes reported     I've been using my(right) hand more. OBJECTIVE    25 min Therapeutic Exercise:  [x]  See flow sheet   Rationale:      increase ROM, increase strength and improve coordination to improve the patients ability to use right hand with all tasks     20 min Therapeutic Activity: Ind with coins, cutting foods and tying laces   Rationale:    increase strength and improve coordination to improve the patients ability to use right hand with all tasks     min Patient Education:  YES  Reviewed HEP   [x]  Progressed/Changed HEP based on:   + strengthening      Other Objective/Functional Measures:    AROM right forearm, wrist and hand is normal.      Post Treatment Pain Level (on 0 to 10) scale:   0  / 10     ASSESSMENT  Assessment/Changes in Function:     Improved AROM and strength right forearm, wrist and hand. []  See Progress Note/Recertification   Patient will continue to benefit from skilled OT services to address strength deficits to attain remaining goals. Progress toward goals / Updated goals:    AROM goals met. Focus on strength.       PLAN  [x]  Upgrade activities as tolerated YES Continue plan of care   []  Discharge due to :    []  Other:      Therapist: NARA Kothari/L    Date: 3/16/2021 Time: 8:43 AM

## 2021-03-16 NOTE — PATIENT INSTRUCTIONS
Please get office noted from [facility] the ophthalmology center faxed to my office. Gastroenterology for colonoscopy and talk with them about your reflux.

## 2021-03-17 ENCOUNTER — TELEPHONE (OUTPATIENT)
Dept: FAMILY MEDICINE CLINIC | Age: 56
End: 2021-03-17

## 2021-03-17 LAB
A-G RATIO,AGRAT: 1.6 RATIO (ref 1.1–2.6)
ALBUMIN SERPL-MCNC: 4.4 G/DL (ref 3.5–5)
ALP SERPL-CCNC: 106 U/L (ref 25–115)
ALT SERPL-CCNC: 14 U/L (ref 5–40)
ANION GAP SERPL CALC-SCNC: 12 MMOL/L (ref 3–15)
AST SERPL W P-5'-P-CCNC: 17 U/L (ref 10–37)
AVG GLU, 10930: 145 MG/DL (ref 91–123)
BILIRUB SERPL-MCNC: 0.1 MG/DL (ref 0.2–1.2)
BUN SERPL-MCNC: 17 MG/DL (ref 6–22)
CALCIUM SERPL-MCNC: 10.2 MG/DL (ref 8.4–10.5)
CHLORIDE SERPL-SCNC: 101 MMOL/L (ref 98–110)
CHOLEST SERPL-MCNC: 150 MG/DL (ref 110–200)
CO2 SERPL-SCNC: 29 MMOL/L (ref 20–32)
CREAT SERPL-MCNC: 1 MG/DL (ref 0.5–1.2)
CREATININE, URINE: 69 MG/DL
GFRAA, 66117: >60
GFRNA, 66118: 55.4
GLOBULIN,GLOB: 2.7 G/DL (ref 2–4)
GLUCOSE SERPL-MCNC: 97 MG/DL (ref 70–99)
HBA1C MFR BLD HPLC: 6.7 % (ref 4.8–5.6)
HDLC SERPL-MCNC: 3.6 MG/DL (ref 0–5)
HDLC SERPL-MCNC: 42 MG/DL
LDL/HDL RATIO,LDHD: 2.1
LDLC SERPL CALC-MCNC: 89 MG/DL (ref 50–99)
MICROALB/CREAT RATIO, 140286: 24.6 (ref 0–30)
MICROALBUMIN,URINE RANDOM 140054: 17 MG/L (ref 0.1–17)
NON-HDL CHOLESTEROL, 011976: 108 MG/DL
POTASSIUM SERPL-SCNC: 4.6 MMOL/L (ref 3.5–5.5)
PROT SERPL-MCNC: 7.1 G/DL (ref 6.4–8.3)
SODIUM SERPL-SCNC: 142 MMOL/L (ref 133–145)
TRIGL SERPL-MCNC: 99 MG/DL (ref 40–149)
VLDLC SERPL CALC-MCNC: 20 MG/DL (ref 8–30)

## 2021-03-17 NOTE — TELEPHONE ENCOUNTER
I called pt- wanting to know a little more in regards to this diability paperwork, that was faxed to our office for JEY Sorenson to fill out. Per pt- hearing specialty took over the disability paperwork and filled it out for pt. Per pt- pt stated she no longer needs it addressed.

## 2021-03-18 ENCOUNTER — APPOINTMENT (OUTPATIENT)
Dept: PHYSICAL THERAPY | Age: 56
End: 2021-03-18
Payer: MEDICAID

## 2021-03-23 ENCOUNTER — HOSPITAL ENCOUNTER (OUTPATIENT)
Dept: PHYSICAL THERAPY | Age: 56
Discharge: HOME | End: 2021-03-23
Payer: MEDICAID

## 2021-03-23 PROCEDURE — 97112 NEUROMUSCULAR REEDUCATION: CPT

## 2021-03-23 PROCEDURE — 97110 THERAPEUTIC EXERCISES: CPT

## 2021-03-23 PROCEDURE — 97530 THERAPEUTIC ACTIVITIES: CPT

## 2021-03-23 PROCEDURE — 97535 SELF CARE MNGMENT TRAINING: CPT

## 2021-03-23 NOTE — PROGRESS NOTES
PHYSICAL THERAPY - DAILY TREATMENT NOTE    Patient Name: Franck Damico        Date: 3/23/2021  : 1965   YES Patient  Verified  Visit #:     Insurance: Payor: [address] / Plan: [address] / Product Type: Managed Care Medicaid /      In time: 9:26 Out time: 10:10   Total Treatment Time: 44     Medicare/BCBS Time Tracking (below)   Total Timed Codes (min):  NA 1:1 Treatment Time:  NA     TREATMENT AREA =  Pain in left knee [M25.562]  Right shoulder pain [M25.511]    SUBJECTIVE    Pain Level (on 0 to 10 scale):  3  / 10   Medication Changes/New allergies or changes in medical history, any new surgeries or procedures? NO     If yes, update Summary List   Subjective Functional Status/Changes:  []  No changes reported     \"I can lift overhead. I just can't lift no heavy weights. I like the other tape better if you can do that kind. The other kind just comes off so easily. \"          OBJECTIVE    Modalities Rationale:  To improve activity tolerance for exercise performance and ADL's.    min [] Estim, type/location:                                      []  att     []  unatt     []  w/US     []  w/ice    []  w/heat    min []  Mechanical Traction: type/lbs                   []  pro   []  sup   []  int   []  cont    []  before manual    []  after manual    min []  Ultrasound, settings/location:      min []  Iontophoresis w/ dexamethasone, location:                                               []  take home patch       []  in clinic   NA min []  Ice     []  Heat    location/position:     min []  Vasopneumatic Device, press/temp:     min []  Other:    [x] Skin assessment post-treatment (if applicable):   [x]  intact    []  redness- no adverse reaction     []redness  adverse reaction:    12 min Therapeutic Exercise:  [x]  See flow sheet   Rationale:      increase ROM and increase strength to improve the patients ability to perform ADL's with improved hip girdle and periscapular stability. 10 min Therapeutic Activity: Mini-squats, bridges   Rationale: To increase safety and efficiency with ADL's.    12 min Neuromuscular Reeducation Relative SLS with hip 3-way   Rationale: To improve postural awareness and eccentric control with relative SLS ADL's and ADL's on dynamic surfaces. 10 min Self-Care: Education on application of mcconnel taping   Rationale: To improve WB tolerance on the left LE.        min Patient Education:  YES  Reviewed HEP   []  Progressed/Changed HEP based on:   Patient reports compliance     Other Objective/Functional Measures:    Pt reports no increase in symptoms with overhead shoulder flexion. Pt requires cues to perform standing hip abduction and sidelying hip abduction without compensation. Pt reports relief from mcconnel taping and desire to purchase her own tape supplies next visit. Pt See flowsheet for more details. Post Treatment Pain Level (on 0 to 10) scale:   0  / 10     ASSESSMENT    Assessment/Changes in Function:     WIll continue to progress strengthening/AROM per activity tolerance. []  See Progress Note/Recertification   Patient will continue to benefit from skilled PT services to modify and progress therapeutic interventions, address functional mobility deficits, address ROM deficits, address strength deficits, analyze and address soft tissue restrictions, analyze and cue movement patterns, analyze and modify body mechanics/ergonomics and assess and modify postural abnormalities to attain remaining goals. Progress toward goals / Updated goals: Addressed hip abduction with standing/sidelying hip abduction. Addressed FOTO with squats and bridges. Addressed IR HBB with pocket slides.      PLAN    [x]  Upgrade activities as tolerated YES Continue plan of care   []  Discharge due to :    []  Other:      Therapist: Jesus Greco    Date: 3/23/2021 Time: 9:44 AM     Future Appointments   Date Time Provider Laurie Darden   3/25/2021 11:00 AM Analilia [facility]   3/25/2021 11:45 AM Zena Barakat, OTR/L [facility]   3/30/2021  8:45 AM Zena Barakat, OTR/L [facility]   3/30/2021  9:30 AM [facility]   5/11/2021 10:30 AM MITRA Mccoy [facility] BS AMB   6/15/2021 11:15 AM JEY Santana BS AMB

## 2021-03-23 NOTE — PROGRESS NOTES
[facility] PHYSICAL THERAPY  [facility] [address], [address] - Phone: [phone]  Fax: [phone]  [address] [place] [address] THERAPY          Patient Name: Oni Lanza : 1965   Medical/Treatment Diagnosis: Left wrist pain [M25.532]   Onset Date:     Referral Source: Yumiko Stinson [facility]): 21   Prior Hospitalization: See Medical History Provider #: [id number]   Prior Level of Function: Ind    Comorbidities: Diabetes    Medications: Verified on Patient Summary List   Visits from [facility]: 5 Missed Visits: 1 cx       Goal/Measure of Progress Goal Met? 1. Full active fist to hold coins    Status at last Eval: Tight intrinsics  Current Status: Ind  yes   2. No pain with ADLs   Status at last Eval: 5/10 Current Status: 0 yes   3. Increase right  5-10# for IADLs   Status at last Eval: 13# Current Status: 45# yes   4. Baseline ADLs/IADLs   Status at last Eval: Difficulty with fine motor and strength tasks Current Status: Ind ADLs and IADLs yes     Key Functional Changes/Progress: AROM right hand and wrist is normal with 5/5 strength. Right  45#. She is Ind with ADLs and IADLs. Assessments/Recommendations: Discontinue therapy. Progressing towards or have reached established goals. If you have any questions/comments please contact us directly at [phone]. Thank you for allowing us to assist in the care of your patient. Therapist Signature: VANDANA Roberts Date: 3/23/21   Reporting Period: 21-3/23/21 Time: 9:29 AM       NOTE TO PHYSICIAN:  PLEASE COMPLETE THE ORDERS BELOW AND FAX TO   [facility] Physical Therapy: [phone] If you are unable to process this request in 24 hours please contact our office: [phone].    ___ I have read the above report and request that my patient be discharged from therapy.      Physician Signature:        Date:       Time: MITRA Barber

## 2021-03-25 ENCOUNTER — APPOINTMENT (OUTPATIENT)
Dept: PHYSICAL THERAPY | Age: 56
End: 2021-03-25
Payer: MEDICAID

## 2021-03-25 ENCOUNTER — HOSPITAL ENCOUNTER (OUTPATIENT)
Dept: PHYSICAL THERAPY | Age: 56
Discharge: HOME | End: 2021-03-25
Payer: MEDICAID

## 2021-03-25 PROCEDURE — 97530 THERAPEUTIC ACTIVITIES: CPT

## 2021-03-25 PROCEDURE — 97112 NEUROMUSCULAR REEDUCATION: CPT

## 2021-03-25 PROCEDURE — 97116 GAIT TRAINING THERAPY: CPT

## 2021-03-25 PROCEDURE — 97110 THERAPEUTIC EXERCISES: CPT

## 2021-03-25 NOTE — PROGRESS NOTES
PHYSICAL THERAPY - DAILY TREATMENT NOTE    Patient Name: Moriah Shukla        Date: 3/25/2021  : 1965   YES Patient  Verified  Visit #:     Insurance: Payor: [address] / Plan: [address] / Product Type: Managed Care Medicaid /      In time: 10:50 Out time: 11:40   Total Treatment Time: 50       TREATMENT AREA = Pain in left knee [M25.562]  Right shoulder pain [M25.511]    SUBJECTIVE    Pain Level (on 0 to 10 scale):  3 knee, 0 shoulder  / 10   Medication Changes/New allergies or changes in medical history, any new surgeries or procedures? NO    If yes, update Summary List   Subjective Functional Status/Changes:  []  No changes reported     \"The shoulder's fine right now. It's just not as strong. It's mainly the knee that bothers me \"          OBJECTIVE     Therapeutic Procedures:  Min Procedure Specifics + Rationale   n/a [x]  Patient Education (performed throughout session) [x] Review HEP    [] Progressed/Changed HEP based on:   [] proper performance and advancement of Therex/TA   [] reduction in pain level    [] increased functional capacity       [] change in directional preference   15 [x] Therapeutic Exercise   [x]  See Flowsheet   Rationale: increase ROM and increase strength to improve the patients ability to participate in ADL's    10 [x]  Gait Training       HK/Retro/Sidestepping  Rationale: Normalize gait, increase proprioceptive and kinesthetic awareness, coordination, balance   15 [x] Therapeutic Activity   [x]  See Flowsheet  Rationale:  To improve safety, proprioception, coordination, and efficiency with tasks   10 [x] Neuromuscular Re-ed   [x]  See Flowsheet  Rationale: increase ROM, increase strength, improve coordination, improve balance and increase proprioception  to improve the patients ability to safely participate in ADL's         Other Objective/Functional Measures:    Patient declined taping as she still had tape from the other day and it is still intact. Post Treatment Pain Level (on 0 to 10) scale:   0  / 10     ASSESSMENT    Assessment/Changes in Function:       Fair balance in performing gait         Patient will continue to benefit from skilled PT services to modify and progress therapeutic interventions, address functional mobility deficits, address ROM deficits, address strength deficits, analyze and address soft tissue restrictions, analyze and cue movement patterns and instruct in home and community integration  to attain remaining goals   Progress toward goals / Updated goals:    Progressing in mobility for ADL's     PLAN    [x]  Upgrade activities as tolerated  [x]  Update interventions per flow sheet YES Continue plan of care   []  Discharge due to :    []  Other:      Therapist: Elder Person \"BJ\" Antonella Munguia, JONAST, Cert. MDT, Cert. DN, Cert. SMT, Dip.  Osteopractic    Date: 3/25/2021 Time: 11:02 AM     Future Appointments   Date Time Provider Laurie Darden   3/30/2021  9:30 AM Margretta Mote [facility]   4/1/2021  9:30 AM Margretta Mote [facility]   4/6/2021  9:30 AM Margretta Mote [facility]   4/8/2021  9:30 AM Margretta Mote [facility]   4/13/2021  9:30 AM Margretta Mote [facility]   4/15/2021  9:30 AM Margretta Mote [facility]   4/20/2021  9:30 AM Margretta Mote [facility]   4/22/2021  9:30 AM Margretta Mote [facility]   4/27/2021  9:30 AM Margretta Mote [facility]   4/29/2021  9:30 AM Margretta Mote [facility]   5/11/2021 10:30 AM MITRA Weinberg [facility] BS AMB   6/15/2021 11:15 AM Teodora Alcocer NP AMA BS AMB

## 2021-03-30 ENCOUNTER — APPOINTMENT (OUTPATIENT)
Dept: PHYSICAL THERAPY | Age: 56
End: 2021-03-30
Payer: MEDICAID

## 2021-03-30 ENCOUNTER — HOSPITAL ENCOUNTER (OUTPATIENT)
Dept: PHYSICAL THERAPY | Age: 56
Discharge: HOME | End: 2021-03-30
Payer: MEDICAID

## 2021-03-30 PROCEDURE — 97110 THERAPEUTIC EXERCISES: CPT

## 2021-03-30 PROCEDURE — 97112 NEUROMUSCULAR REEDUCATION: CPT

## 2021-03-30 PROCEDURE — 97535 SELF CARE MNGMENT TRAINING: CPT

## 2021-03-30 PROCEDURE — 97530 THERAPEUTIC ACTIVITIES: CPT

## 2021-03-30 NOTE — PROGRESS NOTES
PHYSICAL THERAPY - DAILY TREATMENT NOTE    Patient Name: Bryson Ribeiro        Date: 3/30/2021  : 1965   YES Patient  Verified  Visit #:     Insurance: Payor: [address] / Plan: [address] / Product Type: Managed Care Medicaid /      In time: 9:32 Out time: 10:10   Total Treatment Time: 38     Medicare/BCBS Time Tracking (below)   Total Timed Codes (min):  NA 1:1 Treatment Time:  NA     TREATMENT AREA =  Pain in left knee [M25.562]  Right shoulder pain [M25.511]    SUBJECTIVE    Pain Level (on 0 to 10 scale):  1  / 10   Medication Changes/New allergies or changes in medical history, any new surgeries or procedures? NO     If yes, update Summary List   Subjective Functional Status/Changes:  []  No changes reported     \"I can lift my arm easier and reach behind easier. I left my tape on for a week last time and it still helped. I need some more today though. \"         OBJECTIVE    Modalities Rationale:  To improve activity tolerance for exercise performance and ADL's.    min [] Estim, type/location:                                      []  att     []  unatt     []  w/US     []  w/ice    []  w/heat    min []  Mechanical Traction: type/lbs                   []  pro   []  sup   []  int   []  cont    []  before manual    []  after manual    min []  Ultrasound, settings/location:      min []  Iontophoresis w/ dexamethasone, location:                                               []  take home patch       []  in clinic   NA min []  Ice     []  Heat    location/position:     min []  Vasopneumatic Device, press/temp:     min []  Other:    [x] Skin assessment post-treatment (if applicable):   [x]  intact    []  redness- no adverse reaction     []redness - adverse reaction:    12 min Therapeutic Exercise:  [x]  See flow sheet   Rationale:      increase ROM and increase strength to improve the patients ability to perform ADL's with improved hip girdle and periscapular stability. 10 min Therapeutic Activity: Stair negotiation, bridges   Rationale: To increase safety and efficiency with ADL's.    12 min Neuromuscular Reeducation Relative SLS with hip 3-way, IR belt stretch   Rationale: To improve postural awareness and eccentric control with relative SLS ADL's and ADL's on dynamic surfaces. 10 min Self-Care: Education on application of mcconnel taping   Rationale: To improve WB tolerance on the left LE.        min Patient Education:  YES  Reviewed HEP   []  Progressed/Changed HEP based on:   Patient reports compliance     Other Objective/Functional Measures:    Pt reports no increase in symptoms with overhead shoulder flexion. Pt can ascend stairs without pain reciprocally, but has discomfort with reciprocal stair descent. Pt does require bilateral UE assist with stair negotition. Pt reports relief from mcconnel taping and desire to purchase her own tape supplies next visit. Pt See flowsheet for more details. Post Treatment Pain Level (on 0 to 10) scale:   0  / 10     ASSESSMENT    Assessment/Changes in Function:     WIll continue to progress strengthening/AROM per activity tolerance. []  See Progress Note/Recertification   Patient will continue to benefit from skilled PT services to modify and progress therapeutic interventions, address functional mobility deficits, address ROM deficits, address strength deficits, analyze and address soft tissue restrictions, analyze and cue movement patterns, analyze and modify body mechanics/ergonomics and assess and modify postural abnormalities to attain remaining goals. Progress toward goals / Updated goals: Addressed hip abduction with standing/sidelying hip abduction. Addressed FOTO with stair negotiation and bridges. Addressed IR HBB with pocket slides and belt stretch.      PLAN    [x]  Upgrade activities as tolerated YES Continue plan of care   []  Discharge due to :    []  Other:      Therapist: Yeison López Penny    Date: 3/30/2021 Time: 9:44 AM     Future Appointments   Date Time Provider Laurie Darden   4/1/2021  9:30 AM Toy Larger [facility]   4/6/2021  9:30 AM Toy Larger [facility]   4/8/2021  9:30 AM Toy Larger [facility]   4/13/2021  9:30 AM Toy Larger [facility]   4/15/2021  9:30 AM Toy Larger [facility]   4/20/2021  9:30 AM Toy Larger [facility]   4/22/2021  9:30 AM Toy Larger [facility]   4/27/2021  9:30 AM Toy Larger [facility]   4/29/2021  9:30 AM Toy Larger [facility]   5/11/2021 10:30 AM MITRA Ramirez BS AMB   6/15/2021 11:15 AM JEY Sherman BS AMB

## 2021-03-31 ENCOUNTER — TELEPHONE (OUTPATIENT)
Dept: FAMILY MEDICINE CLINIC | Age: 56
End: 2021-03-31

## 2021-04-01 ENCOUNTER — HOSPITAL ENCOUNTER (OUTPATIENT)
Dept: PHYSICAL THERAPY | Age: 56
Discharge: HOME | End: 2021-04-01
Payer: MEDICAID

## 2021-04-01 PROCEDURE — 97535 SELF CARE MNGMENT TRAINING: CPT

## 2021-04-01 PROCEDURE — 97116 GAIT TRAINING THERAPY: CPT

## 2021-04-01 PROCEDURE — 97110 THERAPEUTIC EXERCISES: CPT

## 2021-04-01 PROCEDURE — 97112 NEUROMUSCULAR REEDUCATION: CPT

## 2021-04-01 NOTE — PROGRESS NOTES
[facility] PHYSICAL THERAPY  [address] [place] [address] Patrice Camarena, [address] - Phone: [phone]  Fax: [phone] [place] [address] THERAPY          Patient Name: Waleska Corriea : 1965   Treatment/Medical Diagnosis: Pain in left knee [M25.562]  Right shoulder pain [M25.511]   Onset Date: DOS Shoulder 2020  Knee     [facility]   Prior Hospitalization: See Medical History Provider #:  [id number]   Prior Level of Function: Independent with ADL's   Comorbidities: Depression, DM, HTN, asthma   Medications: Verified on Patient Summary List   Visits from [facility]: 31 Missed Visits: 0     1. Patient will demonstrate at least 4/5 shoulder flexion MMT to improve stability with overhead ADL's. Status at last Eval: 4- Current Status: 4 yes   2. Patient will demonstrate IR HBB to T6 to improve efficiency with dressing ADL's. Status at last Eval: T9 Current Status: T6 yes   3. Patient will ascend and descend 12 stairs in a reciprocal pattern and max 1 UE assist to improve efficiency with community navigation. Status at last Eval: Unable reciprocal Current Status: Reciprocal requires bilateral UE assist Progressing   4. Patient will demonstrate at least 115 degrees left knee flexion AROM to improve efficiency with stair negotiation. Status at last Eval: 105 Current Status: 118 yes     Patient's POC has consisted of therex, therapeutic activities, manual therapy prn, modalities prn, pt. education, and a comprehensive HEP. Treatment strategies used to address functional mobility deficits, ROM deficits, strength deficits, analyze and address soft tissue restrictions, analyze and cue movement patterns, analyze and modify body mechanics/ergonomics, assess and modify postural abnormalities and instruct in home and community integration. Key Functional Changes/Progress:  The pt reports minor functional deficits on the right shoulder aside from joint pain when it rains. The pt demonstrates full UE ROM and 4/5 UE MMT in all planes of motion. The pt is improving in knee AROM and tolerance for functional activities indicated by 118 degrees of knee flexion and reciprocal stair negotiation. Problem List: pain affecting function, decrease ROM, decrease strength, impaired gait/ balance, decrease ADL/ functional abilitiies, decrease activity tolerance, decrease flexibility/ joint mobility and decrease transfer abilities   Treatment Plan may include any combination of the following: Therapeutic exercise, Therapeutic activities, Neuromuscular re-education, Physical agent/modality, Gait/balance training, Manual therapy, Patient education, Self Care training, Functional mobility training, Home safety training and Stair training   Goals for this certification period include and are to be achieved in   3-4  weeks:  1. Patient will be independent with HEP to self-manage and prevent symptoms upon DC. 2.   Patient will ascend and descend 12 stairs in a reciprocal pattern and max 1 UE assist to improve efficiency with community navigation. 3.   Patient will demonstrate at least 4/5 hip abduction MMT bilaterally to improve pelvic stability with gait. Frequency / Duration:   Patient to be seen   1-2   times per week for   3-4    weeks:    Assessments/Recommendations: The patient would continue to benefit from skilled interventions to address the above mentioned functional deficits. See above for more details. If you have any questions/comments please contact us directly at [phone]. Thank you for allowing us to assist in the care of your patient.     Therapist Signature: Girish Granger DPT Date: 8/2/4373   Certification Period:  Reporting Period: NA  3/2/21 - 4/1/21 Time: 9:31 AM   NOTE TO PHYSICIAN:  PLEASE COMPLETE THE ORDERS BELOW AND FAX TO   [facility]: [phone]) [phone]. If you are unable to process this request in 24 hours please contact our office: [phone].    ___ I have read the above report and request that my patient continue as recommended.   ___ I have read the above report and request that my patient continue therapy with the following changes/special instructions: ________________________________________________   ___ I have read the above report and request that my patient be discharged from therapy.      Physician Signature:        Date:       Time:       Valerie Childress

## 2021-04-01 NOTE — PROGRESS NOTES
PHYSICAL THERAPY - DAILY TREATMENT NOTE    Patient Name: Candelario Novak        Date: 2021  : 1965   YES Patient  Verified  Visit #:   32   of   32+  Insurance: Payor: Jody Miranda / Plan: [address] / Product Type: Managed Care Medicaid /      In time: 9:32 Out time: 10:15   Total Treatment Time: 43     Medicare/BCBS Time Tracking (below)   Total Timed Codes (min):  NA 1:1 Treatment Time:  NA     TREATMENT AREA =  Pain in left knee [M25.562]  Right shoulder pain [M25.511]    SUBJECTIVE    Pain Level (on 0 to 10 scale):  2  / 10   Medication Changes/New allergies or changes in medical history, any new surgeries or procedures? NO     If yes, update Summary List   Subjective Functional Status/Changes:  []  No changes reported     \"I'm a little more sore because of the rain. \"          OBJECTIVE    Modalities Rationale: To improve activity tolerance for exercise performance and ADL's.    min [] Estim, type/location:                                      []  att     []  unatt     []  w/US     []  w/ice    []  w/heat    min []  Mechanical Traction: type/lbs                   []  pro   []  sup   []  int   []  cont    []  before manual    []  after manual    min []  Ultrasound, settings/location:      min []  Iontophoresis w/ dexamethasone, location:                                               []  take home patch       []  in clinic   NA min []  Ice     []  Heat    location/position:     min []  Vasopneumatic Device, press/temp:     min []  Other:    [x] Skin assessment post-treatment (if applicable):   [x]  intact    []  redness- no adverse reaction     []redness  adverse reaction:    15 min Therapeutic Exercise:  [x]  See flow sheet   Rationale:      increase ROM and increase strength to improve the patients ability to perform ADL's with improved hip girdle stability. 8 min Neuromuscular Reeducation Relative SLS with hip 3-way   Rationale:       To improve postural awareness and eccentric control with relative SLS ADL's and ADL's on dynamic surfaces. 10 min Self-Care: faciiltation of tape order to perform self mcconnel taping at home   Rationale: To improve skin health and prevent increased edema. 10 min Gait Training: Sidestepping, high knees   Rationale: To improve safety and efficiency with gait. min Patient Education:  YES  Reviewed HEP   []  Progressed/Changed HEP based on:   Patient reports compliance     Other Objective/Functional Measures:    See progress note. Post Treatment Pain Level (on 0 to 10) scale:   0  / 10     ASSESSMENT    Assessment/Changes in Function:     See progress note. []  See Progress Note/Recertification   Patient will continue to benefit from skilled PT services to modify and progress therapeutic interventions, address functional mobility deficits, address ROM deficits, address strength deficits, analyze and address soft tissue restrictions, analyze and cue movement patterns, analyze and modify body mechanics/ergonomics and assess and modify postural abnormalities to attain remaining goals. Progress toward goals / Updated goals: Addressed hip abduction with sidelying hip abduction and hip 3-way.      PLAN    [x]  Upgrade activities as tolerated YES Continue plan of care   []  Discharge due to :    []  Other:      Therapist: Michelle Orellana    Date: 4/1/2021 Time: 9:30 AM     Future Appointments   Date Time Provider Laurie Darden   4/6/2021  9:30 AM [facility]   4/8/2021  9:30 AM [facility]   4/13/2021  [facility]   4/15/2021  9:30 AM [facility]   4/20/2021  9:30 AM [facility]   4/22/2021  9:30 AM [facility]   4/27/2021  9:30 AM [facility]   4/29/2021  9:30 AM Wiliam Boggs [facility]   5/11/2021 10:30 AM MITRA [facility] BS AMB   6/15/2021 11:15 AM Heather Carbone, Kleber Colon NP AMA BS AMB

## 2021-04-06 ENCOUNTER — APPOINTMENT (OUTPATIENT)
Dept: PHYSICAL THERAPY | Age: 56
End: 2021-04-06
Payer: MEDICAID

## 2021-04-08 ENCOUNTER — HOSPITAL ENCOUNTER (OUTPATIENT)
Dept: PHYSICAL THERAPY | Age: 56
Discharge: HOME | End: 2021-04-08
Payer: MEDICAID

## 2021-04-08 PROCEDURE — 97110 THERAPEUTIC EXERCISES: CPT

## 2021-04-08 PROCEDURE — 97530 THERAPEUTIC ACTIVITIES: CPT

## 2021-04-08 PROCEDURE — 97112 NEUROMUSCULAR REEDUCATION: CPT

## 2021-04-08 PROCEDURE — 97116 GAIT TRAINING THERAPY: CPT

## 2021-04-08 NOTE — PROGRESS NOTES
PHYSICAL THERAPY - DAILY TREATMENT NOTE    Patient Name: Laurie Phillips        Date: 2021  : 1965   YES Patient  Verified  Visit #:   32   of   32+  Insurance: Payor: Leon Magaña / Plan: [address] / Product Type: Managed Care Medicaid /      In time: 9:35 Out time: 10:24   Total Treatment Time: 49     Medicare/BCBS Time Tracking (below)   Total Timed Codes (min):  NA 1:1 Treatment Time:  NA     TREATMENT AREA =  Pain in left knee [M25.562]  Right shoulder pain [M25.511]    SUBJECTIVE    Pain Level (on 0 to 10 scale):  2  / 10   Medication Changes/New allergies or changes in medical history, any new surgeries or procedures? NO     If yes, update Summary List   Subjective Functional Status/Changes:  []  No changes reported     \"I' washed my son's car and now my back is paying for it. Juliethsaira Tracyw \"          OBJECTIVE    Modalities Rationale: To improve activity tolerance for exercise performance and ADL's.    min [] Estim, type/location:                                      []  att     []  unatt     []  w/US     []  w/ice    []  w/heat    min []  Mechanical Traction: type/lbs                   []  pro   []  sup   []  int   []  cont    []  before manual    []  after manual    min []  Ultrasound, settings/location:      min []  Iontophoresis w/ dexamethasone, location:                                               []  take home patch       []  in clinic   NA min []  Ice     []  Heat    location/position:     min []  Vasopneumatic Device, press/temp:     min []  Other:    [x] Skin assessment post-treatment (if applicable):   [x]  intact    []  redness- no adverse reaction     []redness  adverse reaction:    15 min Therapeutic Exercise:  [x]  See flow sheet   Rationale:      increase ROM and increase strength to improve the patients ability to perform ADL's with improved hip girdle stability. 12 min Neuromuscular Reeducation Relative SLS with hip 3-way   Rationale:       To improve postural awareness and eccentric control with relative SLS ADL's and ADL's on dynamic surfaces. 12 min Therapeutic Activity Postural overcorrect, lifting mechanics, SHAINA   Rationale: To improve safety and efficiency with household ADL's. 10 min Gait Training: Sidestepping, high knees   Rationale: To improve safety and efficiency with gait. min Patient Education:  YES  Reviewed HEP   []  Progressed/Changed HEP based on:   Patient reports compliance     Other Objective/Functional Measures:    Pt reports LBP and right LE radicular pain with warm-up on the Nu-Step. Attempted SHAINA to improve tolerance for hip 3-way. Pt LBP symptoms temporarily abolished and returned with hip 3-way. Right L/S side-glides abolished LBP while finishing hip 3-way. See flowsheet for more details. Post Treatment Pain Level (on 0 to 10) scale:   0  / 10     ASSESSMENT    Assessment/Changes in Function:     WIll continue to progress strengthening/AROM per activity tolerance. []  See Progress Note/Recertification   Patient will continue to benefit from skilled PT services to modify and progress therapeutic interventions, address functional mobility deficits, address ROM deficits, address strength deficits, analyze and address soft tissue restrictions, analyze and cue movement patterns, analyze and modify body mechanics/ergonomics and assess and modify postural abnormalities to attain remaining goals. Progress toward goals / Updated goals: Addressed stair negotiation with step-up's and hip abduction with hip 3-way.      PLAN    [x]  Upgrade activities as tolerated YES Continue plan of care   []  Discharge due to :    []  Other:      Therapist: Ishaan Purvis    Date: 4/8/2021 Time: 9:30 AM     Future Appointments   Date Time Provider Laurie Darden   4/13/2021  9:30 AM [facility]   4/15/2021  9:30 AM [facility]   4/20/2021  9:30 AM [facility]   4/22/2021 9:30 AM Daphen Se[facility]   4/27/2021  9:30 AM Daphne Seats [facility]   4/29/2021  9:30 AM Daphne Seats [facility]   5/11/2021 10:30 AM MITRA Castelan [facility] BS AMB   6/15/2021 11:15 AM Larissa Cornell NP AM BS AMB

## 2021-04-12 DIAGNOSIS — I10 ESSENTIAL (PRIMARY) HYPERTENSION: ICD-10-CM

## 2021-04-12 DIAGNOSIS — J30.89 NON-SEASONAL ALLERGIC RHINITIS, UNSPECIFIED TRIGGER: ICD-10-CM

## 2021-04-12 RX ORDER — FLUTICASONE PROPIONATE 50 MCG
SPRAY, SUSPENSION (ML) NASAL
Qty: 16 G | Refills: 11 | Status: SHIPPED | OUTPATIENT
Start: 2021-04-12 | End: 2021-09-20 | Stop reason: SDUPTHER

## 2021-04-12 RX ORDER — LOSARTAN POTASSIUM 25 MG/1
TABLET ORAL
Qty: 30 TAB | Refills: 11 | Status: SHIPPED | OUTPATIENT
Start: 2021-04-12 | End: 2021-08-31 | Stop reason: SDUPTHER

## 2021-04-13 ENCOUNTER — HOSPITAL ENCOUNTER (OUTPATIENT)
Dept: PHYSICAL THERAPY | Age: 56
Discharge: HOME | End: 2021-04-13
Payer: MEDICAID

## 2021-04-13 PROCEDURE — 97110 THERAPEUTIC EXERCISES: CPT

## 2021-04-13 PROCEDURE — 97530 THERAPEUTIC ACTIVITIES: CPT

## 2021-04-13 PROCEDURE — 97112 NEUROMUSCULAR REEDUCATION: CPT

## 2021-04-13 PROCEDURE — 97116 GAIT TRAINING THERAPY: CPT

## 2021-04-13 NOTE — PROGRESS NOTES
PHYSICAL THERAPY - DAILY TREATMENT NOTE    Patient Name: Amanda Gusman        Date: 2021  : 1965   YES Patient  Verified  Visit #:   +  Insurance: Payor: Rosario Calvillo / Plan: [address] / Product Type: Managed Care Medicaid /      In time: 2:32 Out time: 3:22   Total Treatment Time: 50       TREATMENT AREA = Pain in left knee [M25.562]  Right shoulder pain [M25.511]    SUBJECTIVE    Pain Level (on 0 to 10 scale):  3  / 10   Medication Changes/New allergies or changes in medical history, any new surgeries or procedures? NO    If yes, update Summary List   Subjective Functional Status/Changes:  []  No changes reported     \"I've been able to do more, but I overdo it. I'm supposed to see my doctor again soon about my knee and discuss my options. The good thing is my legs don't go falling asleep on me anymore since I've been coming to therapy. \"          OBJECTIVE     Therapeutic Procedures:  Min Procedure Specifics + Rationale   n/a [x]  Patient Education (performed throughout session) [x] Review HEP    [] Progressed/Changed HEP based on:   [] proper performance and advancement of Therex/TA   [] reduction in pain level    [] increased functional capacity       [] change in directional preference   15 [x] Therapeutic Exercise   [x]  See Flowsheet   Rationale: increase ROM and increase strength to improve the patients ability to participate in ADL's    10 [x]  Gait Training       Grass ambulation HK/Retro/Sidestepping  Rationale: Normalize gait, increase proprioceptive and kinesthetic awareness, coordination, balance   15 [x] Therapeutic Activity   [x]  See Flowsheet  Rationale:  To improve safety, proprioception, coordination, and efficiency with tasks   10 [x] Neuromuscular Re-ed   [x]  See Flowsheet  Rationale: increase ROM, increase strength, improve coordination, improve balance and increase proprioception  to improve the patients ability to safely participate in ADL's         Other Objective/Functional Measures:    Increased reps/sets/resistance per flow sheet. Post Treatment Pain Level (on 0 to 10) scale:   0  / 10     ASSESSMENT    Assessment/Changes in Function:       Tolerated treatment well without complaints of progression, indicating improved functional capacity for ADL's. No LoB in grass ambulation         Patient will continue to benefit from skilled PT services to address functional mobility deficits, address ROM deficits, address strength deficits, analyze and address soft tissue restrictions, analyze and cue movement patterns and instruct in home and community integration  to attain remaining goals   Progress toward goals / Updated goals:    Progressing in strength     PLAN    [x]  Upgrade activities as tolerated  [x]  Update interventions per flow sheet YES Continue plan of care   []  Discharge due to :    []  Other:      Therapist: Pranav Clayton \"BJ\" ZEB Smith, Cert. MDT, Cert. DN, Cert. SMT, Dip.  Osteopractic    Date: 4/13/2021 Time: 2:32 PM     Future Appointments   Date Time Provider Laurie Darden   4/13/2021  2:45 PM Gisel Tony, PT [facility] [address]   4/15/2021  9:30 AM Chin Waters PTA [facility] [address]   4/20/2021  9:30 AM Mando Blue, PT [facility] [address]   4/22/2021  9:30 AM Pamela Naik [facility] [address]   5/11/2021 10:30 AM MITRA Sanders BS AMB   6/15/2021 11:15 AM Scott Olguin NP AMA BS AMB

## 2021-04-15 ENCOUNTER — APPOINTMENT (OUTPATIENT)
Dept: PHYSICAL THERAPY | Age: 56
End: 2021-04-15
Payer: MEDICAID

## 2021-04-20 ENCOUNTER — APPOINTMENT (OUTPATIENT)
Dept: PHYSICAL THERAPY | Age: 56
End: 2021-04-20
Payer: MEDICAID

## 2021-04-22 ENCOUNTER — HOSPITAL ENCOUNTER (OUTPATIENT)
Dept: PHYSICAL THERAPY | Age: 56
Discharge: HOME | End: 2021-04-22
Payer: MEDICAID

## 2021-04-22 PROCEDURE — 97110 THERAPEUTIC EXERCISES: CPT

## 2021-04-22 PROCEDURE — 97112 NEUROMUSCULAR REEDUCATION: CPT

## 2021-04-22 PROCEDURE — 97530 THERAPEUTIC ACTIVITIES: CPT

## 2021-04-22 NOTE — PROGRESS NOTES
PHYSICAL THERAPY - DAILY TREATMENT NOTE    Patient Name: Vel Chaudhary        Date: 2021  : 1965   yes Patient  Verified  Visit #:   29   of   44  Insurance: Payor: Marva Sanchez / Plan: [address] / Product Type: Managed Care Medicaid /      In time: 147 Out time: 1005   Total Treatment Time: 46     Medicare/BCBS Time Tracking (below)   Total Timed Codes (min):  46 1:1 Treatment Time:  46     TREATMENT AREA =  Pain in left knee [M25.562]  Right shoulder pain [M25.511]    SUBJECTIVE  Pain Level (on 0 to 10 scale):  3-4  / 10   Medication Changes/New allergies or changes in medical history, any new surgeries or procedures?    no  If yes, update Summary List   Subjective Functional Status/Changes:  []  No changes reported     \"I am not feeling too good lately. I just be staying in the bed. I am tired of going to doctors. I am ready to wrap this and do it on my own. I will keep up with my HEP. I feel good with everything. 70% overall better since [facility]. \"    Pt reports ability to amb x 30 mins without increase in pain      OBJECTIVE      15 min Therapeutic Exercise:  [x]  See flow sheet   Rationale:      increase ROM, increase strength and improve coordination to improve the patients ability to safely perform ADLs, bending/stooping/ lifting; prolong sitting, standing and ambulation; and negotiate stairs with minimal to no pain and with min to no limitations       15 min Therapeutic Activity: [x]  See flow sheet  sit <>std without UE  bridges for bed mobility  stair negotiation   SS   Rationale:    increase ROM, increase strength and improve coordination to improve the patients ability to safely perform ADLs, bending/stooping/ lifting; prolong sitting, standing and ambulation; and negotiate stairs with minimal to no pain and with min to no limitations      10 min Neuromuscular Re-ed: [x]  See flow sheet  balance on AE for hip 3 way  piriformis str   Rationale:    increase ROM, increase strength and improve coordination to improve the patients ability to safely perform ADLs, bending/stooping/ lifting; prolong sitting, standing and ambulation; and negotiate stairs with minimal to no pain and with min to no limitations        Billed With/As:   [x] TE   [x] TA   [x] Neuro   [] Self Care Patient Education: [x] Review HEP    [] Progressed/Changed HEP based on:   [x] positioning   [x] body mechanics   [x] transfers   [] heat/ice application    [x] other: Pt ed on importance and benefits of compliance with HEP, core strength/stability and proper posture; pt verbalized understanding     Other Objective/Functional Measures:  min VCing for TE   sit <>std test x 5: 17\"x 3  (I) negotiating up and down 4 steps x 3  with no HandRails safely with reciprocal pattern  hip abd 4+/5   Post Treatment Pain Level (on 0 to 10) scale:   0  / 10     ASSESSMENT  Assessment/Changes in Function:   See D/C note   []  See Progress Note/Recertification   Patient will continue to benefit from skilled PT services to See D/C note     Progress toward goals / Updated goals:  · Goals for this certification period include and are to be achieved in   3-4  weeks from 4/1/21:  1. Patient will be independent with HEP to self-manage and prevent symptoms upon DC. MET  2. Patient will ascend and descend 12 stairs in a reciprocal pattern and max 1 UE assist to improve efficiency with community navigation. MET   3. Patient will demonstrate at least 4/5 hip abduction MMT bilaterally to improve pelvic stability with gait. MET =4+/5     PLAN  []  Upgrade activities as tolerated no Continue plan of care   []  Discharge due to :    []  Other: Pt has achieved all LTGs. Pt D/C with instructions to continue HEP Independently.      Therapist: Susan Alcala PTA    Date: 4/22/2021 Time: 9:17 AM     Future Appointments   Date Time Provider Laurie Darden   4/22/2021  9:30 AM Elissa Bautista PTA [facility]   5/11/2021 10:30 AM [place] MITRA Varghese [facility] BS AMB   6/15/2021 11:15 AM JEY Kaba BS AMB

## 2021-04-27 ENCOUNTER — APPOINTMENT (OUTPATIENT)
Dept: PHYSICAL THERAPY | Age: 56
End: 2021-04-27
Payer: MEDICAID

## 2021-04-29 ENCOUNTER — APPOINTMENT (OUTPATIENT)
Dept: PHYSICAL THERAPY | Age: 56
End: 2021-04-29
Payer: MEDICAID

## 2021-05-10 NOTE — PROGRESS NOTES
[facility] Micky  1965     HISTORY OF PRESENT ILLNESS  Anmol Berry is a 50 y.o. female who presents today for evaluation S/P Right shoulder arthroscopic rotator cuff repair, biceps tenotomy and glenohumeral debridment on 7/13/20. Patient has been going to PT and finished at this point. Describes pain as a 4/10. She has achy pain in the front of her shoulder. She also has pain radiating down the back of her right arm and down her back. She also has pain in her neck at times. She is having bilateral knee pain for the last few weeks. She is in PT for her left knee but her right knee is hurting now. The pain is sharp and achy at times. Her right knee is worse than the left. The pain is worse with getting up and down from a chair. She also has pain with stairs. The pain is located on the anterior aspect of her knees and medial aspect as well. She has not had cortisone injections or surgery for her knees. She is in PT for her left knee but hasn't seen much benefit. She has tried OTC pain medications and creams to help which help a little but not much. Patient denies any fever, chills, chest pain, shortness of breath or calf pain. There are no new illness or injuries to report since last seen in the office. PHYSICAL EXAM:   Visit Vitals  Pulse 83   Temp 97.1 °F (36.2 °C)   Ht 5' 4\" (1.626 m)   Wt 230 lb (104.3 kg)   SpO2 98%   BMI 39.48 kg/m²      The patient is a well-developed, well-nourished female in no acute distress. The patient is alert and oriented times three. The patient appears to be well groomed.  Mood and affect are normal.  ORTHOPEDIC EXAM of right shoulder:  Inspection: swelling not present,  Bruising not present  Incision well healed  Passive glenohumeral abduction 0-90, 180 PFF, 180 AFF, 70 PER, IR mid thoracic  Stability: Stable  Strength: gentle rotator cuff testing without pain or weakness  2+ distal pulses    Examination Left knee Right knee   Skin Intact Intact   Range of motion 0-130 0-130   Effusion - -   Medial joint line tenderness ++ -   Lateral joint line tenderness - -   Tenderness Pes Bursa - -   Tenderness insertion MCL - -   Tenderness insertion LCL - -   Heavens - -   Patella crepitus + +   Patella grind - -   Lachman Not assessed Not assessed   Pivot shift Not assessed Not assessed   Anterior drawer Not assessed Not assessed   Posterior drawer Not assessed Not assessed   Varus stress - -   Valgus stress - -   Neurovascular Intact Intact   Calf Swelling and Tenderness to Palpation - -   Nicole's Test - -   Hamstring Cord Tightness - -       PROCEDURE: After sterile prep, 3 cc of Xylocaine and 1 cc of Celestone were injected into the bilateral  Intraarticular knees. [facility] PROCEDURE PROGRESS NOTE        Chart reviewed for the following:  Lashon SOLER PA, have reviewed the History, Physical and updated the Allergic reactions for Marie Bergman performed immediately prior to start of procedure:  Lashon SOLER PA-C, have performed the following reviews on Nguyen Mulligan prior to the start of the procedure:            * Patient was identified by name and date of birth   * Agreement on procedure being performed was verified  * Risks and Benefits explained to the patient  * Procedure site verified and marked as necessary  * Patient was positioned for comfort  * Consent was signed and verified     Time: 11:25 AM    Date of procedure: 5/11/2021    Procedure performed by:  MITRA Hewitt    Provider assisted by: (see medication administration)    How tolerated by patient: tolerated the procedure well with no complications    Comments: none    XR: 3 views of bilateral knees 5/11/21 show mild OA of the medial and patellofemoral compartments. No acute bony abnormalities.     IMPRESSION:  S/P Right shoulder arthroscopic rotator cuff repair, biceps tenotomy and glenohumeral debridement, bilateral knees patellofemoral syndrome, mild OA bilateral knees    PLAN:   Pt doing well post operatively  Her ROM looks great today. I think some of her shoulder pain is coming from her neck. Will further evaluate this at her next visit if it doesn't improve. She will continue volteran gel and diclofenac to help with discomfort. She is finished with PT at this point. She will transition HEP. Stressed to patient that nothing causes an increase in pain. Pt not given a refill of pain medication today. I think her knee pain is coming from mild OA and patellofemoral syndrome. I have recommended a cortisone injection today. She tolerated it well. Will order PT at her next visit since she has some upcoming procedures that will prevent her from going. She will continue OTC medications and creams to help with pain. RTC 6 weeks will order PT for knees at that point and get neck xrays if shoulder pain continues.     Anna Kenney [facility]

## 2021-05-11 ENCOUNTER — OFFICE VISIT (OUTPATIENT)
Dept: ORTHOPEDIC SURGERY | Age: 56
End: 2021-05-11
Payer: MEDICAID

## 2021-05-11 VITALS
OXYGEN SATURATION: 98 % | HEART RATE: 83 BPM | BODY MASS INDEX: 39.27 KG/M2 | TEMPERATURE: 97.1 F | WEIGHT: 230 LBS | HEIGHT: 64 IN

## 2021-05-11 DIAGNOSIS — M22.2X1 PATELLOFEMORAL SYNDROME, BILATERAL: ICD-10-CM

## 2021-05-11 DIAGNOSIS — M25.561 ACUTE PAIN OF BOTH KNEES: Primary | ICD-10-CM

## 2021-05-11 DIAGNOSIS — S46.011A TRAUMATIC COMPLETE TEAR OF RIGHT ROTATOR CUFF, INITIAL ENCOUNTER: ICD-10-CM

## 2021-05-11 DIAGNOSIS — M25.562 ACUTE PAIN OF BOTH KNEES: Primary | ICD-10-CM

## 2021-05-11 DIAGNOSIS — M17.0 PRIMARY OSTEOARTHRITIS OF KNEES, BILATERAL: ICD-10-CM

## 2021-05-11 DIAGNOSIS — Z98.890 STATUS POST ARTHROSCOPY OF RIGHT SHOULDER: ICD-10-CM

## 2021-05-11 DIAGNOSIS — M22.2X2 PATELLOFEMORAL SYNDROME, BILATERAL: ICD-10-CM

## 2021-05-11 PROCEDURE — 20610 DRAIN/INJ JOINT/BURSA W/O US: CPT | Performed by: ORTHOPAEDIC SURGERY

## 2021-05-11 PROCEDURE — 73562 X-RAY EXAM OF KNEE 3: CPT | Performed by: ORTHOPAEDIC SURGERY

## 2021-05-11 PROCEDURE — 99214 OFFICE O/P EST MOD 30 MIN: CPT | Performed by: ORTHOPAEDIC SURGERY

## 2021-05-11 RX ORDER — BETAMETHASONE SODIUM PHOSPHATE AND BETAMETHASONE ACETATE 3; 3 MG/ML; MG/ML
6 INJECTION, SUSPENSION INTRA-ARTICULAR; INTRALESIONAL; INTRAMUSCULAR; SOFT TISSUE ONCE
Status: COMPLETED | OUTPATIENT
Start: 2021-05-11 | End: 2021-05-11

## 2021-05-11 RX ADMIN — BETAMETHASONE SODIUM PHOSPHATE AND BETAMETHASONE ACETATE 6 MG: 3; 3 INJECTION, SUSPENSION INTRA-ARTICULAR; INTRALESIONAL; INTRAMUSCULAR; SOFT TISSUE at 11:24

## 2021-05-13 DIAGNOSIS — G47.00 INSOMNIA, UNSPECIFIED TYPE: ICD-10-CM

## 2021-05-13 DIAGNOSIS — F17.200 NICOTINE DEPENDENCE, UNCOMPLICATED, UNSPECIFIED NICOTINE PRODUCT TYPE: ICD-10-CM

## 2021-05-13 DIAGNOSIS — F41.9 ANXIETY AND DEPRESSION: ICD-10-CM

## 2021-05-13 DIAGNOSIS — E11.9 TYPE 2 DIABETES MELLITUS WITHOUT COMPLICATION, WITHOUT LONG-TERM CURRENT USE OF INSULIN (HCC): ICD-10-CM

## 2021-05-13 DIAGNOSIS — F32.A ANXIETY AND DEPRESSION: ICD-10-CM

## 2021-05-13 RX ORDER — BUPROPION HYDROCHLORIDE 150 MG/1
TABLET, EXTENDED RELEASE ORAL
Qty: 60 TAB | Refills: 11 | Status: SHIPPED | OUTPATIENT
Start: 2021-05-13 | End: 2021-06-22

## 2021-05-13 RX ORDER — TRAZODONE HYDROCHLORIDE 150 MG/1
TABLET ORAL
Qty: 30 TAB | Refills: 11 | Status: SHIPPED | OUTPATIENT
Start: 2021-05-13 | End: 2021-07-09

## 2021-05-13 RX ORDER — METFORMIN HYDROCHLORIDE 500 MG/1
TABLET, EXTENDED RELEASE ORAL
Qty: 30 TAB | Refills: 11 | Status: SHIPPED | OUTPATIENT
Start: 2021-05-13 | End: 2021-06-22 | Stop reason: SDUPTHER

## 2021-05-14 DIAGNOSIS — E78.5 HYPERLIPIDEMIA, UNSPECIFIED HYPERLIPIDEMIA TYPE: ICD-10-CM

## 2021-05-14 RX ORDER — PRAVASTATIN SODIUM 40 MG/1
TABLET ORAL
Qty: 30 TAB | Refills: 11 | Status: SHIPPED | OUTPATIENT
Start: 2021-05-14 | End: 2021-07-09

## 2021-06-07 DIAGNOSIS — G89.29 CHRONIC RIGHT SHOULDER PAIN: ICD-10-CM

## 2021-06-07 DIAGNOSIS — M25.511 CHRONIC RIGHT SHOULDER PAIN: ICD-10-CM

## 2021-06-07 RX ORDER — DICLOFENAC SODIUM 10 MG/G
GEL TOPICAL
Qty: 100 G | Refills: 11 | Status: SHIPPED | OUTPATIENT
Start: 2021-06-07

## 2021-06-07 NOTE — PROGRESS NOTES
[facility] PHYSICAL THERAPY  [address] [place] [address] [address]rena, [address] - Phone: [phone]  Fax: [phone] SUMMARY FOR PHYSICAL THERAPY          Patient Name: Laurie Phillips : 1965   Treatment/Medical Diagnosis: Pain in left knee [M25.562]  Right shoulder pain [M25.511]   Onset Date: DOS Shoulder 2020  Knee     Referral Source: Andrzej Davison [facility]): 3/2/21   Prior Hospitalization: See Medical History Provider #:  [id number]   Prior Level of Function: Independent with ADL's   Comorbidities: Depression, DM, HTN, asthma   Medications: Verified on Patient Summary List   Vis[facility] from [facility]: 34 Missed Visits: 3     1. Patient will be independent with HEP to self-manage and prevent symptoms upon DC. Status at last Eval: compliant  Current Status: (I) with HEP yes   2. Patient will ascend and descend 12 stairs in a reciprocal pattern and max 1 UE assist to improve efficiency with community navigation. Status at last Eval: Reciprocal requires bilateral UE assist Current Status: see note yes   3. Patient will demonstrate at least 4/5 hip abduction MMT bilaterally to improve pelvic stability with gait. Status at last Eval: 4-/5 Current Status: 4+/5 yes       SUMMARY OF TREATMENT  Patient's POC has consisted of therex, therapeutic activities, manual therapy prn, modalities prn, pt. education, and a comprehensive HEP. Treatment strategies used to address functional mobility deficits, ROM deficits, strength deficits, analyze and address soft tissue restrictions, analyze and cue movement patterns, analyze and modify body mechanics/ergonomics, assess and modify postural abnormalities and instruct in home and community integration. Key Functional Changes/Progress: Ghislaine Cochran has made excellent progress as evidence by achieving all LTGs. Pt reports compliance and (I) with HEP with no concerns or questions at this time.  Pt's sit <>std test x 5: 17\"x 3, indicating decrease fall risk. Pt is (I) negotiating up and down 4 steps x 3  with no Hand Rails safely with reciprocal pattern. Pt increased hip abd strength from 4-/5 to 4+/5 allowing pt to increase ambulation tolerance to 30 mins without increase pain. Pt reports 70% overall improvement since [facility]. Assessments/Recommendations: Pt has achieved all LTGs. Pt D/C with instructions to continue HEP Independently. If you have any questions/comments please contact us directly at [phone]. Thank you for allowing us to assist in the care of your patient. Therapist Signature: NEEL Mendoza \"BJ\" Reilly Alves, JONAST, Cert. MDT, Cert. DN, Cert. SMT, Dip. Osteopractic Date: 6/7/2021     Reporting Period:    4/1/21-4/22/21 Time: 8:23 AM   NOTE TO PHYSICIAN:  PLEASE COMPLETE THE ORDERS BELOW AND FAX TO   [facility] Physical Therapy: [phone]. If you are unable to process this request in 24 hours please contact our office: [phone].    ___ I have read the above report and request that my patient continue as recommended.   ___ I have read the above report and request that my patient continue therapy with the following changes/special instructions: ________________________________________________   ___ I have read the above report and request that my patient be discharged from therapy.      Physician Signature:        Date:       Time:       Valerie Castelan

## 2021-06-08 LAB — COLONOSCOPY, EXTERNAL: NORMAL

## 2021-06-21 NOTE — PROGRESS NOTES
David Chris Micky  1965     HISTORY OF PRESENT ILLNESS  Soy Zeng is a 50 y.o. female who presents today for evaluation S/P Right shoulder arthroscopic rotator cuff repair, biceps tenotomy and glenohumeral debridment on 7/13/20. Patient has been going to PT and finished at this point. Describes pain as a 0/10. She has achy pain in the front of her shoulder and occasional sharp pain but overall doing well. She works on her exercises at home. She still feels like she doesn't have a lot of strength in her shoulder. She is having bilateral knee pain despite cortisone injections. She reports 3 weeks of relief from this. Her pain is 5/10 today. The pain is still located on the front of her knees. Worse with stairs and getting up from a seat. She also has trouble standing for long periods of time waiting for the bus and would like an order for a walker to help. She has been in PT for both her knees and is now having numbness, tingling, burning of both feet. She was told it is diabetic neuropathy and wanted to discuss this. She has tried OTC pain medications and creams to help which help a little but not much. Patient denies any fever, chills, chest pain, shortness of breath or calf pain. There are no new illness or injuries to report since last seen in the office. PHYSICAL EXAM:   Visit Vitals  Pulse 85   Temp 97.5 °F (36.4 °C)   Resp 15   Ht 5' 4\" (1.626 m)   Wt 229 lb (103.9 kg)   SpO2 98%   BMI 39.31 kg/m²      The patient is a well-developed, well-nourished female in no acute distress. The patient is alert and oriented times three. The patient appears to be well groomed.  Mood and affect are normal.  ORTHOPEDIC EXAM of right shoulder:  Inspection: swelling not present,  Bruising not present  Incision well healed  Passive glenohumeral abduction 0-90, 180 PFF, 180 AFF, 70 PER, IR mid thoracic  Stability: Stable  Strength: gentle rotator cuff testing without pain but some weakness  2+ distal pulses    Examination Left knee Right knee   Skin Intact Intact   Range of motion 0-130 0-130   Effusion - -   Medial joint line tenderness ++ -   Lateral joint line tenderness - -   Tenderness Pes Bursa - -   Tenderness insertion MCL - -   Tenderness insertion LCL - -   Heavens - -   Patella crepitus + +   Patella grind - -   Lachman Not assessed Not assessed   Pivot shift Not assessed Not assessed   Anterior drawer Not assessed Not assessed   Posterior drawer Not assessed Not assessed   Varus stress - -   Valgus stress - -   Neurovascular Intact Intact   Calf Swelling and Tenderness to Palpation - -   Nicole's Test - -   Hamstring Cord Tightness - -       XR: 3 views of bilateral knees 5/11/21 show mild OA of the medial and patellofemoral compartments. No acute bony abnormalities. IMPRESSION:  S/P Right shoulder arthroscopic rotator cuff repair, biceps tenotomy and glenohumeral debridement, bilateral knees patellofemoral syndrome, mild OA bilateral knees    PLAN:   Pt doing well post operatively  Her ROM looks great today. She will continue exercises working on strengthening. She will continue volteran gel and diclofenac to help with discomfort. She is finished with PT at this point. She will transition HEP. Stressed to patient that nothing causes an increase in pain. RTC PRN for shoulder    I think her knee pain is coming from mild OA and patellofemoral syndrome. Since the cortisone gave some relief but did not last I will put in for authorization for the HA series. She will continue PT and exercises at home. She will continue OTC medications and creams to help with pain. She was given a DME order for a walker with a seat to help her get around and when she has long waits for the bus. She will follow up with her PCP to discuss possible diabetic neuropathy and treatment for this.   RTC for HA series      MIREYA Suazo [facility]

## 2021-06-22 ENCOUNTER — OFFICE VISIT (OUTPATIENT)
Dept: ORTHOPEDIC SURGERY | Age: 56
End: 2021-06-22
Payer: MEDICAID

## 2021-06-22 VITALS
BODY MASS INDEX: 39.09 KG/M2 | HEIGHT: 64 IN | OXYGEN SATURATION: 98 % | TEMPERATURE: 97.5 F | WEIGHT: 229 LBS | RESPIRATION RATE: 15 BRPM | HEART RATE: 85 BPM

## 2021-06-22 DIAGNOSIS — Z98.890 STATUS POST ARTHROSCOPY OF RIGHT SHOULDER: ICD-10-CM

## 2021-06-22 DIAGNOSIS — S46.011A TRAUMATIC COMPLETE TEAR OF RIGHT ROTATOR CUFF, INITIAL ENCOUNTER: ICD-10-CM

## 2021-06-22 DIAGNOSIS — M22.2X2 PATELLOFEMORAL SYNDROME, BILATERAL: Primary | ICD-10-CM

## 2021-06-22 DIAGNOSIS — M17.0 PRIMARY OSTEOARTHRITIS OF KNEES, BILATERAL: ICD-10-CM

## 2021-06-22 DIAGNOSIS — M22.2X1 PATELLOFEMORAL SYNDROME, BILATERAL: Primary | ICD-10-CM

## 2021-06-22 PROCEDURE — 99214 OFFICE O/P EST MOD 30 MIN: CPT | Performed by: ORTHOPAEDIC SURGERY

## 2021-07-06 DIAGNOSIS — K21.9 GASTROESOPHAGEAL REFLUX DISEASE, UNSPECIFIED WHETHER ESOPHAGITIS PRESENT: Primary | ICD-10-CM

## 2021-07-06 DIAGNOSIS — J45.909 PERSISTENT ASTHMA WITHOUT COMPLICATION, UNSPECIFIED ASTHMA SEVERITY: ICD-10-CM

## 2021-07-06 RX ORDER — MONTELUKAST SODIUM 10 MG/1
10 TABLET ORAL DAILY
Qty: 90 TABLET | Refills: 1 | Status: SHIPPED | OUTPATIENT
Start: 2021-07-06 | End: 2021-09-27

## 2021-07-06 RX ORDER — FAMOTIDINE 20 MG/1
20 TABLET, FILM COATED ORAL 2 TIMES DAILY
Qty: 180 TABLET | Refills: 1 | Status: SHIPPED
Start: 2021-07-06 | End: 2021-08-31

## 2021-07-08 ENCOUNTER — TELEPHONE (OUTPATIENT)
Dept: FAMILY MEDICINE CLINIC | Age: 56
End: 2021-07-08

## 2021-07-08 NOTE — TELEPHONE ENCOUNTER
Spoke with [facility] yesterday. Informed them that the patient has not been seen by Dr. Anmol Ribeiro and the order is from [facility].

## 2021-07-09 DIAGNOSIS — G47.00 INSOMNIA, UNSPECIFIED TYPE: ICD-10-CM

## 2021-07-09 DIAGNOSIS — E78.5 HYPERLIPIDEMIA, UNSPECIFIED HYPERLIPIDEMIA TYPE: ICD-10-CM

## 2021-07-09 RX ORDER — PRAVASTATIN SODIUM 40 MG/1
TABLET ORAL
Qty: 90 TABLET | Refills: 0 | Status: SHIPPED | OUTPATIENT
Start: 2021-07-09 | End: 2021-10-07

## 2021-07-09 RX ORDER — TRAZODONE HYDROCHLORIDE 150 MG/1
TABLET ORAL
Qty: 90 TABLET | Refills: 0 | Status: SHIPPED | OUTPATIENT
Start: 2021-07-09 | End: 2021-10-07

## 2021-07-13 ENCOUNTER — OFFICE VISIT (OUTPATIENT)
Dept: ORTHOPEDIC SURGERY | Age: 56
End: 2021-07-13
Payer: MEDICAID

## 2021-07-13 VITALS
TEMPERATURE: 97.3 F | BODY MASS INDEX: 39.78 KG/M2 | HEIGHT: 64 IN | OXYGEN SATURATION: 98 % | HEART RATE: 86 BPM | WEIGHT: 233 LBS

## 2021-07-13 DIAGNOSIS — M17.0 PRIMARY OSTEOARTHRITIS OF KNEES, BILATERAL: Primary | ICD-10-CM

## 2021-07-13 PROCEDURE — 20610 DRAIN/INJ JOINT/BURSA W/O US: CPT | Performed by: ORTHOPAEDIC SURGERY

## 2021-07-13 NOTE — PROGRESS NOTES
Patient: Sabina Gutierrez                MRN: [id number]       SSN: [id number]  YOB: 1965        AGE: 50 y.o. SEX: female  Body mass index is 39.99 kg/m². PCP: Aron Mclean NP  07/13/21    Chief Complaint   Patient presents with    Knee Pain     bilateral knees       HISTORY:  Sabina Gutierrez is a 50 y.o. female who is seen for reevaluation of Bilateral knee pain here for 1st injection of euflexxa. PROCEDURE:  Patient's Bilateral intraarticular knee, after timeout under sterile conditions, was injected with 2 cc of Euflexxa. [facility]  OFFICE PROCEDURE PROGRESS NOTE        Chart reviewed for the following:   Angelique SOLER PA-C, have reviewed the History, Physical and updated the Allergic reactions for Marie Bergman performed immediately prior to start of procedure:   Angelique SOLER PA-C, have performed the following reviews on Sabina Gutierrez prior to the start of the procedure:            * Patient was identified by name and date of birth   * Agreement on procedure being performed was verified  * Risks and Benefits explained to the patient  * Procedure site verified and marked as necessary  * Patient was positioned for comfort  * Consent was signed and verified     Time: 10:10 AM       Date of procedure: 7/13/2021    Procedure performed by:  MITRA Baldwin    Provider assisted by: None     How tolerated by patient: tolerated the procedure well with no complications    Comments: none    IMPRESSION:     ICD-10-CM ICD-9-CM    1. Primary osteoarthritis of knees, bilateral  M17.0 715.16 sodium hyaluronate (SUPARTZ FX/EUFLEXXA/HYALGAN) 10 mg/mL injection syrg 20 mg      DRAIN/INJECT LARGE JOINT/BURSA        PLAN:  Ms. Rod Bray will return in one week for her second Euflexxa injection.       Anna Baldwin [facility]ist

## 2021-07-21 ENCOUNTER — OFFICE VISIT (OUTPATIENT)
Dept: ORTHOPEDIC SURGERY | Age: 56
End: 2021-07-21
Payer: MEDICAID

## 2021-07-21 VITALS
BODY MASS INDEX: 38.93 KG/M2 | OXYGEN SATURATION: 98 % | HEART RATE: 94 BPM | WEIGHT: 228 LBS | HEIGHT: 64 IN | TEMPERATURE: 97.8 F

## 2021-07-21 DIAGNOSIS — M17.0 PRIMARY OSTEOARTHRITIS OF KNEES, BILATERAL: Primary | ICD-10-CM

## 2021-07-21 PROCEDURE — 20610 DRAIN/INJ JOINT/BURSA W/O US: CPT | Performed by: ORTHOPAEDIC SURGERY

## 2021-07-21 RX ORDER — TRIAMCINOLONE ACETONIDE 40 MG/ML
40 INJECTION, SUSPENSION INTRA-ARTICULAR; INTRAMUSCULAR ONCE
Status: CANCELLED | OUTPATIENT
Start: 2021-07-21 | End: 2021-07-21

## 2021-07-21 NOTE — PROGRESS NOTES
Patient: Kwaku Rodrigues                MRN: [id number]       SSN: [id number]  YOB: 1965        AGE: 50 y.o. SEX: female  Body mass index is 39.14 kg/m². PCP: Anne Ross NP  07/21/21    Chief Complaint: Bilateral knee pain 5/10    HPI: Kwaku Rodrigues is a 50 y.o. female patient who returns to the office today for Euflexxa injection #2 into both knees. Last week she was injected in both knees and says she has felt slight improvement. Pain today is 5 out of 10. Past Medical History:   Diagnosis Date    Allergic rhinitis     Anxiety and depression     Asthma 11/3/2010    Chronic pain in right shoulder     Depression 10/19/2015    Diabetes ([address]     Eczema 10/19/2015    Head pain 4/28/2014    HTN (hypertension)     Multiple environmental allergies     Nicotine dependence     Syphilis in female 3/2/2015       Family History   Problem Relation Age of Onset    Breast Cancer Maternal Grandmother     Cancer Mother        Current Outpatient Medications   Medication Sig Dispense Refill    traZODone (DESYREL) 150 mg tablet TAKE 1 TABLET BY MOUTH EVERY DAY AT NIGHT 90 Tablet 0    pravastatin (PRAVACHOL) 40 mg tablet TAKE 1 TABLET BY MOUTH EVERY DAY AT NIGHT 90 Tablet 0    montelukast (SINGULAIR) 10 mg tablet Take 1 Tablet by mouth daily. 90 Tablet 1    budesonide (PULMICORT FLEXHALER) 90 mcg/actuation aepb inhaler Take 2 Puffs by inhalation two (2) times a day. 3 Inhaler 1    buPROPion SR (WELLBUTRIN SR) 150 mg SR tablet Take 1 Tablet by mouth two (2) times a day. 180 Tablet 0    metFORMIN ER (GLUCOPHAGE XR) 500 mg tablet Take 1 Tablet by mouth daily (with dinner).  30 Tablet 11    diclofenac (VOLTAREN) 1 % gel APPLY 4 GRAMS TO AFFECTED AREA FOUR TIMES DAILY 100 g 11    fluticasone propionate (FLONASE) 50 mcg/actuation nasal spray SPRAY 2 SPRAYS IN EACH NOSTRIL ONCE DAILY 16 g 11    losartan (COZAAR) 25 mg tablet TAKE 1 TABLET BY MOUTH ONCE DAILY 30 Tab 11    albuterol (PROVENTIL HFA, VENTOLIN HFA, PROAIR HFA) 90 mcg/actuation inhaler Take 2 Puffs by inhalation every six (6) hours as needed for Wheezing. 3 Inhaler 2    hydroCHLOROthiazide (HYDRODIURIL) 25 mg tablet TAKE 1 TABLET BY MOUTH EVERY DAY 90 Tab 1    ketoconazole (NIZORAL) 2 % topical cream Apply once daily to cover the affected and immediate surrounding area for 2 weeks 30 g 2    famotidine (PEPCID) 20 mg tablet Take 1 Tablet by mouth two (2) times a day. (Patient not taking: Reported on 7/13/2021) 180 Tablet 1    diclofenac EC (VOLTAREN) 75 mg EC tablet Take 1 Tab by mouth two (2) times daily (with meals). (Patient not taking: Reported on 7/13/2021) 60 Tab 0     Current Facility-Administered Medications   Medication Dose Route Frequency Provider Last Rate Last Admin    sodium hyaluronate (SUPARTZ FX/EUFLEXXA/HYALGAN) 10 mg/mL injection syrg 20 mg  20 mg Intra artICUlar ONCE Jeff Reid MD           Allergies   Allergen Reactions    Aspirin Nausea and Vomiting    Lisinopril Cough    Prednisone Swelling       Past Surgical History:   Procedure Laterality Date    ENDOSCOPY, COLON, DIAGNOSTIC      HX TUBAL LIGATION Bilateral     [place] ANESTH,SURGERY OF SHOULDER         Social History     Socioeconomic History    Marital status:      Spouse name: Not on file    Number of children: Not on file    Years of education: Not on file    Highest education level: Not on file   Occupational History    Not on file   Tobacco Use    Smoking status: Current Every Day Smoker     Packs/day: 0.50     Years: 35.00     Pack years: 17.50     Types: Cigarettes    Smokeless tobacco: Never Used    Tobacco comment: quit in january, smokes one occ   Vaping Use    Vaping Use: Never used   Substance and Sexual Activity    Alcohol use:  Yes     Alcohol/week: 2.0 standard drinks     Types: 2 Shots of liquor per week    Drug use: Yes     Types: Cocaine    Sexual activity: Yes     Partners: Male Other Topics Concern    Not on file   Social History Narrative    Not on file     Social Determinants of Health     Financial Resource Strain:     Difficulty of Paying Living Expenses:    Food Insecurity:     Worried About Running Out of Food in the Last Year:     [address] in the Last Year:    Transportation Needs:     Lack of Transportation (Medical):  Lack of Transportation (Non-Medical):    Physical Activity:     Days of Exercise per Week:     Minutes of Exercise per Session:    Stress:     Feeling of Stress :    Social Connections:     Frequency of Communication with Friends and Family:     Frequency of Social Gatherings with Friends and Family:     Attends Hoahaoism Services:     Active Member of Clubs or Organizations:     Attends Club or Organization Meetings:     Marital Status:    Intimate Partner Violence:     Fear of Current or Ex-Partner:     Emotionally Abused:     Physically Abused:     Sexually Abused:        REVIEW OF SYSTEMS:      No changes from previous review of systems unless noted. PHYSICAL EXAMINATION:  Visit Vitals  Pulse 94   Temp 97.8 °F (36.6 °C) (Temporal)   Ht 5' 4\" (1.626 m)   Wt 228 lb (103.4 kg)   SpO2 98%   BMI 39.14 kg/m²     Body mass index is 39.14 kg/m². GENERAL: Alert and oriented x3, in no acute distress. HEENT: Normocephalic, atraumatic. RESP: Non labored breathing. SKIN: No rashes or lesions noted.    Knee Examination      R   L  Effusion   -   -  Warmth   -   -  Erythema   -   -  ROM   Extension  Full   Full   Flexion   Full   Full  Tenderness   Medial Joint  +   +   Lateral Joint  -   -   Posterior knee  -   -  Strength   Quad   5   5   Hamstring  5   5  Crepitus   Tibiofemoral   +   +   Patellofemoral  -   -  Instability   Anterior  -   -   Posterior  -   -   Patellofemoral  -   -  Lachman's   -   -  Anterior Drawer  -   -  Posterior Drawer  -   -  Heaven's   Pain   -   -   Locking  -   -  Calf TTP   -   -  Straight Leg Raise  -   -      IMAGING:  No imaging    ASSESSMENT & PLAN  Diagnosis: Bilateral knee osteoarthritis    Joel Shaw is here today for Euflexxa injection #2 into both knees. Tolerated well. Follow-up next week for the third shot. [facility]  OFFICE PROCEDURE PROGRESS NOTE        Chart reviewed for the following:   Jorge Laguna MD, have reviewed the History, Physical and updated the Allergic reactions for [address] performed immediately prior to start of procedure:   Jorge Laguna MD, have performed the following reviews on Janis Rothman prior to the start of the procedure:            * Patient was identified by name and date of birth   * Agreement on procedure being performed was verified  * Risks and Benefits explained to the patient  * Procedure site verified and marked as necessary  * Patient was positioned for comfort  * Consent was signed and verified    Time: 2:21 PM    Location: Bilateral knee joint intra-articular injections    Euflexxa 1% sodium hyaluronate 2ccs    Date of procedure: 7/21/2021    Procedure performed by:  Rosilyn Osler, MD    Provider assisted by: Ady Lou LPN    Patient assisted by: self    How tolerated by patient: tolerated the procedure well with no complications    Post Procedural Pain Scale: 0 - No Hurt    Comments: none        Electronically signed by: Rosilyn Osler, MD    Note: This note was completed using voice recognition software.   Any typographical/name errors or mistakes are unintentional.

## 2021-07-30 ENCOUNTER — OFFICE VISIT (OUTPATIENT)
Dept: ORTHOPEDIC SURGERY | Age: 56
End: 2021-07-30
Payer: MEDICAID

## 2021-07-30 VITALS
WEIGHT: 230 LBS | HEART RATE: 90 BPM | OXYGEN SATURATION: 99 % | BODY MASS INDEX: 39.27 KG/M2 | TEMPERATURE: 96.9 F | HEIGHT: 64 IN

## 2021-07-30 DIAGNOSIS — M17.0 PRIMARY OSTEOARTHRITIS OF KNEES, BILATERAL: Primary | ICD-10-CM

## 2021-07-30 PROCEDURE — 99024 POSTOP FOLLOW-UP VISIT: CPT | Performed by: ORTHOPAEDIC SURGERY

## 2021-07-30 PROCEDURE — 20610 DRAIN/INJ JOINT/BURSA W/O US: CPT | Performed by: ORTHOPAEDIC SURGERY

## 2021-07-30 NOTE — PROGRESS NOTES
Patient: Neil Zimmerman                MRN: [id number]       SSN: [id number]  YOB: 1965        AGE: 50 y.o. SEX: female  Body mass index is 39.48 kg/m². PCP: Shannan Calvo NP  07/30/21    Chief Complaint: Bilateral knee pain follow up 5/10    HPI: Neil Zimmerman is a 50 y.o. female patient who returns to the office today for her bilateral knee pain. She is here today for Euflexxa injection #3 into both knees. The pain is slightly improved. Past Medical History:   Diagnosis Date    Allergic rhinitis     Anxiety and depression     Asthma 11/3/2010    Chronic pain in right shoulder     Depression 10/19/2015    Diabetes ([address]     Eczema 10/19/2015    Head pain 4/28/2014    HTN (hypertension)     Multiple environmental allergies     Nicotine dependence     Syphilis in female 3/2/2015       Family History   Problem Relation Age of Onset    Breast Cancer Maternal Grandmother     Cancer Mother        Current Outpatient Medications   Medication Sig Dispense Refill    traZODone (DESYREL) 150 mg tablet TAKE 1 TABLET BY MOUTH EVERY DAY AT NIGHT 90 Tablet 0    pravastatin (PRAVACHOL) 40 mg tablet TAKE 1 TABLET BY MOUTH EVERY DAY AT NIGHT 90 Tablet 0    montelukast (SINGULAIR) 10 mg tablet Take 1 Tablet by mouth daily. 90 Tablet 1    budesonide (PULMICORT FLEXHALER) 90 mcg/actuation aepb inhaler Take 2 Puffs by inhalation two (2) times a day. 3 Inhaler 1    buPROPion SR (WELLBUTRIN SR) 150 mg SR tablet Take 1 Tablet by mouth two (2) times a day. 180 Tablet 0    metFORMIN ER (GLUCOPHAGE XR) 500 mg tablet Take 1 Tablet by mouth daily (with dinner).  30 Tablet 11    diclofenac (VOLTAREN) 1 % gel APPLY 4 GRAMS TO AFFECTED AREA FOUR TIMES DAILY 100 g 11    fluticasone propionate (FLONASE) 50 mcg/actuation nasal spray SPRAY 2 SPRAYS IN EACH NOSTRIL ONCE DAILY 16 g 11    losartan (COZAAR) 25 mg tablet TAKE 1 TABLET BY MOUTH ONCE DAILY 30 Tab 11    albuterol (PROVENTIL HFA, VENTOLIN HFA, PROAIR HFA) 90 mcg/actuation inhaler Take 2 Puffs by inhalation every six (6) hours as needed for Wheezing. 3 Inhaler 2    hydroCHLOROthiazide (HYDRODIURIL) 25 mg tablet TAKE 1 TABLET BY MOUTH EVERY DAY 90 Tab 1    ketoconazole (NIZORAL) 2 % topical cream Apply once daily to cover the affected and immediate surrounding area for 2 weeks 30 g 2    famotidine (PEPCID) 20 mg tablet Take 1 Tablet by mouth two (2) times a day. (Patient not taking: Reported on 7/13/2021) 180 Tablet 1    diclofenac EC (VOLTAREN) 75 mg EC tablet Take 1 Tab by mouth two (2) times daily (with meals). (Patient not taking: Reported on 7/13/2021) 60 Tab 0     Current Facility-Administered Medications   Medication Dose Route Frequency Provider Last Rate Last Admin    sodium hyaluronate (SUPARTZ FX/EUFLEXXA/HYALGAN) 10 mg/mL injection syrg 20 mg  20 mg Intra artICUlar ONCE Claudiaan Lipoma, MD           Allergies   Allergen Reactions    Aspirin Nausea and Vomiting    Lisinopril Cough    Prednisone Swelling       Past Surgical History:   Procedure Laterality Date    ENDOSCOPY, COLON, DIAGNOSTIC      HX TUBAL LIGATION Bilateral     [place] ANESTH,SURGERY OF SHOULDER         Social History     Socioeconomic History    Marital status:      Spouse name: Not on file    Number of children: Not on file    Years of education: Not on file    Highest education level: Not on file   Occupational History    Not on file   Tobacco Use    Smoking status: Current Every Day Smoker     Packs/day: 0.50     Years: 35.00     Pack years: 17.50     Types: Cigarettes    Smokeless tobacco: Never Used    Tobacco comment: quit in january, smokes one occ   Vaping Use    Vaping Use: Never used   Substance and Sexual Activity    Alcohol use:  Yes     Alcohol/week: 2.0 standard drinks     Types: 2 Shots of liquor per week    Drug use: Yes     Types: Cocaine    Sexual activity: Yes     Partners: Male   Other Topics Concern    Not on file   Social History Narrative    Not on file     Social Determinants of Health     Financial Resource Strain:     Difficulty of Paying Living Expenses:    Food Insecurity:     Worried About Running Out of Food in the Last Year:     [address] in the Last Year:    Transportation Needs:     Lack of Transportation (Medical):  Lack of Transportation (Non-Medical):    Physical Activity:     Days of Exercise per Week:     Minutes of Exercise per Session:    Stress:     Feeling of Stress :    Social Connections:     Frequency of Communication with Friends and Family:     Frequency of Social Gatherings with Friends and Family:     Attends Religion Services:     Active Member of Clubs or Organizations:     Attends Club or Organization Meetings:     Marital Status:    Intimate Partner Violence:     Fear of Current or Ex-Partner:     Emotionally Abused:     Physically Abused:     Sexually Abused:        REVIEW OF SYSTEMS:      No changes from previous review of systems unless noted. PHYSICAL EXAMINATION:  Visit Vitals  Pulse 90   Temp 96.9 °F (36.1 °C) (Temporal)   Ht 5' 4\" (1.626 m)   Wt 230 lb (104.3 kg)   SpO2 99%   BMI 39.48 kg/m²     Body mass index is 39.48 kg/m². GENERAL: Alert and oriented x3, in no acute distress. HEENT: Normocephalic, atraumatic. RESP: Non labored breathing. SKIN: No rashes or lesions noted.    Knee Examination      R   L  Effusion   -   -  Warmth   -   -  Erythema   -   -  ROM   Extension  Full   Full   Flexion   Full   Full  Tenderness   Medial Joint  +   +   Lateral Joint  +   +   Posterior knee  -   -  Strength   Quad   5   5   Hamstring  5   5  Crepitus   Tibiofemoral   +   +   Patellofemoral  +   +  Instability   Anterior  -   -   Posterior  -   -   Patellofemoral  -   -  Lachman's   -   -  Anterior Drawer  -   -  Posterior Drawer  -   -  Heaven's   Pain   -   -   Locking  -   -  Calf TTP   -   -  Straight Leg Raise  -   -      IMAGING:  No imaging today    ASSESSMENT & PLAN  Diagnosis: Bilateral knee osteoarthritis    Patrice Armijo is here today for hyaluronic acid injection #3 into both knees. She tolerated this well. Aftercare was discussed. Follow-up as needed. [facility]  OFFICE PROCEDURE PROGRESS NOTE        Chart reviewed for the following:   Baron Dilia MD, have reviewed the History, Physical and updated the Allergic reactions for [address] performed immediately prior to start of procedure:   Baron Dilia MD, have performed the following reviews on Jodee Pinch prior to the start of the procedure:            * Patient was identified by name and date of birth   * Agreement on procedure being performed was verified  * Risks and Benefits explained to the patient  * Procedure site verified and marked as necessary  * Patient was positioned for comfort  * Consent was signed and verified    Time: 1:10 PM    Location: Bilateral knee joint intra-articular injections    Euflexxa 1% sodium hyaluronate 2ccs    Date of procedure: 7/30/2021    Procedure performed by:  Anil Hines MD    Provider assisted by: Eugenia Sauceda LPN    Patient assisted by: self    How tolerated by patient: tolerated the procedure well with no complications    Post Procedural Pain Scale: 0 - No Hurt    Comments: none        Electronically signed by: Anil Hines MD    Note: This note was completed using voice recognition software.   Any typographical/name errors or mistakes are unintentional.

## 2021-08-04 DIAGNOSIS — I10 ESSENTIAL HYPERTENSION WITH GOAL BLOOD PRESSURE LESS THAN 130/80: ICD-10-CM

## 2021-08-05 RX ORDER — HYDROCHLOROTHIAZIDE 25 MG/1
TABLET ORAL
Qty: 90 TABLET | Refills: 1 | Status: SHIPPED | OUTPATIENT
Start: 2021-08-05 | End: 2021-08-31 | Stop reason: SDUPTHER

## 2021-08-31 ENCOUNTER — OFFICE VISIT (OUTPATIENT)
Dept: FAMILY MEDICINE CLINIC | Age: 56
End: 2021-08-31
Payer: MEDICAID

## 2021-08-31 VITALS
TEMPERATURE: 98 F | HEIGHT: 64 IN | RESPIRATION RATE: 18 BRPM | WEIGHT: 235.4 LBS | DIASTOLIC BLOOD PRESSURE: 70 MMHG | SYSTOLIC BLOOD PRESSURE: 116 MMHG | OXYGEN SATURATION: 92 % | BODY MASS INDEX: 40.19 KG/M2 | HEART RATE: 69 BPM

## 2021-08-31 DIAGNOSIS — F17.200 NICOTINE DEPENDENCE, UNCOMPLICATED, UNSPECIFIED NICOTINE PRODUCT TYPE: ICD-10-CM

## 2021-08-31 DIAGNOSIS — E11.9 TYPE 2 DIABETES MELLITUS WITHOUT COMPLICATION, WITHOUT LONG-TERM CURRENT USE OF INSULIN (HCC): Primary | ICD-10-CM

## 2021-08-31 DIAGNOSIS — E78.2 MIXED HYPERLIPIDEMIA: ICD-10-CM

## 2021-08-31 DIAGNOSIS — I10 ESSENTIAL HYPERTENSION WITH GOAL BLOOD PRESSURE LESS THAN 130/80: ICD-10-CM

## 2021-08-31 PROCEDURE — 99214 OFFICE O/P EST MOD 30 MIN: CPT | Performed by: NURSE PRACTITIONER

## 2021-08-31 RX ORDER — LOSARTAN POTASSIUM 25 MG/1
25 TABLET ORAL DAILY
Qty: 90 TABLET | Refills: 1 | Status: SHIPPED | OUTPATIENT
Start: 2021-08-31 | End: 2022-02-13

## 2021-08-31 RX ORDER — BUPROPION HYDROCHLORIDE 150 MG/1
150 TABLET, EXTENDED RELEASE ORAL 2 TIMES DAILY
Qty: 180 TABLET | Refills: 0 | Status: CANCELLED | OUTPATIENT
Start: 2021-08-31

## 2021-08-31 RX ORDER — VARENICLINE TARTRATE 25 MG
KIT ORAL
Qty: 1 DOSE PACK | Refills: 0 | Status: SHIPPED | OUTPATIENT
Start: 2021-08-31 | End: 2021-10-06

## 2021-08-31 RX ORDER — PANTOPRAZOLE SODIUM 40 MG/1
40 TABLET, DELAYED RELEASE ORAL 2 TIMES DAILY
COMMUNITY
Start: 2021-08-24 | End: 2022-08-26

## 2021-08-31 RX ORDER — HYDROCHLOROTHIAZIDE 25 MG/1
TABLET ORAL
Qty: 90 TABLET | Refills: 1 | Status: SHIPPED | OUTPATIENT
Start: 2021-08-31 | End: 2022-02-13

## 2021-08-31 NOTE — PROGRESS NOTES
Room Number 8    Patient would like to discuss ear flushing, Patient is having a hard time hearing    Did you take your medication today ? Yes     1. Have you been to the ER, urgent care clinic since your last visit? Hospitalized since your last visit? No    2. Have you seen or consulted any other health care providers outside of the [address] [place] [address] since your last visit? Include any pap smears or colon screening.  Yes     Health Maintenance Due   Topic Date Due    Eye Exam Retinal or Dilated  Never done    COVID-19 Vaccine (1) Never done    Pneumococcal 0-64 years (1 of 2 - PPSV23) Never done    Shingrix Vaccine Age 50> (1 of 2) Never done    Breast Cancer Screen Mammogram  11/14/2017    PAP AKA CERVICAL CYTOLOGY  06/23/2018    DTaP/Tdap/Td series (2 - Td or Tdap) 11/19/2020    Foot Exam Q1  08/24/2021

## 2021-08-31 NOTE — PROGRESS NOTES
[address] [place] [address]               [phone]      Jodee Rangel is a 50 y.o. female and presents with Follow-up       Assessment/Plan:    Diagnoses and all orders for this visit:    1. Type 2 diabetes mellitus without complication, without long-term current use of insulin (HCC)  -     HEMOGLOBIN A1C WITH EAG; Future  Endorses medication compliance, Have asked him/her to come in for follow-up labs, Endorses symptoms consistent with hyperglycemia and advised to work on her diet and exercise to help manage her DM   2. Essential hypertension with goal blood pressure less than 130/80  -     hydroCHLOROthiazide (HYDRODIURIL) 25 mg tablet; TAKE 1 TABLET BY MOUTH EVERY DAY  -     losartan (COZAAR) 25 mg tablet; Take 1 Tablet by mouth daily.  -     METABOLIC PANEL, COMPREHENSIVE; Future  Endorses medication compliance, Refill provided, Have asked him/her to come in for follow-up labs and Blood pressure stable, continue same medications   3. Mixed hyperlipidemia  -     LIPID PANEL; Future  Endorses medication compliance, Have asked him/her to come in for follow-up labs and Denies abdominal pain or symptoms of myalgia   4. Nicotine dependence, uncomplicated, unspecified nicotine product type  -     varenicline (CHANTIX STARTER MARGIE) 0.5 mg (11)- 1 mg (42) DsPk; Take as indicated on package  she has been on wellbutrin for quite sometime and is still smoking, will try her on chantix. advised to monitor herself for any depression, sleep disturbance or changes in her mood. instructed on how to take the medication. hand outs on smoking cessation provided. she verbalized understanding and is in agreement with this plan of care. Follow-up and Dispositions    · Return in about 4 months (around 12/31/2021) for DM, DM foot, HLD, HTN, 30 min, office only, AND, labs prior. Subjective:    Hypertension:   Patient reports taking medications as instructed.  yes   Medication side effects noted. no  Headache upon wakening. no   Home BP monitoring in range of 201/09'I systolic. Do you experience chest pain/pressure or SOB with exertion? no  Maintain a low salt diet? yes  Key CAD CHF Meds             hydroCHLOROthiazide (HYDRODIURIL) 25 mg tablet (Taking) TAKE 1 TABLET BY MOUTH EVERY DAY    losartan (COZAAR) 25 mg tablet (Taking) Take 1 Tablet by mouth daily. pravastatin (PRAVACHOL) 40 mg tablet (Taking) TAKE 1 TABLET BY MOUTH EVERY DAY AT NIGHT        HLD:  Has been compliant with meds  all of the time  Compliant with low-fat diet. most of the time    Denies myalgias or other side effects.  yes  Cholesterol, total   Date Value Ref Range Status   03/16/2021 150 110 - 200 mg/dL Final     Triglyceride   Date Value Ref Range Status   03/16/2021 99 40 - 149 mg/dL Final     HDL Cholesterol   Date Value Ref Range Status   03/16/2021 42 >=40 mg/dL Final     LDL, calculated   Date Value Ref Range Status   03/16/2021 89 50 - 99 mg/dL Final   ]  Key Antihyperlipidemia Meds             pravastatin (PRAVACHOL) 40 mg tablet (Taking) TAKE 1 TABLET BY MOUTH EVERY DAY AT NIGHT         DMII-   Patient reports medication compliance Daily  Diabetic diet compliance most of the time  Patient monitors blood sugars regularly never   Reports am fasting sugars range does not check   Denies hypoglycemic episodes yes  Denies polyuria, polydipsia, paraesthesia, vision changes? no, increased thirst, increased paresthesia      Diabetic Foot and Eye Exam HM Status   Topic Date Due    Eye Exam  Never done    Diabetic Foot Care  08/24/2021     Hemoglobin A1c   Date Value Ref Range Status   03/16/2021 6.7 (H) 4.8 - 5.6 % Final     Hemoglobin A1c, External   Date Value Ref Range Status   01/08/2020 8.7  Final     Comment:     ce   ]  Creatinine, urine   Date Value Ref Range Status   03/16/2021 69 mg/dL Final     Microalb/Creat ratio (ug/mg creat.)   Date Value Ref Range Status   03/16/2021 24.6 0.0 - 30.0 Final Key Antihyperglycemic Medications             metFORMIN ER (GLUCOPHAGE XR) 500 mg tablet (Taking) Take 1 Tablet by mouth daily (with dinner). ROS:     ROS  As stated in HPI, otherwise all others negative. The problem list was updated as a part of today's visit. Patient Active Problem List   Diagnosis Code    Syphilis in female A53.9    Menopause Z78.0    Eczema L30.9    Anxiety and depression F41.9, F32.9    Mild intermittent asthma J45.20    Severe obesity ([address] E66.01    Essential hypertension with goal blood pressure less than 130/80 I10    Hyperlipidemia E78.5    Non-seasonal allergic rhinitis J30.89    Type 2 diabetes mellitus without complication, without long-term current use of insulin (HCC) E11.9    Chronic right shoulder pain M25.511, G89.29    Nicotine dependence, uncomplicated E85.597       The PSH, FH were reviewed. SH:  Social History     Tobacco Use    Smoking status: Current Every Day Smoker     Packs/day: 0.50     Years: 35.00     Pack years: 17.50     Types: Cigarettes    Smokeless tobacco: Never Used    Tobacco comment: quit in january, smokes one occ   Vaping Use    Vaping Use: Never used   Substance Use Topics    Alcohol use: Yes     Alcohol/week: 2.0 standard drinks     Types: 2 Shots of liquor per week    Drug use: Yes     Types: Cocaine       Medications/Allergies:  Current Outpatient Medications on File Prior to Visit   Medication Sig Dispense Refill    traZODone (DESYREL) 150 mg tablet TAKE 1 TABLET BY MOUTH EVERY DAY AT NIGHT 90 Tablet 0    pravastatin (PRAVACHOL) 40 mg tablet TAKE 1 TABLET BY MOUTH EVERY DAY AT NIGHT 90 Tablet 0    montelukast (SINGULAIR) 10 mg tablet Take 1 Tablet by mouth daily. 90 Tablet 1    budesonide (PULMICORT FLEXHALER) 90 mcg/actuation aepb inhaler Take 2 Puffs by inhalation two (2) times a day. 3 Inhaler 1    metFORMIN ER (GLUCOPHAGE XR) 500 mg tablet Take 1 Tablet by mouth daily (with dinner).  30 Tablet 11    diclofenac (VOLTAREN) 1 % gel APPLY 4 GRAMS TO AFFECTED AREA FOUR TIMES DAILY 100 g 11    fluticasone propionate (FLONASE) 50 mcg/actuation nasal spray SPRAY 2 SPRAYS IN EACH NOSTRIL ONCE DAILY 16 g 11    albuterol (PROVENTIL HFA, VENTOLIN HFA, PROAIR HFA) 90 mcg/actuation inhaler Take 2 Puffs by inhalation every six (6) hours as needed for Wheezing. 3 Inhaler 2    ketoconazole (NIZORAL) 2 % topical cream Apply once daily to cover the affected and immediate surrounding area for 2 weeks 30 g 2    pantoprazole (PROTONIX) 40 mg tablet Take 40 mg by mouth two (2) times a day.  [DISCONTINUED] hydroCHLOROthiazide (HYDRODIURIL) 25 mg tablet TAKE 1 TABLET BY MOUTH EVERY DAY 90 Tablet 1    [DISCONTINUED] famotidine (PEPCID) 20 mg tablet Take 1 Tablet by mouth two (2) times a day. (Patient not taking: Reported on 7/13/2021) 180 Tablet 1    [DISCONTINUED] buPROPion SR (WELLBUTRIN SR) 150 mg SR tablet Take 1 Tablet by mouth two (2) times a day. 180 Tablet 0    [DISCONTINUED] losartan (COZAAR) 25 mg tablet TAKE 1 TABLET BY MOUTH ONCE DAILY 30 Tab 11    [DISCONTINUED] diclofenac EC (VOLTAREN) 75 mg EC tablet Take 1 Tab by mouth two (2) times daily (with meals). (Patient not taking: Reported on 7/13/2021) 60 Tab 0     No current facility-administered medications on file prior to visit. Allergies   Allergen Reactions    Aspirin Nausea and Vomiting    Dog Dander Swelling    Ibuprofen Unknown (comments)     She doesn't know exactly why she is not allowed to take it    Lisinopril Cough    Mold Itching    Pollen Extracts Itching    Prednisone Swelling       Objective:  Visit Vitals  /70 (BP 1 Location: Left arm, BP Patient Position: Sitting, BP Cuff Size: Adult)   Pulse 69   Temp 98 °F (36.7 °C) (Temporal)   Resp 18   Ht 5' 4\" (1.626 m)   Wt 235 lb 6.4 oz (106.8 kg)   SpO2 92%   BMI 40.41 kg/m²    Body mass index is 40.41 kg/m². Physical assessment  Physical Exam  Vitals and nursing note reviewed. Eyes:      Conjunctiva/sclera: Conjunctivae normal.      Pupils: Pupils are equal, round, and reactive to light. Neck:      Vascular: No JVD. Cardiovascular:      Rate and Rhythm: Normal rate and regular rhythm. Heart sounds: Normal heart sounds. No murmur heard. No friction rub. No gallop. Pulmonary:      Effort: Pulmonary effort is normal.      Breath sounds: Normal breath sounds. Musculoskeletal:         General: Normal range of motion. Cervical back: Normal range of motion. Skin:     General: Skin is warm and dry. Neurological:      Mental Status: She is alert and oriented to person, place, and time.    Psychiatric:         Mood and Affect: Affect normal.         Cognition and Memory: Memory normal.         Judgment: Judgment normal.           Labwork and Ancillary Studies:    CBC w/Diff  Lab Results   Component Value Date/Time    WBC 10.7 07/09/2020 08:49 AM    HGB 12.6 07/09/2020 08:49 AM    PLATELET 082 06/21/3698 08:49 AM         Basic Metabolic Profile  Lab Results   Component Value Date/Time    Sodium 142 03/16/2021 01:11 PM    Potassium 4.6 03/16/2021 01:11 PM    Chloride 101 03/16/2021 01:11 PM    CO2 29 03/16/2021 01:11 PM    Anion gap 12.0 03/16/2021 01:11 PM    Glucose 97 03/16/2021 01:11 PM    BUN 17 03/16/2021 01:11 PM    Creatinine 1.0 03/16/2021 01:11 PM    BUN/Creatinine ratio 14 07/09/2020 08:49 AM    GFR est AA >60 07/09/2020 08:49 AM    GFR est non-AA 59 (L) 07/09/2020 08:49 AM    Calcium 10.2 03/16/2021 01:11 PM        Cholesterol  Lab Results   Component Value Date/Time    Cholesterol, total 150 03/16/2021 01:11 PM    HDL Cholesterol 42 03/16/2021 01:11 PM    LDL, calculated 89 03/16/2021 01:11 PM    Triglyceride 99 03/16/2021 01:11 PM    CHOL/HDL Ratio 5.4 (H) 11/05/2019 08:44 AM       Health Maintenance:   Health Maintenance   Topic Date Due    Eye Exam Retinal or Dilated  Never done    Pneumococcal 0-64 years (1 of 2 - PPSV23) Never done    Shingrix Vaccine Age 50> (1 of 2) Never done    Breast Cancer Screen Mammogram  11/14/2017    PAP AKA CERVICAL CYTOLOGY  06/23/2018    DTaP/Tdap/Td series (2 - Td or Tdap) 11/19/2020    Foot Exam Q1  08/24/2021    Flu Vaccine (1) 09/01/2021    A1C test (Diabetic or Prediabetic)  03/16/2022    MICROALBUMIN Q1  03/16/2022    Lipid Screen  03/16/2022    Colorectal Cancer Screening Combo  06/08/2026    Hepatitis C Screening  Completed    COVID-19 Vaccine  Completed       I have discussed the diagnosis with the patient and the intended plan as seen in the above orders. The patient has received an After-Visit Summary and questions were answered concerning future plans. An After Visit Summary was printed and given to the patient. All diagnosis have been discussed with the patient and all of the patient's questions have been answered. Follow-up and Dispositions    · Return in about 4 months (around 12/31/2021) for DM, DM foot, HLD, HTN, 30 min, office only, AND, labs prior. Casey Helton, PARAMJIT-BC  [facility]   [address] [facility] [address]  [id number]

## 2021-08-31 NOTE — PATIENT INSTRUCTIONS
S/s of withdrawal: Typically peak in one to two weeks but may continue for months, increased appetite or weight gain, depressed mood, apathy, insomnia, irritability, frustration, anger, anxiety, difficulty concentrating, restlessness    Learning About Benefits From Quitting Smoking  How does quitting smoking make you healthier? If you're thinking about quitting smoking, you may have a few reasons to be smoke-free. Your health may be one of them. · When you quit smoking, you lower your risks for cancer, lung disease, heart attack, stroke, blood vessel disease, and blindness from macular degeneration. · When you're smoke-free, you get sick less often, and you heal faster. You are less likely to get colds, flu, bronchitis, and pneumonia. · As a nonsmoker, you may find that your mood is better and you are less stressed. When and how will you feel healthier? Quitting has real health benefits that start from day 1 of being smoke-free. And the longer you stay smoke-free, the healthier you get and the better you feel. The first hours  · After just 20 minutes, your blood pressure and heart rate go down. That means there's less stress on your heart and blood vessels. · Within 12 hours, the level of carbon monoxide in your blood drops back to normal. That makes room for more oxygen. With more oxygen in your body, you may notice that you have more energy than when you smoked. After 2 weeks  · Your lungs start to work better. · Your risk of heart attack starts to drop. After 1 month  · When your lungs are clear, you cough less and breathe deeper, so it's easier to be active. · Your sense of taste and smell return. That means you can enjoy food more than you have since you started smoking. Over the years  · After 1 year, your risk of heart disease is half what it would be if you kept smoking. · After 5 years, your risk of stroke starts to shrink.  Within a few years after that, it's about the same as if you'd never smoked. · After 10 years, your risk of dying from lung cancer is cut by about half. And your risk for many other types of cancer is lower too. How would quitting help others in your life? When you quit smoking, you improve the health of everyone who now breathes in your smoke. · Their heart, lung, and cancer risks drop, much like yours. · They are sick less. For babies and small children, living smoke-free means they're less likely to have ear infections, pneumonia, and bronchitis. · If you're a woman who is or will be pregnant someday, quitting smoking means a healthier . · Children who are close to you are less likely to become adult smokers. Where can you learn more? Go to http://www.gray.com/. Enter 052 [id number] [id number] in the search box to learn more about \"Learning About Benefits From Quitting Smoking. \"  Current as of: 2018  Content Version: 11.9  © 8982-4216 [employer]. Care instructions adapted under license by [employer] (which disclaims liability or warranty for this information). If you have questions about a medical condition or this instruction, always ask your healthcare professional. [place] [address] any warranty or liability for your use of this information. Stopping Smoking: Care Instructions  Your Care Instructions  Cigarette smokers crave the nicotine in cigarettes. Giving it up is much harder than simply changing a habit. Your body has to stop craving the nicotine. It is hard to quit, but you can do it. There are many tools that people use to quit smoking. You may find that combining tools works best for you. There are several steps to quitting. First you get ready to quit. Then you get support to help you. After that, you learn new skills and behaviors to become a nonsmoker. For many people, a necessary step is getting and using medicine.   Your doctor will help you set up the plan that best meets your needs. You may want to attend a smoking cessation program to help you quit smoking. When you choose a program, look for one that has proven success. Ask your doctor for ideas. You will greatly increase your chances of success if you take medicine as well as get counseling or join a cessation program.  Some of the changes you feel when you first quit tobacco are uncomfortable. Your body will miss the nicotine at first, and you may feel short-tempered and grumpy. You may have trouble sleeping or concentrating. Medicine can help you deal with these symptoms. You may struggle with changing your smoking habits and rituals. The last step is the tricky one: Be prepared for the smoking urge to continue for a time. This is a lot to deal with, but keep at it. You will feel better. Follow-up care is a key part of your treatment and safety. Be sure to make and go to all appointments, and call your doctor if you are having problems. It's also a good idea to know your test results and keep a list of the medicines you take. How can you care for yourself at home? · Ask your family, friends, and coworkers for support. You have a better chance of quitting if you have help and support. · Join a support group, such as Nicotine Anonymous, for people who are trying to quit smoking. · Consider signing up for a smoking cessation program, such as the American Lung Association's Freedom from Smoking program.  · Get text messaging support. Go to the website at www.smokefree. gov to sign up for the [facility] program.  · Set a quit date. Pick your date carefully so that it is not right in the middle of a big deadline or stressful time. Once you quit, do not even take a puff. Get rid of all ashtrays and lighters after your last cigarette. Clean your house and your clothes so that they do not smell of smoke. · Learn how to be a nonsmoker. Think about ways you can avoid those things that make you reach for a cigarette. ?  Avoid situations that put you at greatest risk for smoking. For some people, it is hard to have a drink with friends without smoking. For others, they might skip a coffee break with coworkers who smoke. ? Change your daily routine. Take a different route to work or eat a meal in a different place. · Cut down on stress. Calm yourself or release tension by doing an activity you enjoy, such as reading a book, taking a hot bath, or gardening. · Talk to your doctor or pharmacist about nicotine replacement therapy, which replaces the nicotine in your body. You still get nicotine but you do not use tobacco. Nicotine replacement products help you slowly reduce the amount of nicotine you need. These products come in several forms, many of them available over-the-counter:  ? Nicotine patches  ? Nicotine gum and lozenges  ? Nicotine inhaler  · Ask your doctor about bupropion (Wellbutrin) or varenicline (Chantix), which are prescription medicines. They do not contain nicotine. They help you by reducing withdrawal symptoms, such as stress and anxiety. · Some people find hypnosis, acupuncture, and massage helpful for ending the smoking habit. · Eat a healthy diet and get regular exercise. Having healthy habits will help your body move past its craving for nicotine. · Be prepared to keep trying. Most people are not successful the first few times they try to quit. Do not get mad at yourself if you smoke again. Make a list of things you learned and think about when you want to try again, such as next week, next month, or next year. Where can you learn more? Go to http://www.gray.com/. Enter S849 in the search box to learn more about \"Stopping Smoking: Care Instructions. \"  Current as of: September 26, 2018  Content Version: 11.9  © 0138-0247 [employer], [employer]. Care instructions adapted under license by [employer] (which disclaims liability or warranty for this information).  If you have questions about a medical condition or this instruction, always ask your healthcare professional. [address] any warranty or liability for your use of this information. Deciding About Using Medicines To Quit Smoking  How can you decide about using medicines to quit smoking? What are the medicines you can use? Your doctor may prescribe varenicline (Chantix) or bupropion (Zyban). These medicines can help you cope with cravings for tobacco. They are pills that don't contain nicotine. You also can use nicotine replacement products. These do contain nicotine. There are many types. · Gum and lozenges slowly release nicotine into your mouth. · Patches stick to your skin. They slowly release nicotine into your bloodstream.  · An inhaler has a tripp that contains nicotine. You breathe in a puff of nicotine vapor through your mouth and throat. · Nasal spray releases a mist that contains nicotine. What are key points about this decision? · Using medicines can double your chances of quitting smoking. They can ease cravings and withdrawal symptoms. · Getting counseling along with using medicine can raise your chances of quitting even more. · If you smoke fewer than 5 cigarettes a day, you may not need medicines to help you quit smoking. · These medicines have less nicotine than cigarettes. And by itself, nicotine is not nearly as harmful as smoking. The tars, carbon monoxide, and other toxic chemicals in tobacco cause the harmful effects. · The side effects of nicotine replacement products depend on the type of product. For example, a patch can make your skin red and itchy. Medicines in pill form can make you sick to your stomach. They can also cause dry mouth and trouble sleeping. For most people, the side effects are not bad enough to make them stop using the products. Why might you choose to use medicines to quit smoking?   · You have tried on your own to stop smoking, but you were not able to stop. · You smoke more than 5 cigarettes a day. · You want to increase your chances of quitting smoking. · You want to reduce your cravings and withdrawal symptoms. · You feel the benefits of medicine outweigh the side effects. Why might you choose not to use medicine? · You want to try quitting on your own by stopping all at once (\"cold turkey\"). · You want to cut back slowly on the number of cigarettes you smoke. · You smoke fewer than 5 cigarettes a day. · You do not like using medicine. · You feel the side effects of medicines outweigh the benefits. · You are worried about the cost of medicines. Your decision  Thinking about the facts and your feelings can help you make a decision that is right for you. Be sure you understand the benefits and risks of your options, and think about what else you need to do before you make the decision. Where can you learn more? Go to http://www.gray.com/. Enter A798 in the search box to learn more about \"Deciding About Using Medicines To Quit Smoking. \"  Current as of: September 26, 2018  Content Version: 11.9  © 4017-0602 [employer]. Care instructions adapted under license by [employer] (which disclaims liability or warranty for this information). If you have questions about a medical condition or this instruction, always ask your healthcare professional. [address] any warranty or liability for your use of this information.                Varenicline (Chantix, Chantix Starting [employer]) - (By mouth)   Why this medicine is used:   Helps you quit smoking, as part of a support program.  Contact a nurse or doctor right away if you have:  · Blistering, peeling, red skin rash  · Chest pain, fast, pounding, or uneven heartbeat  · Thoughts of hurting yourself or others, unusual moods or behaviors  · Seizures  · Feeling anxious, depressed, restless, or irritable, seeing or hearing things that are not really there  · Numbness or weakness on one side of your body, sudden or severe headache, problems with vision, speech, or walking     Common side effects:  · Headache, trouble sleeping, unusual dreams  · Nausea, constipation, gas  © 2017 [facility] Information is for End User's use only and may not be sold, redistributed or otherwise used for commercial purposes.

## 2021-09-20 DIAGNOSIS — J30.89 NON-SEASONAL ALLERGIC RHINITIS, UNSPECIFIED TRIGGER: ICD-10-CM

## 2021-09-21 RX ORDER — FLUTICASONE PROPIONATE 50 MCG
SPRAY, SUSPENSION (ML) NASAL
Qty: 16 G | Refills: 11 | Status: SHIPPED | OUTPATIENT
Start: 2021-09-21 | End: 2022-10-10

## 2021-09-24 ENCOUNTER — OFFICE VISIT (OUTPATIENT)
Dept: ORTHOPEDIC SURGERY | Age: 56
End: 2021-09-24
Payer: MEDICAID

## 2021-09-24 VITALS
TEMPERATURE: 96.8 F | HEIGHT: 64 IN | WEIGHT: 238.2 LBS | RESPIRATION RATE: 16 BRPM | OXYGEN SATURATION: 99 % | BODY MASS INDEX: 40.67 KG/M2 | HEART RATE: 85 BPM

## 2021-09-24 DIAGNOSIS — G89.29 CHRONIC PAIN OF LEFT KNEE: ICD-10-CM

## 2021-09-24 DIAGNOSIS — M17.11 PRIMARY OSTEOARTHRITIS OF RIGHT KNEE: ICD-10-CM

## 2021-09-24 DIAGNOSIS — G89.29 CHRONIC RIGHT SHOULDER PAIN: ICD-10-CM

## 2021-09-24 DIAGNOSIS — D16.9 ENCHONDROMA OF BONE: Primary | ICD-10-CM

## 2021-09-24 DIAGNOSIS — Z98.890 STATUS POST ARTHROSCOPY OF RIGHT SHOULDER: ICD-10-CM

## 2021-09-24 DIAGNOSIS — G89.29 CHRONIC PAIN OF RIGHT KNEE: ICD-10-CM

## 2021-09-24 DIAGNOSIS — E11.49 OTHER DIABETIC NEUROLOGICAL COMPLICATION ASSOCIATED WITH TYPE 2 DIABETES MELLITUS (HCC): ICD-10-CM

## 2021-09-24 DIAGNOSIS — M25.511 CHRONIC RIGHT SHOULDER PAIN: ICD-10-CM

## 2021-09-24 DIAGNOSIS — M25.562 CHRONIC PAIN OF LEFT KNEE: ICD-10-CM

## 2021-09-24 DIAGNOSIS — M25.561 CHRONIC PAIN OF RIGHT KNEE: ICD-10-CM

## 2021-09-24 DIAGNOSIS — M17.12 PRIMARY OSTEOARTHRITIS OF LEFT KNEE: ICD-10-CM

## 2021-09-24 PROCEDURE — 20610 DRAIN/INJ JOINT/BURSA W/O US: CPT | Performed by: SPECIALIST

## 2021-09-24 PROCEDURE — 99213 OFFICE O/P EST LOW 20 MIN: CPT | Performed by: SPECIALIST

## 2021-09-24 RX ORDER — BETAMETHASONE SODIUM PHOSPHATE AND BETAMETHASONE ACETATE 3; 3 MG/ML; MG/ML
6 INJECTION, SUSPENSION INTRA-ARTICULAR; INTRALESIONAL; INTRAMUSCULAR; SOFT TISSUE ONCE
Status: COMPLETED | OUTPATIENT
Start: 2021-09-24 | End: 2021-09-24

## 2021-09-24 RX ADMIN — BETAMETHASONE SODIUM PHOSPHATE AND BETAMETHASONE ACETATE 6 MG: 3; 3 INJECTION, SUSPENSION INTRA-ARTICULAR; INTRALESIONAL; INTRAMUSCULAR; SOFT TISSUE at 09:34
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
## 2021-09-25 DIAGNOSIS — J45.909 PERSISTENT ASTHMA WITHOUT COMPLICATION, UNSPECIFIED ASTHMA SEVERITY: ICD-10-CM

## 2021-09-27 RX ORDER — MONTELUKAST SODIUM 10 MG/1
TABLET ORAL
Qty: 90 TABLET | Refills: 1 | Status: SHIPPED | OUTPATIENT
Start: 2021-09-27 | End: 2022-02-13

## 2021-10-05 ENCOUNTER — TELEPHONE (OUTPATIENT)
Dept: FAMILY MEDICINE CLINIC | Age: 56
End: 2021-10-05

## 2021-10-05 DIAGNOSIS — G47.00 INSOMNIA, UNSPECIFIED TYPE: ICD-10-CM

## 2021-10-05 DIAGNOSIS — F32.A ANXIETY AND DEPRESSION: Primary | ICD-10-CM

## 2021-10-05 DIAGNOSIS — E78.5 HYPERLIPIDEMIA, UNSPECIFIED HYPERLIPIDEMIA TYPE: ICD-10-CM

## 2021-10-05 DIAGNOSIS — F41.9 ANXIETY AND DEPRESSION: Primary | ICD-10-CM

## 2021-10-05 NOTE — TELEPHONE ENCOUNTER
Patient was called back regarding her medication. Patient stated that bupropion was discontinued. Patient requested any antidepressant medication due to family situation. Please advise.

## 2021-10-05 NOTE — TELEPHONE ENCOUNTER
Pt called to let Dr. Kumar Patten know that he doesn't have his depression/anxiety medication, and he wants to know can Dr. Kumar Patten put him back on it. Please advise.  Thank you!!!

## 2021-10-06 RX ORDER — BUPROPION HYDROCHLORIDE 150 MG/1
150 TABLET ORAL DAILY
Qty: 90 TABLET | Refills: 0 | Status: SHIPPED | OUTPATIENT
Start: 2021-10-06 | End: 2021-12-20

## 2021-10-07 RX ORDER — TRAZODONE HYDROCHLORIDE 150 MG/1
TABLET ORAL
Qty: 90 TABLET | Refills: 0 | Status: SHIPPED | OUTPATIENT
Start: 2021-10-07 | End: 2022-01-30

## 2021-10-07 RX ORDER — PRAVASTATIN SODIUM 40 MG/1
TABLET ORAL
Qty: 90 TABLET | Refills: 0 | Status: SHIPPED | OUTPATIENT
Start: 2021-10-07 | End: 2022-02-13

## 2021-12-06 ENCOUNTER — HOSPITAL ENCOUNTER (OUTPATIENT)
Dept: WOMENS IMAGING | Age: 56
Discharge: HOME | End: 2021-12-06
Attending: NURSE PRACTITIONER
Payer: MEDICAID

## 2021-12-06 DIAGNOSIS — Z12.31 VISIT FOR SCREENING MAMMOGRAM: ICD-10-CM

## 2021-12-06 PROCEDURE — 77063 BREAST TOMOSYNTHESIS BI: CPT

## 2021-12-20 DIAGNOSIS — F32.A ANXIETY AND DEPRESSION: ICD-10-CM

## 2021-12-20 DIAGNOSIS — F41.9 ANXIETY AND DEPRESSION: ICD-10-CM

## 2021-12-20 RX ORDER — BUPROPION HYDROCHLORIDE 150 MG/1
TABLET ORAL
Qty: 90 TABLET | Refills: 0 | Status: SHIPPED | OUTPATIENT
Start: 2021-12-20 | End: 2022-02-13

## 2021-12-23 ENCOUNTER — LAB ONLY (OUTPATIENT)
Dept: FAMILY MEDICINE CLINIC | Age: 56
End: 2021-12-23

## 2021-12-23 DIAGNOSIS — I10 ESSENTIAL HYPERTENSION WITH GOAL BLOOD PRESSURE LESS THAN 130/80: ICD-10-CM

## 2021-12-23 DIAGNOSIS — E78.2 MIXED HYPERLIPIDEMIA: ICD-10-CM

## 2021-12-23 DIAGNOSIS — E11.9 TYPE 2 DIABETES MELLITUS WITHOUT COMPLICATION, WITHOUT LONG-TERM CURRENT USE OF INSULIN (HCC): ICD-10-CM

## 2021-12-24 LAB
A-G RATIO,AGRAT: 1.5 RATIO (ref 1.1–2.6)
ALBUMIN SERPL-MCNC: 4 G/DL (ref 3.5–5)
ALP SERPL-CCNC: 96 U/L (ref 25–115)
ALT SERPL-CCNC: 11 U/L (ref 5–40)
ANION GAP SERPL CALC-SCNC: 11 MMOL/L (ref 3–15)
AST SERPL W P-5'-P-CCNC: 12 U/L (ref 10–37)
AVG GLU, 10930: 153 MG/DL (ref 91–123)
BILIRUB SERPL-MCNC: 0.1 MG/DL (ref 0.2–1.2)
BUN SERPL-MCNC: 14 MG/DL (ref 6–22)
CALCIUM SERPL-MCNC: 9.5 MG/DL (ref 8.4–10.5)
CHLORIDE SERPL-SCNC: 101 MMOL/L (ref 98–110)
CHOLEST SERPL-MCNC: 140 MG/DL (ref 110–200)
CO2 SERPL-SCNC: 28 MMOL/L (ref 20–32)
CREAT SERPL-MCNC: 0.9 MG/DL (ref 0.5–1.2)
GFRAA, 66117: >60
GFRNA, 66118: >60
GLOBULIN,GLOB: 2.6 G/DL (ref 2–4)
GLUCOSE SERPL-MCNC: 106 MG/DL (ref 70–99)
HBA1C MFR BLD HPLC: 7 % (ref 4.8–5.6)
HDLC SERPL-MCNC: 3.6 MG/DL (ref 0–5)
HDLC SERPL-MCNC: 39 MG/DL
LDL/HDL RATIO,LDHD: 2
LDLC SERPL CALC-MCNC: 78 MG/DL (ref 50–99)
NON-HDL CHOLESTEROL, 011976: 101 MG/DL
POTASSIUM SERPL-SCNC: 4.3 MMOL/L (ref 3.5–5.5)
PROT SERPL-MCNC: 6.6 G/DL (ref 6.4–8.3)
SODIUM SERPL-SCNC: 140 MMOL/L (ref 133–145)
TRIGL SERPL-MCNC: 119 MG/DL (ref 40–149)
VLDLC SERPL CALC-MCNC: 24 MG/DL (ref 8–30)

## 2022-01-29 DIAGNOSIS — G47.00 INSOMNIA, UNSPECIFIED TYPE: ICD-10-CM

## 2022-01-30 RX ORDER — TRAZODONE HYDROCHLORIDE 150 MG/1
TABLET ORAL
Qty: 90 TABLET | Refills: 0 | Status: SHIPPED | OUTPATIENT
Start: 2022-01-30 | End: 2022-02-27

## 2022-01-31 DIAGNOSIS — J45.909 PERSISTENT ASTHMA WITHOUT COMPLICATION, UNSPECIFIED ASTHMA SEVERITY: ICD-10-CM

## 2022-02-01 ENCOUNTER — VIRTUAL VISIT (OUTPATIENT)
Dept: FAMILY MEDICINE CLINIC | Age: 57
End: 2022-02-01

## 2022-02-01 DIAGNOSIS — Z91.199 NO-SHOW FOR APPOINTMENT: Primary | ICD-10-CM

## 2022-02-01 RX ORDER — BUDESONIDE 90 UG/1
AEROSOL, POWDER RESPIRATORY (INHALATION)
Qty: 1 EACH | Refills: 5 | Status: SHIPPED | OUTPATIENT
Start: 2022-02-01 | End: 2022-04-26

## 2022-02-01 NOTE — PROGRESS NOTES
11:15  Called [phone] (Mobile)- \"not accepting calls at this time\"    Called [phone] (Work Phone)- [facility] customer you are trying to call is not available please try to call back later\"

## 2022-02-13 DIAGNOSIS — E78.5 HYPERLIPIDEMIA, UNSPECIFIED HYPERLIPIDEMIA TYPE: ICD-10-CM

## 2022-02-13 DIAGNOSIS — J45.909 PERSISTENT ASTHMA WITHOUT COMPLICATION, UNSPECIFIED ASTHMA SEVERITY: ICD-10-CM

## 2022-02-13 DIAGNOSIS — F32.A ANXIETY AND DEPRESSION: ICD-10-CM

## 2022-02-13 DIAGNOSIS — I10 ESSENTIAL HYPERTENSION WITH GOAL BLOOD PRESSURE LESS THAN 130/80: ICD-10-CM

## 2022-02-13 DIAGNOSIS — F41.9 ANXIETY AND DEPRESSION: ICD-10-CM

## 2022-02-13 RX ORDER — LOSARTAN POTASSIUM 25 MG/1
TABLET ORAL
Qty: 90 TABLET | Refills: 1 | Status: SHIPPED | OUTPATIENT
Start: 2022-02-13 | End: 2022-06-12

## 2022-02-13 RX ORDER — HYDROCHLOROTHIAZIDE 25 MG/1
TABLET ORAL
Qty: 90 TABLET | Refills: 1 | Status: SHIPPED | OUTPATIENT
Start: 2022-02-13 | End: 2022-06-12

## 2022-02-13 RX ORDER — MONTELUKAST SODIUM 10 MG/1
TABLET ORAL
Qty: 90 TABLET | Refills: 1 | Status: SHIPPED | OUTPATIENT
Start: 2022-02-13 | End: 2022-04-26 | Stop reason: SDUPTHER

## 2022-02-13 RX ORDER — PRAVASTATIN SODIUM 40 MG/1
TABLET ORAL
Qty: 90 TABLET | Refills: 0 | Status: SHIPPED | OUTPATIENT
Start: 2022-02-13 | End: 2022-02-27

## 2022-02-13 RX ORDER — BUPROPION HYDROCHLORIDE 150 MG/1
TABLET ORAL
Qty: 90 TABLET | Refills: 0 | Status: SHIPPED | OUTPATIENT
Start: 2022-02-13 | End: 2022-05-29

## 2022-02-26 DIAGNOSIS — E78.5 HYPERLIPIDEMIA, UNSPECIFIED HYPERLIPIDEMIA TYPE: ICD-10-CM

## 2022-02-26 DIAGNOSIS — G47.00 INSOMNIA, UNSPECIFIED TYPE: ICD-10-CM

## 2022-02-27 RX ORDER — TRAZODONE HYDROCHLORIDE 150 MG/1
TABLET ORAL
Qty: 90 TABLET | Refills: 0 | Status: SHIPPED | OUTPATIENT
Start: 2022-02-27 | End: 2022-04-10

## 2022-02-27 RX ORDER — PRAVASTATIN SODIUM 40 MG/1
TABLET ORAL
Qty: 90 TABLET | Refills: 0 | Status: SHIPPED | OUTPATIENT
Start: 2022-02-27 | End: 2022-05-29

## 2022-03-18 PROBLEM — M25.511 CHRONIC RIGHT SHOULDER PAIN: Status: ACTIVE | Noted: 2021-03-16

## 2022-03-18 PROBLEM — E78.5 HYPERLIPIDEMIA: Status: ACTIVE | Noted: 2020-02-27

## 2022-03-18 PROBLEM — G89.29 CHRONIC RIGHT SHOULDER PAIN: Status: ACTIVE | Noted: 2021-03-16

## 2022-03-19 PROBLEM — E66.01 SEVERE OBESITY (HCC): Status: ACTIVE | Noted: 2019-11-18

## 2022-03-19 PROBLEM — F17.200 NICOTINE DEPENDENCE, UNCOMPLICATED: Status: ACTIVE | Noted: 2021-08-31

## 2022-03-19 PROBLEM — E11.9 TYPE 2 DIABETES MELLITUS WITHOUT COMPLICATION, WITHOUT LONG-TERM CURRENT USE OF INSULIN (HCC): Status: ACTIVE | Noted: 2020-02-27

## 2022-03-19 PROBLEM — J45.20 MILD INTERMITTENT ASTHMA: Status: ACTIVE | Noted: 2019-10-02

## 2022-03-19 PROBLEM — J30.89 NON-SEASONAL ALLERGIC RHINITIS: Status: ACTIVE | Noted: 2020-02-27

## 2022-03-20 PROBLEM — I10 ESSENTIAL HYPERTENSION WITH GOAL BLOOD PRESSURE LESS THAN 130/80: Status: ACTIVE | Noted: 2020-02-27

## 2022-04-05 ENCOUNTER — TELEPHONE (OUTPATIENT)
Dept: ORTHOPEDIC SURGERY | Age: 57
End: 2022-04-05

## 2022-04-05 NOTE — TELEPHONE ENCOUNTER
Patient is requesting for her pain management order to be sent to her PCP Bekah Brush. Patient does not have the fax #.      PCP phone # [phone]    Patient can be reached at [phone]

## 2022-04-10 DIAGNOSIS — G47.00 INSOMNIA, UNSPECIFIED TYPE: ICD-10-CM

## 2022-04-10 RX ORDER — TRAZODONE HYDROCHLORIDE 150 MG/1
TABLET ORAL
Qty: 90 TABLET | Refills: 0 | Status: SHIPPED | OUTPATIENT
Start: 2022-04-10 | End: 2022-08-04

## 2022-04-26 ENCOUNTER — OFFICE VISIT (OUTPATIENT)
Dept: FAMILY MEDICINE CLINIC | Age: 57
End: 2022-04-26
Payer: MEDICAID

## 2022-04-26 VITALS
RESPIRATION RATE: 22 BRPM | SYSTOLIC BLOOD PRESSURE: 104 MMHG | BODY MASS INDEX: 40.89 KG/M2 | HEIGHT: 64 IN | TEMPERATURE: 97.5 F | HEART RATE: 101 BPM | DIASTOLIC BLOOD PRESSURE: 80 MMHG | OXYGEN SATURATION: 93 %

## 2022-04-26 DIAGNOSIS — J45.20 MILD INTERMITTENT ASTHMA, UNSPECIFIED WHETHER COMPLICATED: ICD-10-CM

## 2022-04-26 DIAGNOSIS — I10 ESSENTIAL HYPERTENSION WITH GOAL BLOOD PRESSURE LESS THAN 130/80: ICD-10-CM

## 2022-04-26 DIAGNOSIS — G57.12 MERALGIA PARESTHETICA OF LEFT SIDE: ICD-10-CM

## 2022-04-26 DIAGNOSIS — R09.82 PND (POST-NASAL DRIP): ICD-10-CM

## 2022-04-26 DIAGNOSIS — M17.0 PRIMARY OSTEOARTHRITIS OF BOTH KNEES: ICD-10-CM

## 2022-04-26 DIAGNOSIS — E78.2 MIXED HYPERLIPIDEMIA: ICD-10-CM

## 2022-04-26 DIAGNOSIS — E11.9 TYPE 2 DIABETES MELLITUS WITHOUT COMPLICATION, WITHOUT LONG-TERM CURRENT USE OF INSULIN (HCC): Primary | ICD-10-CM

## 2022-04-26 PROCEDURE — 99214 OFFICE O/P EST MOD 30 MIN: CPT | Performed by: NURSE PRACTITIONER

## 2022-04-26 PROCEDURE — 3044F HG A1C LEVEL LT 7.0%: CPT | Performed by: NURSE PRACTITIONER

## 2022-04-26 RX ORDER — MONTELUKAST SODIUM 10 MG/1
10 TABLET ORAL DAILY
Qty: 90 TABLET | Refills: 1 | Status: SHIPPED | OUTPATIENT
Start: 2022-04-26

## 2022-04-26 RX ORDER — AZELASTINE 1 MG/ML
1 SPRAY, METERED NASAL 2 TIMES DAILY
Qty: 1 EACH | Refills: 0 | Status: SHIPPED | OUTPATIENT
Start: 2022-04-26 | End: 2022-05-29

## 2022-04-26 RX ORDER — FLUTICASONE PROPIONATE AND SALMETEROL XINAFOATE 45; 21 UG/1; UG/1
2 AEROSOL, METERED RESPIRATORY (INHALATION) 2 TIMES DAILY
Qty: 12 G | Refills: 5 | Status: SHIPPED | OUTPATIENT
Start: 2022-04-26

## 2022-04-26 NOTE — PROGRESS NOTES
[address] [place] [address]               [phone]      Francesco Lovett is a 49 y.o. female and presents with Follow Up Chronic Condition, Hypertension, and Diabetes       Assessment/Plan:    Diagnoses and all orders for this visit:    1. Type 2 diabetes mellitus without complication, without long-term current use of insulin (HCC)  -     HEMOGLOBIN A1C WITH EAG; Future  -     MICROALBUMIN, UR, RAND W/ MICROALB/CREAT RATIO; Future   Endorses medication compliance, Follow up labs prior to next visit, Denies signs and symptoms of hyper or hypoglycemia and Diabetes controlled, A1C <7   2. Mixed hyperlipidemia  -     LIPID PANEL; Future  Endorses medication compliance, Follow-up labs today and Denies abdominal pain or symptoms of myalgia   3. Essential hypertension with goal blood pressure less than 563/57  -     METABOLIC PANEL, COMPREHENSIVE; Future  Endorses medication compliance, Follow-up labs today and Blood pressure stable, continue losartan, hctz at same dose   4. Mild intermittent asthma, unspecified whether complicated  -     montelukast (SINGULAIR) 10 mg tablet; Take 1 Tablet by mouth daily. -     fluticasone-Salmeterol (Advair HFA) 45-21 mcg/actuation inhaler; Take 2 Puffs by inhalation two (2) times a day. -     AMB SUPPLY ORDER  Refills provided  Order for new nebulizer placed  5. PND (post-nasal drip)  -     azelastine (ASTELIN) 137 mcg (0.1 %) nasal spray; 1 [place] by Both Nostrils route two (2) times a day. Use in each nostril as directed    6. Primary osteoarthritis of both knees  -     REFERRAL TO ORTHOPEDICS        Follow-up and Dispositions    · Return in about 4 months (around 8/26/2022) for MAWV-welcome, DM, HLD, HTN, 30 min, office only.            Health Maintenance:   Health Maintenance   Topic Date Due    Pneumococcal 0-64 years (1 - PCV) Never done    Cervical cancer screen  06/22/2020    DTaP/Tdap/Td series (2 - Td or Tdap) 11/19/2020    Foot Exam Q1  08/24/2021    Depression Monitoring  03/16/2022    MICROALBUMIN Q1  03/16/2022    Shingrix Vaccine Age 50> (2 of 2) 03/18/2022    Eye Exam Retinal or Dilated  07/26/2022 (Originally 11/14/1975)    Flu Vaccine (Season Ended) 09/01/2022    A1C test (Diabetic or Prediabetic)  12/23/2022    Lipid Screen  12/23/2022    Breast Cancer Screen Mammogram  12/06/2023    Colorectal Cancer Screening Combo  06/08/2026    Hepatitis C Screening  Completed    COVID-19 Vaccine  Completed        Subjective:    Labs obtained prior to visit? NO  Reviewed with patient? Not applicable    Left arm numbness  Only happens at night  From shoulder to middle, ring and pinky finger  She sleeps on that side  Recommend wearing a brace at night    Meralgia paresthetica  To the left thigh  intermittent        DMII-   Patient reports medication compliance Daily  Diabetic diet compliance most of the time  Patient monitors blood sugars regularly does not have a glucometer   Reports am fasting sugars range does not check   Denies hypoglycemic episodes yes  Denies polyuria, polydipsia, paraesthesia, vision changes? yes  Engaging in daily exercise? No     Diabetic Foot and Eye Exam HM Status   Topic Date Due    Diabetic Foot Care  08/24/2021    Eye Exam  07/26/2022 (Originally 11/14/1975)     Hemoglobin A1c   Date Value Ref Range Status   12/23/2021 7.0 (H) 4.8 - 5.6 % Final     Hemoglobin A1c, External   Date Value Ref Range Status   01/08/2020 8.7  Final     Comment:     ce   ]  Creatinine, urine   Date Value Ref Range Status   03/16/2021 69 mg/dL Final     Microalb/Creat ratio (ug/mg creat.)   Date Value Ref Range Status   03/16/2021 24.6 0.0 - 30.0 Final     Key Antihyperglycemic Medications             metFORMIN ER (GLUCOPHAGE XR) 500 mg tablet (Taking) Take 1 Tablet by mouth daily (with dinner).         Hypertension:  Visit Vitals  /80 (BP 1 Location: Right arm, BP Patient Position: Sitting, BP Cuff Size: Adult) Comment (BP Patient Position): feet flat on floor   Pulse (!) 101   Temp 97.5 °F (36.4 °C) (Temporal)   Resp 22   Ht 5' 4\" (1.626 m)   SpO2 93%   BMI 40.89 kg/m²      Patient reports taking medications as instructed. yes   Medication side effects noted. no  Headache upon wakening. no   Home BP monitoring: Does not check  Do you experience chest pain/pressure or SOB with exertion? no  Maintain a low Sodium diet? yes  Key CAD CHF Meds             pravastatin (PRAVACHOL) 40 mg tablet (Taking) TAKE 1 TABLET BY MOUTH EVERY DAY AT NIGHT    losartan (COZAAR) 25 mg tablet (Taking) TAKE 1 TABLET BY MOUTH EVERY DAY    hydroCHLOROthiazide (HYDRODIURIL) 25 mg tablet (Taking) TAKE 1 TABLET BY MOUTH EVERY DAY        BUN   Date Value Ref Range Status   12/23/2021 14 6 - 22 mg/dL Final     Creatinine   Date Value Ref Range Status   12/23/2021 0.9 0.5 - 1.2 mg/dL Final     GFR est AA   Date Value Ref Range Status   07/09/2020 >60 >60 ml/min/1.73m2 Final     Potassium   Date Value Ref Range Status   12/23/2021 4.3 3.5 - 5.5 mmol/L Final       HLD:  Has been compliant with meds  Yes  Compliant with low-fat diet. most of the time    Denies myalgias or other side effects. yes  The 10-year ASCVD risk score (Keiry Delgado., et al., 2013) is: 10.4%    Cholesterol, total   Date Value Ref Range Status   12/23/2021 140 110 - 200 mg/dL Final     Triglyceride   Date Value Ref Range Status   12/23/2021 119 40 - 149 mg/dL Final     HDL Cholesterol   Date Value Ref Range Status   12/23/2021 39 (L) >=40 mg/dL Final     LDL, calculated   Date Value Ref Range Status   12/23/2021 78 50 - 99 mg/dL Final   ]  Key Antihyperlipidemia Meds             pravastatin (PRAVACHOL) 40 mg tablet (Taking) TAKE 1 TABLET BY MOUTH EVERY DAY AT NIGHT           ROS:     ROS  As stated in HPI, otherwise all others negative. The problem list was updated as a part of today's visit.   Patient Active Problem List   Diagnosis Code    Syphilis in female A53.9    Menopause Z78.0    Eczema L30.9    Anxiety and depression F41.9, F32. A    Mild intermittent asthma J45.20    Severe obesity ([facility]) E66.01    Essential hypertension with goal blood pressure less than 130/80 I10    Hyperlipidemia E78.5    Non-seasonal allergic rhinitis J30.89    Type 2 diabetes mellitus without complication, without long-term current use of insulin ([facility]) E11.9    Chronic right shoulder pain M25.511, G89.29    Nicotine dependence, uncomplicated W41.928       The PSH, FH were reviewed. SH:  Social History     Tobacco Use    Smoking status: Current Every Day Smoker     Packs/day: 0.50     Years: 35.00     Pack years: 17.50     Types: Cigarettes    Smokeless tobacco: Never Used    Tobacco comment: quit in january, smokes one occ   Vaping Use    Vaping Use: Never used   Substance Use Topics    Alcohol use: Yes     Alcohol/week: 2.0 standard drinks     Types: 2 Shots of liquor per week    Drug use: Yes     Types: Cocaine       Medications/Allergies:  Current Outpatient Medications on File Prior to Visit   Medication Sig Dispense Refill    traZODone (DESYREL) 150 mg tablet TAKE 1 TABLET BY MOUTH EVERY DAY AT NIGHT 90 Tablet 0    pravastatin (PRAVACHOL) 40 mg tablet TAKE 1 TABLET BY MOUTH EVERY DAY AT NIGHT 90 Tablet 0    losartan (COZAAR) 25 mg tablet TAKE 1 TABLET BY MOUTH EVERY DAY 90 Tablet 1    buPROPion XL (WELLBUTRIN XL) 150 mg tablet TAKE 1 TABLET BY MOUTH EVERY DAY 90 Tablet 0    hydroCHLOROthiazide (HYDRODIURIL) 25 mg tablet TAKE 1 TABLET BY MOUTH EVERY DAY 90 Tablet 1    fluticasone propionate (FLONASE) 50 mcg/actuation nasal spray SPRAY 2 SPRAYS IN EACH NOSTRIL ONCE DAILY 16 g 11    pantoprazole (PROTONIX) 40 mg tablet Take 40 mg by mouth two (2) times a day.  metFORMIN ER (GLUCOPHAGE XR) 500 mg tablet Take 1 Tablet by mouth daily (with dinner).  30 Tablet 11    diclofenac (VOLTAREN) 1 % gel APPLY 4 GRAMS TO AFFECTED AREA FOUR TIMES DAILY 100 g 11    albuterol (PROVENTIL HFA, VENTOLIN HFA, PROAIR HFA) 90 mcg/actuation inhaler Take 2 Puffs by inhalation every six (6) hours as needed for Wheezing. 3 Inhaler 2    ketoconazole (NIZORAL) 2 % topical cream Apply once daily to cover the affected and immediate surrounding area for 2 weeks 30 g 2    [DISCONTINUED] montelukast (SINGULAIR) 10 mg tablet TAKE 1 TABLET BY MOUTH EVERY DAY 90 Tablet 1    [DISCONTINUED] Pulmicort Flexhaler 90 mcg/actuation aepb inhaler INHALE 2 PUFFS BY MOUTH TWICE A DAY 1 Each 5     No current facility-administered medications on file prior to visit. Allergies   Allergen Reactions    Aspirin Nausea and Vomiting    Dog Dander Swelling    Ibuprofen Unknown (comments)     She doesn't know exactly why she is not allowed to take it    Lisinopril Cough    Mold Itching    Pollen Extracts Itching    Prednisone Swelling       Objective:  Visit Vitals  /80 (BP 1 Location: Right arm, BP Patient Position: Sitting, BP Cuff Size: Adult) Comment (BP Patient Position): feet flat on floor   Pulse (!) 101   Temp 97.5 °F (36.4 °C) (Temporal)   Resp 22   Ht 5' 4\" (1.626 m)   SpO2 93%   BMI 40.89 kg/m²    Body mass index is 40.89 kg/m². Physical assessment  Physical Exam  Vitals and nursing note reviewed. Eyes:      Conjunctiva/sclera: Conjunctivae normal.      Pupils: Pupils are equal, round, and reactive to light. Cardiovascular:      Rate and Rhythm: Normal rate and regular rhythm. Heart sounds: Normal heart sounds. No murmur heard. No friction rub. No gallop. Pulmonary:      Effort: Pulmonary effort is normal.      Breath sounds: Normal breath sounds. Musculoskeletal:         General: Normal range of motion. Cervical back: Normal range of motion. Comments: Ambulates with walker   Skin:     General: Skin is warm and dry. Neurological:      Mental Status: She is alert.            Labwork and Ancillary Studies:    CBC w/Diff  Lab Results   Component Value Date/Time    WBC 10.7 07/09/2020 08:49 AM HGB 12.6 07/09/2020 08:49 AM    PLATELET 976 79/36/2230 08:49 AM         Basic Metabolic Profile  Lab Results   Component Value Date/Time    Sodium 140 12/23/2021 10:21 AM    Potassium 4.3 12/23/2021 10:21 AM    Chloride 101 12/23/2021 10:21 AM    CO2 28 12/23/2021 10:21 AM    Anion gap 11.0 12/23/2021 10:21 AM    Glucose 106 (H) 12/23/2021 10:21 AM    BUN 14 12/23/2021 10:21 AM    Creatinine 0.9 12/23/2021 10:21 AM    BUN/Creatinine ratio 14 07/09/2020 08:49 AM    GFR est AA >60 07/09/2020 08:49 AM    GFR est non-AA 59 (L) 07/09/2020 08:49 AM    Calcium 9.5 12/23/2021 10:21 AM        Cholesterol  Lab Results   Component Value Date/Time    Cholesterol, total 140 12/23/2021 10:21 AM    HDL Cholesterol 39 (L) 12/23/2021 10:21 AM    LDL, calculated 78 12/23/2021 10:21 AM    Triglyceride 119 12/23/2021 10:21 AM    CHOL/HDL Ratio 5.4 (H) 11/05/2019 08:44 AM           I have discussed the diagnosis with the patient and the intended plan as seen in the above orders. The patient has received an After-Visit Summary and questions were answered concerning future plans. An After Visit Summary was printed and given to the patient. All diagnosis have been discussed with the patient and all of the patient's questions have been answered. Follow-up and Dispositions    · Return in about 4 months (around 8/26/2022) for MAWV-welcome, DM, HLD, HTN, 30 min, office only. [address], [facility]  [address] [facility]   [address] [facility] [address]  [address]

## 2022-04-26 NOTE — PROGRESS NOTES
Room 8    Patient states I need a new nebulizer for at home. Patient states I am having shortness of breathe  . Patient states I would like to discuss my left side gets numb. Did patient bring someone? No    Did the patient have DME equipment? Yes Verna Maxwell     Did you take your medication today? Yes       1. \"Have you been to the ER, urgent care clinic since your last visit? Hospitalized since your last visit? \" No    2. \"Have you seen or consulted any other health care providers outside of the [address] [place] [address] since your last visit? \" No     3. For patients aged 39-70: Has the patient had a colonoscopy / FIT/ Cologuard? Yes - no Care Gap present      If the patient is female:    4. For patients aged 41-77: Has the patient had a mammogram within the past 2 years? Yes - no Care Gap present      5. For patients aged 21-65: Has the patient had a pap smear?  No Patient states I will schedule an appointment for pap        3 most recent PHQ Screens 4/26/2022   Little interest or pleasure in doing things Several days   Feeling down, depressed, irritable, or hopeless Several days   Total Score PHQ 2 2   Trouble falling or staying asleep, or sleeping too much -   Feeling tired or having little energy -   Poor appetite, weight loss, or overeating -   Feeling bad about yourself - or that you are a failure or have let yourself or your family down -   Trouble concentrating on things such as school, work, reading, or watching TV -   Moving or speaking so slowly that other people could have noticed; or the opposite being so fidgety that others notice -   Thoughts of being better off dead, or hurting yourself in some way -   PHQ 9 Score -         Health Maintenance Due   Topic Date Due    Pneumococcal 0-64 years (1 - PCV) Never done    Eye Exam Retinal or Dilated  Never done    Cervical cancer screen  06/22/2020    DTaP/Tdap/Td series (2 - Td or Tdap) 11/19/2020    Foot Exam Q1  08/24/2021    Depression Monitoring 03/16/2022    MICROALBUMIN Q1  03/16/2022    Shingrix Vaccine Age 50> (2 of 2) 03/18/2022       No flowsheet data found.

## 2022-04-26 NOTE — PATIENT INSTRUCTIONS
Try using a stiff brace for carpal tunnel to see if this helps your hand numbness at night. Meralgia Paresthetica: Care Instructions  Your Care Instructions  Meralgia paresthetica (say \"muh-RAL-juh ohk-juk-DCZL-ick-uh\") is pain and numbness in the outer part of your thigh. The pain might get worse after you walk or stand for a long time. This pain and numbness occur when a nerve in your thigh is pinched (compressed). Sometimes the problem is caused by wearing tight clothing or being overweight. Most of the time the problem goes away on its own in a few months. Lowering any pressure on the thigh area may help. Wear loose clothes, and lose weight if you need to. Follow-up care is a key part of your treatment and safety. Be sure to make and go to all appointments, and call your doctor if you are having problems. It's also a good idea to know your test results and keep a list of the medicines you take. How can you care for yourself at home? · Most times the problem gets better on its own. Try wearing loose clothing to see if this helps. · Lose weight if you need to. Talk with your doctor if you need help. When should you call for help? Watch closely for changes in your health, and be sure to contact your doctor if:    · You have new symptoms, such as pain that gets worse or new numbness in your thigh.     · You do not get better as expected. Where can you learn more? Go to http://www.gray.com/  Enter C468 in the search box to learn more about \"Meralgia Paresthetica: Care Instructions. \"  Current as of: December 13, 2021               Content Version: 13.2  © 9780-1372 [employer] Care instructions adapted under license by [employer] (which disclaims liability or warranty for this information).  If you have questions about a medical condition or this instruction, always ask your healthcare professional. Janie Hobbs disclaims any warranty or liability for your use of this information.

## 2022-04-27 LAB
A-G RATIO,AGRAT: 1.6 RATIO (ref 1.1–2.6)
ALBUMIN SERPL-MCNC: 4.5 G/DL (ref 3.5–5)
ALP SERPL-CCNC: 102 U/L (ref 25–115)
ALT SERPL-CCNC: 11 U/L (ref 5–40)
ANION GAP SERPL CALC-SCNC: 13 MMOL/L (ref 3–15)
AST SERPL W P-5'-P-CCNC: 14 U/L (ref 10–37)
AVG GLU, 10930: 144 MG/DL (ref 91–123)
BILIRUB SERPL-MCNC: 0.2 MG/DL (ref 0.2–1.2)
BUN SERPL-MCNC: 13 MG/DL (ref 6–22)
CALCIUM SERPL-MCNC: 9.7 MG/DL (ref 8.4–10.5)
CHLORIDE SERPL-SCNC: 100 MMOL/L (ref 98–110)
CHOLEST SERPL-MCNC: 175 MG/DL (ref 110–200)
CO2 SERPL-SCNC: 27 MMOL/L (ref 20–32)
CREAT SERPL-MCNC: 0.9 MG/DL (ref 0.5–1.2)
CREATININE, URINE: 181 MG/DL
GFRAA, 66117: >60
GFRNA, 66118: >60
GLOBULIN,GLOB: 2.8 G/DL (ref 2–4)
GLUCOSE SERPL-MCNC: 101 MG/DL (ref 70–99)
HBA1C MFR BLD HPLC: 6.6 % (ref 4.8–5.6)
HDLC SERPL-MCNC: 3.8 MG/DL (ref 0–5)
HDLC SERPL-MCNC: 46 MG/DL
LDL/HDL RATIO,LDHD: 2.3
LDLC SERPL CALC-MCNC: 106 MG/DL (ref 50–99)
MICROALB/CREAT RATIO, 140286: NORMAL
MICROALBUMIN,URINE RANDOM 140054: <12 MG/L (ref 0.1–17)
NON-HDL CHOLESTEROL, 011976: 129 MG/DL
POTASSIUM SERPL-SCNC: 4.2 MMOL/L (ref 3.5–5.5)
PROT SERPL-MCNC: 7.3 G/DL (ref 6.4–8.3)
SODIUM SERPL-SCNC: 140 MMOL/L (ref 133–145)
TRIGL SERPL-MCNC: 114 MG/DL (ref 40–149)
VLDLC SERPL CALC-MCNC: 23 MG/DL (ref 8–30)

## 2022-04-29 PROBLEM — G57.12 MERALGIA PARESTHETICA OF LEFT SIDE: Status: ACTIVE | Noted: 2022-04-29

## 2022-05-03 ENCOUNTER — TELEPHONE (OUTPATIENT)
Dept: ORTHOPEDIC SURGERY | Age: 57
End: 2022-05-03

## 2022-05-03 NOTE — TELEPHONE ENCOUNTER
Spoke with patient and informed her that we don't send anything to the PCP (Dr. Abisai Bates) that we would send it straight to Pain Mgmt. In reviewing chart, I don't see where it was send to a Pain Mgmt Clinic. Informed pt I would send it off tomorrow to a pain mgmt clinic that accepts her insurance.

## 2022-05-03 NOTE — TELEPHONE ENCOUNTER
Patient called stating that the pain management referral that was sent over to her doctor Janie Contreras had no doctors name on it. Patient is requesting to have her referral sent back over to doctors office with a pain management located in [place] or [place]. Patient contact [phone]  Janie Contreras fax [phone]      Patient stated she's in a lot of pain & she just wants something to release her from that.

## 2022-05-28 DIAGNOSIS — R09.82 PND (POST-NASAL DRIP): ICD-10-CM

## 2022-05-28 DIAGNOSIS — F32.A ANXIETY AND DEPRESSION: ICD-10-CM

## 2022-05-28 DIAGNOSIS — E78.5 HYPERLIPIDEMIA, UNSPECIFIED HYPERLIPIDEMIA TYPE: ICD-10-CM

## 2022-05-28 DIAGNOSIS — J45.20 MILD INTERMITTENT ASTHMA, UNSPECIFIED WHETHER COMPLICATED: ICD-10-CM

## 2022-05-28 DIAGNOSIS — F41.9 ANXIETY AND DEPRESSION: ICD-10-CM

## 2022-05-29 RX ORDER — ALBUTEROL SULFATE 90 UG/1
AEROSOL, METERED RESPIRATORY (INHALATION)
Qty: 25.5 EACH | Refills: 2 | Status: SHIPPED | OUTPATIENT
Start: 2022-05-29

## 2022-05-29 RX ORDER — AZELASTINE 1 MG/ML
1 SPRAY, METERED NASAL 2 TIMES DAILY
Qty: 1 EACH | Refills: 1 | Status: SHIPPED | OUTPATIENT
Start: 2022-05-29 | End: 2022-08-31

## 2022-05-29 RX ORDER — PRAVASTATIN SODIUM 40 MG/1
TABLET ORAL
Qty: 90 TABLET | Refills: 0 | Status: SHIPPED | OUTPATIENT
Start: 2022-05-29

## 2022-05-29 RX ORDER — BUPROPION HYDROCHLORIDE 150 MG/1
TABLET ORAL
Qty: 90 TABLET | Refills: 0 | Status: SHIPPED | OUTPATIENT
Start: 2022-05-29 | End: 2022-06-12

## 2022-06-03 ENCOUNTER — TELEPHONE (OUTPATIENT)
Dept: FAMILY MEDICINE CLINIC | Age: 57
End: 2022-06-03

## 2022-06-03 DIAGNOSIS — J45.20 MILD INTERMITTENT ASTHMA WITHOUT COMPLICATION: Primary | ICD-10-CM

## 2022-06-03 RX ORDER — ALBUTEROL SULFATE 0.83 MG/ML
2.5 SOLUTION RESPIRATORY (INHALATION)
Qty: 30 NEBULE | Refills: 1 | Status: SHIPPED | OUTPATIENT
Start: 2022-06-03

## 2022-06-06 ENCOUNTER — OFFICE VISIT (OUTPATIENT)
Dept: ORTHOPEDIC SURGERY | Age: 57
End: 2022-06-06
Payer: MEDICAID

## 2022-06-06 VITALS
OXYGEN SATURATION: 97 % | BODY MASS INDEX: 38.79 KG/M2 | TEMPERATURE: 96.8 F | HEART RATE: 96 BPM | WEIGHT: 227.2 LBS | HEIGHT: 64 IN

## 2022-06-06 DIAGNOSIS — R20.0 NUMBNESS AND TINGLING IN RIGHT HAND: ICD-10-CM

## 2022-06-06 DIAGNOSIS — R20.2 NUMBNESS AND TINGLING IN LEFT HAND: ICD-10-CM

## 2022-06-06 DIAGNOSIS — G89.29 CHRONIC PAIN OF LEFT KNEE: ICD-10-CM

## 2022-06-06 DIAGNOSIS — G89.29 CHRONIC PAIN OF RIGHT KNEE: ICD-10-CM

## 2022-06-06 DIAGNOSIS — M17.12 PRIMARY OSTEOARTHRITIS OF LEFT KNEE: ICD-10-CM

## 2022-06-06 DIAGNOSIS — R20.0 NUMBNESS AND TINGLING IN LEFT HAND: ICD-10-CM

## 2022-06-06 DIAGNOSIS — M25.562 CHRONIC PAIN OF LEFT KNEE: ICD-10-CM

## 2022-06-06 DIAGNOSIS — M17.11 PRIMARY OSTEOARTHRITIS OF RIGHT KNEE: Primary | ICD-10-CM

## 2022-06-06 DIAGNOSIS — M25.561 CHRONIC PAIN OF RIGHT KNEE: ICD-10-CM

## 2022-06-06 DIAGNOSIS — Z98.890 STATUS POST ARTHROSCOPY OF RIGHT SHOULDER: ICD-10-CM

## 2022-06-06 DIAGNOSIS — R20.2 NUMBNESS AND TINGLING IN RIGHT HAND: ICD-10-CM

## 2022-06-06 PROCEDURE — 20610 DRAIN/INJ JOINT/BURSA W/O US: CPT | Performed by: SPECIALIST

## 2022-06-06 RX ORDER — ESOMEPRAZOLE MAGNESIUM 40 MG/1
40 CAPSULE, DELAYED RELEASE ORAL 2 TIMES DAILY
COMMUNITY
Start: 2022-05-28 | End: 2022-08-26

## 2022-06-06 RX ORDER — BETAMETHASONE SODIUM PHOSPHATE AND BETAMETHASONE ACETATE 3; 3 MG/ML; MG/ML
6 INJECTION, SUSPENSION INTRA-ARTICULAR; INTRALESIONAL; INTRAMUSCULAR; SOFT TISSUE ONCE
Status: COMPLETED | OUTPATIENT
Start: 2022-06-06 | End: 2022-06-06

## 2022-06-06 RX ADMIN — BETAMETHASONE SODIUM PHOSPHATE AND BETAMETHASONE ACETATE 6 MG: 3; 3 INJECTION, SUSPENSION INTRA-ARTICULAR; INTRALESIONAL; INTRAMUSCULAR; SOFT TISSUE at 12:19
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
## 2022-06-11 DIAGNOSIS — F41.9 ANXIETY AND DEPRESSION: ICD-10-CM

## 2022-06-11 DIAGNOSIS — F32.A ANXIETY AND DEPRESSION: ICD-10-CM

## 2022-06-11 DIAGNOSIS — I10 ESSENTIAL HYPERTENSION WITH GOAL BLOOD PRESSURE LESS THAN 130/80: ICD-10-CM

## 2022-06-12 RX ORDER — HYDROCHLOROTHIAZIDE 25 MG/1
TABLET ORAL
Qty: 90 TABLET | Refills: 1 | Status: SHIPPED | OUTPATIENT
Start: 2022-06-12 | End: 2022-08-26 | Stop reason: SDUPTHER

## 2022-06-12 RX ORDER — BUPROPION HYDROCHLORIDE 150 MG/1
TABLET ORAL
Qty: 90 TABLET | Refills: 0 | Status: SHIPPED | OUTPATIENT
Start: 2022-06-12 | End: 2022-09-06

## 2022-06-12 RX ORDER — LOSARTAN POTASSIUM 25 MG/1
TABLET ORAL
Qty: 90 TABLET | Refills: 1 | Status: SHIPPED | OUTPATIENT
Start: 2022-06-12

## 2022-06-14 ENCOUNTER — TELEPHONE (OUTPATIENT)
Dept: ORTHOPEDIC SURGERY | Age: 57
End: 2022-06-14

## 2022-06-14 NOTE — TELEPHONE ENCOUNTER
Patient called see which pain management office she is being referred to, so that she can schedule and appointment. Please advise. Patient can be reached at [phone].

## 2022-06-14 NOTE — TELEPHONE ENCOUNTER
Patient called back to follow up on previous message. Patient is requesting to go the Pain Management office [facility]. Patient states the number to the office is [phone].         Patient can be reached at [phone]

## 2022-06-17 NOTE — TELEPHONE ENCOUNTER
Per pt's request, [facility]. Pt aware and given their number to contact them directly for an appt.     [phone]

## 2022-07-29 ENCOUNTER — OFFICE VISIT (OUTPATIENT)
Dept: ORTHOPEDIC SURGERY | Age: 57
End: 2022-07-29
Payer: MEDICARE

## 2022-07-29 VITALS
HEART RATE: 105 BPM | BODY MASS INDEX: 38.12 KG/M2 | TEMPERATURE: 96.8 F | OXYGEN SATURATION: 97 % | WEIGHT: 228.8 LBS | HEIGHT: 65 IN

## 2022-07-29 DIAGNOSIS — M54.12 CERVICAL RADICULOPATHY: ICD-10-CM

## 2022-07-29 DIAGNOSIS — M25.511 CHRONIC RIGHT SHOULDER PAIN: Primary | ICD-10-CM

## 2022-07-29 DIAGNOSIS — M19.011 ARTHRITIS OF SHOULDER REGION, RIGHT: ICD-10-CM

## 2022-07-29 DIAGNOSIS — G89.29 CHRONIC RIGHT SHOULDER PAIN: Primary | ICD-10-CM

## 2022-07-29 DIAGNOSIS — M54.2 NECK PAIN: ICD-10-CM

## 2022-07-29 PROCEDURE — G9899 SCRN MAM PERF RSLTS DOC: HCPCS | Performed by: SPECIALIST

## 2022-07-29 PROCEDURE — 99213 OFFICE O/P EST LOW 20 MIN: CPT | Performed by: SPECIALIST

## 2022-07-29 PROCEDURE — G8427 DOCREV CUR MEDS BY ELIG CLIN: HCPCS | Performed by: SPECIALIST

## 2022-07-29 PROCEDURE — G9717 DOC PT DX DEP/BP F/U NT REQ: HCPCS | Performed by: SPECIALIST

## 2022-07-29 PROCEDURE — 3017F COLORECTAL CA SCREEN DOC REV: CPT | Performed by: SPECIALIST

## 2022-07-29 PROCEDURE — G8756 NO BP MEASURE DOC: HCPCS | Performed by: SPECIALIST

## 2022-07-29 PROCEDURE — G8417 CALC BMI ABV UP PARAM F/U: HCPCS | Performed by: SPECIALIST

## 2022-07-29 PROCEDURE — 20552 NJX 1/MLT TRIGGER POINT 1/2: CPT | Performed by: SPECIALIST

## 2022-07-29 RX ORDER — BETAMETHASONE SODIUM PHOSPHATE AND BETAMETHASONE ACETATE 3; 3 MG/ML; MG/ML
3 INJECTION, SUSPENSION INTRA-ARTICULAR; INTRALESIONAL; INTRAMUSCULAR; SOFT TISSUE ONCE
Status: COMPLETED | OUTPATIENT
Start: 2022-07-29 | End: 2022-07-29

## 2022-07-29 RX ADMIN — BETAMETHASONE SODIUM PHOSPHATE AND BETAMETHASONE ACETATE 3 MG: 3; 3 INJECTION, SUSPENSION INTRA-ARTICULAR; INTRALESIONAL; INTRAMUSCULAR; SOFT TISSUE at 10:09
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
## 2022-08-04 DIAGNOSIS — E11.9 TYPE 2 DIABETES MELLITUS WITHOUT COMPLICATION, WITHOUT LONG-TERM CURRENT USE OF INSULIN (HCC): ICD-10-CM

## 2022-08-04 DIAGNOSIS — G47.00 INSOMNIA, UNSPECIFIED TYPE: ICD-10-CM

## 2022-08-04 RX ORDER — METFORMIN HYDROCHLORIDE 500 MG/1
TABLET, EXTENDED RELEASE ORAL
Qty: 30 TABLET | Refills: 11 | Status: SHIPPED | OUTPATIENT
Start: 2022-08-04

## 2022-08-04 RX ORDER — TRAZODONE HYDROCHLORIDE 150 MG/1
TABLET ORAL
Qty: 90 TABLET | Refills: 0 | Status: SHIPPED | OUTPATIENT
Start: 2022-08-04 | End: 2022-09-06

## 2022-08-26 ENCOUNTER — OFFICE VISIT (OUTPATIENT)
Dept: FAMILY MEDICINE CLINIC | Age: 57
End: 2022-08-26
Payer: MEDICARE

## 2022-08-26 VITALS
BODY MASS INDEX: 37.49 KG/M2 | TEMPERATURE: 98.3 F | HEIGHT: 65 IN | SYSTOLIC BLOOD PRESSURE: 107 MMHG | WEIGHT: 225 LBS | HEART RATE: 62 BPM | RESPIRATION RATE: 18 BRPM | OXYGEN SATURATION: 99 % | DIASTOLIC BLOOD PRESSURE: 63 MMHG

## 2022-08-26 DIAGNOSIS — E11.9 TYPE 2 DIABETES MELLITUS WITHOUT COMPLICATION, WITHOUT LONG-TERM CURRENT USE OF INSULIN (HCC): ICD-10-CM

## 2022-08-26 DIAGNOSIS — I10 ESSENTIAL HYPERTENSION WITH GOAL BLOOD PRESSURE LESS THAN 130/80: ICD-10-CM

## 2022-08-26 DIAGNOSIS — Z00.00 WELCOME TO MEDICARE PREVENTIVE VISIT: Primary | ICD-10-CM

## 2022-08-26 DIAGNOSIS — E78.2 MIXED HYPERLIPIDEMIA: ICD-10-CM

## 2022-08-26 DIAGNOSIS — Z71.89 ADVANCED CARE PLANNING/COUNSELING DISCUSSION: ICD-10-CM

## 2022-08-26 LAB — HBA1C MFR BLD HPLC: 6.4 %

## 2022-08-26 PROCEDURE — 2022F DILAT RTA XM EVC RTNOPTHY: CPT | Performed by: NURSE PRACTITIONER

## 2022-08-26 PROCEDURE — G8754 DIAS BP LESS 90: HCPCS | Performed by: NURSE PRACTITIONER

## 2022-08-26 PROCEDURE — G8417 CALC BMI ABV UP PARAM F/U: HCPCS | Performed by: NURSE PRACTITIONER

## 2022-08-26 PROCEDURE — G8752 SYS BP LESS 140: HCPCS | Performed by: NURSE PRACTITIONER

## 2022-08-26 PROCEDURE — G0402 INITIAL PREVENTIVE EXAM: HCPCS | Performed by: NURSE PRACTITIONER

## 2022-08-26 PROCEDURE — G9899 SCRN MAM PERF RSLTS DOC: HCPCS | Performed by: NURSE PRACTITIONER

## 2022-08-26 PROCEDURE — 3017F COLORECTAL CA SCREEN DOC REV: CPT | Performed by: NURSE PRACTITIONER

## 2022-08-26 PROCEDURE — G8427 DOCREV CUR MEDS BY ELIG CLIN: HCPCS | Performed by: NURSE PRACTITIONER

## 2022-08-26 PROCEDURE — 3044F HG A1C LEVEL LT 7.0%: CPT | Performed by: NURSE PRACTITIONER

## 2022-08-26 PROCEDURE — G9717 DOC PT DX DEP/BP F/U NT REQ: HCPCS | Performed by: NURSE PRACTITIONER

## 2022-08-26 PROCEDURE — 83036 HEMOGLOBIN GLYCOSYLATED A1C: CPT | Performed by: NURSE PRACTITIONER

## 2022-08-26 PROCEDURE — 99214 OFFICE O/P EST MOD 30 MIN: CPT | Performed by: NURSE PRACTITIONER

## 2022-08-26 RX ORDER — HYDROCHLOROTHIAZIDE 25 MG/1
25 TABLET ORAL DAILY
Qty: 90 TABLET | Refills: 3 | Status: SHIPPED | OUTPATIENT
Start: 2022-08-26

## 2022-08-26 NOTE — PROGRESS NOTES
Room 9     Patient present today for Point of Care A1C of 6.4    When asked if patient has any concerns he would like to address with RAVI Billingsley patient states No .      Did patient bring someone? No    Did the patient have DME equipment? Yes rollator with seat     Did you take your medication today? Yes       1. \"Have you been to the ER, urgent care clinic since your last visit? Hospitalized since your last visit? \" No    2. \"Have you seen or consulted any other health care providers outside of the [address] [place] [address] since your last visit? \" Yes Patient states my spine doctor Dr. Mine Umaña states I have a pinch nerve in my right shoulder. 3. For patients aged 39-70: Has the patient had a colonoscopy / FIT/ Cologuard? Yes - no Care Gap present      If the patient is female:    4. For patients aged 41-77: Has the patient had a mammogram within the past 2 years? Yes - no Care Gap present      5. For patients aged 21-65: Has the patient had a pap smear?  No Patient states I will schedule a pap with RAVI Mayorga Salts         3 most recent PHQ Screens 4/26/2022   Little interest or pleasure in doing things Several days   Feeling down, depressed, irritable, or hopeless Several days   Total Score PHQ 2 2   Trouble falling or staying asleep, or sleeping too much -   Feeling tired or having little energy -   Poor appetite, weight loss, or overeating -   Feeling bad about yourself - or that you are a failure or have let yourself or your family down -   Trouble concentrating on things such as school, work, reading, or watching TV -   Moving or speaking so slowly that other people could have noticed; or the opposite being so fidgety that others notice -   Thoughts of being better off dead, or hurting yourself in some way -   PHQ 9 Score -         Health Maintenance Due   Topic Date Due    Pneumococcal 0-64 years (1 - PCV) Never done    Eye Exam Retinal or Dilated  Never done    Cervical cancer screen  06/22/2020 DTaP/Tdap/Td series (2 - Td or Tdap) 11/19/2020    Foot Exam Q1  08/24/2021    Shingrix Vaccine Age 50> (2 of 2) 03/18/2022    COVID-19 Vaccine (4 - Booster for Moderna series) 05/21/2022    Medicare Yearly Exam  Never done       No flowsheet data found.

## 2022-08-26 NOTE — PATIENT INSTRUCTIONS
Medicare Wellness Visit, Female     The best way to live healthy is to have a lifestyle where you eat a well-balanced diet, exercise regularly, limit alcohol use, and quit all forms of tobacco/nicotine, if applicable. Regular preventive services are another way to keep healthy. Preventive services (vaccines, screening tests, monitoring & exams) can help personalize your care plan, which helps you manage your own care. Screening tests can find health problems at the earliest stages, when they are easiest to treat. [facility] follows the current, evidence-based guidelines published by the [facility] Taylor ([facility]) when recommending preventive services for our patients. Because we follow these guidelines, sometimes recommendations change over time as research supports it. (For example, mammograms used to be recommended annually. Even though Medicare will still pay for an annual mammogram, the newer guidelines recommend a mammogram every two years for women of average risk). Of course, you and your doctor may decide to screen more often for some diseases, based on your risk and your co-morbidities (chronic disease you are already diagnosed with). Preventive services for you include:  - Medicare offers their members a free annual wellness visit, which is time for you and your primary care provider to discuss and plan for your preventive service needs. Take advantage of this benefit every year!  -All adults over the age of 72 should receive the recommended pneumonia vaccines. Current USPSTF guidelines recommend a series of two vaccines for the best pneumonia protection.   -All adults should have a flu vaccine yearly and a tetanus vaccine every 10 years.   -All adults age 48 and older should receive the shingles vaccines (series of two vaccines).       -All adults age 38-68 who are overweight should have a diabetes screening test once every three years.   -All adults born between [birth year] and 1965 should be screened once for Hepatitis C.  -Other screening tests and preventive services for persons with diabetes include: an eye exam to screen for diabetic retinopathy, a kidney function test, a foot exam, and stricter control over your cholesterol.   -Cardiovascular screening for adults with routine risk involves an electrocardiogram (ECG) at intervals determined by your doctor.   -Colorectal cancer screenings should be done for adults age 54-65 with no increased risk factors for colorectal cancer. There are a number of acceptable methods of screening for this type of cancer. Each test has its own benefits and drawbacks. Discuss with your doctor what is most appropriate for you during your annual wellness visit. The different tests include: colonoscopy (considered the best screening method), a fecal occult blood test, a fecal DNA test, and sigmoidoscopy.    -A bone mass density test is recommended when a woman turns 65 to screen for osteoporosis. This test is only recommended one time, as a screening. Some providers will use this same test as a disease monitoring tool if you already have osteoporosis. -Breast cancer screenings are recommended every other year for women of normal risk, age 54-69.  -Cervical cancer screenings for women over age 72 are only recommended with certain risk factors.      Here is a list of your current Health Maintenance items (your personalized list of preventive services) with a due date:  Health Maintenance Due   Topic Date Due    Pneumococcal Vaccine (1 - PCV) Never done    Eye Exam  Never done    Cervical cancer screen  06/22/2020    DTaP/Tdap/Td  (2 - Td or Tdap) 11/19/2020    Diabetic Foot Care  08/24/2021    Shingles Vaccine (2 of 2) 03/18/2022    COVID-19 Vaccine (4 - Booster for Cornelious Landsman series) 05/21/2022
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
## 2022-08-31 DIAGNOSIS — R09.82 PND (POST-NASAL DRIP): ICD-10-CM

## 2022-08-31 RX ORDER — AZELASTINE 1 MG/ML
1 SPRAY, METERED NASAL 2 TIMES DAILY
Qty: 1 EACH | Refills: 1 | Status: SHIPPED | OUTPATIENT
Start: 2022-08-31

## 2022-09-06 DIAGNOSIS — F32.A ANXIETY AND DEPRESSION: ICD-10-CM

## 2022-09-06 DIAGNOSIS — G47.00 INSOMNIA, UNSPECIFIED TYPE: ICD-10-CM

## 2022-09-06 DIAGNOSIS — F41.9 ANXIETY AND DEPRESSION: ICD-10-CM

## 2022-09-06 RX ORDER — TRAZODONE HYDROCHLORIDE 150 MG/1
TABLET ORAL
Qty: 90 TABLET | Refills: 0 | Status: SHIPPED | OUTPATIENT
Start: 2022-09-06

## 2022-09-06 RX ORDER — BUPROPION HYDROCHLORIDE 150 MG/1
TABLET ORAL
Qty: 90 TABLET | Refills: 0 | Status: SHIPPED | OUTPATIENT
Start: 2022-09-06

## 2022-10-10 DIAGNOSIS — J30.89 NON-SEASONAL ALLERGIC RHINITIS, UNSPECIFIED TRIGGER: ICD-10-CM

## 2022-10-10 RX ORDER — FLUTICASONE PROPIONATE 50 MCG
SPRAY, SUSPENSION (ML) NASAL
Qty: 1 EACH | Refills: 11 | Status: SHIPPED | OUTPATIENT
Start: 2022-10-10

## 2022-10-28 ENCOUNTER — OFFICE VISIT (OUTPATIENT)
Dept: ORTHOPEDIC SURGERY | Age: 57
End: 2022-10-28
Payer: MEDICARE

## 2022-10-28 VITALS — TEMPERATURE: 97.3 F | HEIGHT: 65 IN | WEIGHT: 230 LBS | BODY MASS INDEX: 38.32 KG/M2

## 2022-10-28 DIAGNOSIS — G89.29 CHRONIC RIGHT SHOULDER PAIN: ICD-10-CM

## 2022-10-28 DIAGNOSIS — R20.0 NUMBNESS AND TINGLING IN LEFT HAND: Primary | ICD-10-CM

## 2022-10-28 DIAGNOSIS — M25.511 CHRONIC RIGHT SHOULDER PAIN: ICD-10-CM

## 2022-10-28 DIAGNOSIS — R20.2 NUMBNESS AND TINGLING IN LEFT HAND: Primary | ICD-10-CM

## 2022-10-28 PROCEDURE — 99213 OFFICE O/P EST LOW 20 MIN: CPT | Performed by: SPECIALIST

## 2022-10-28 PROCEDURE — 3017F COLORECTAL CA SCREEN DOC REV: CPT | Performed by: SPECIALIST

## 2022-10-28 PROCEDURE — G8427 DOCREV CUR MEDS BY ELIG CLIN: HCPCS | Performed by: SPECIALIST

## 2022-10-28 PROCEDURE — G9899 SCRN MAM PERF RSLTS DOC: HCPCS | Performed by: SPECIALIST

## 2022-10-28 PROCEDURE — G8756 NO BP MEASURE DOC: HCPCS | Performed by: SPECIALIST

## 2022-10-28 PROCEDURE — G8417 CALC BMI ABV UP PARAM F/U: HCPCS | Performed by: SPECIALIST

## 2022-10-28 PROCEDURE — G9717 DOC PT DX DEP/BP F/U NT REQ: HCPCS | Performed by: SPECIALIST

## 2022-10-28 RX ORDER — DICLOFENAC SODIUM 10 MG/G
GEL TOPICAL 4 TIMES DAILY
Qty: 5 EACH | Refills: 5 | Status: SHIPPED | OUTPATIENT
Start: 2022-10-28
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
## 2022-11-11 DIAGNOSIS — R09.82 PND (POST-NASAL DRIP): ICD-10-CM

## 2022-11-11 RX ORDER — AZELASTINE 1 MG/ML
1 SPRAY, METERED NASAL 2 TIMES DAILY
Qty: 1 EACH | Refills: 4 | Status: SHIPPED | OUTPATIENT
Start: 2022-11-11

## 2022-11-13 DIAGNOSIS — G47.00 INSOMNIA, UNSPECIFIED TYPE: ICD-10-CM

## 2022-11-13 DIAGNOSIS — J45.20 MILD INTERMITTENT ASTHMA, UNSPECIFIED WHETHER COMPLICATED: ICD-10-CM

## 2022-11-15 ENCOUNTER — TELEPHONE (OUTPATIENT)
Dept: ORTHOPEDIC SURGERY | Age: 57
End: 2022-11-15

## 2022-11-16 RX ORDER — MONTELUKAST SODIUM 10 MG/1
TABLET ORAL
Qty: 90 TABLET | Refills: 1 | Status: SHIPPED | OUTPATIENT
Start: 2022-11-16

## 2022-11-16 RX ORDER — TRAZODONE HYDROCHLORIDE 150 MG/1
TABLET ORAL
Qty: 90 TABLET | Refills: 0 | Status: SHIPPED | OUTPATIENT
Start: 2022-11-16

## 2022-11-30 ENCOUNTER — OFFICE VISIT (OUTPATIENT)
Dept: ORTHOPEDIC SURGERY | Age: 57
End: 2022-11-30
Payer: MEDICARE

## 2022-11-30 VITALS
RESPIRATION RATE: 18 BRPM | WEIGHT: 234 LBS | BODY MASS INDEX: 38.99 KG/M2 | OXYGEN SATURATION: 97 % | HEIGHT: 65 IN | HEART RATE: 94 BPM

## 2022-11-30 DIAGNOSIS — M25.511 CHRONIC RIGHT SHOULDER PAIN: Primary | ICD-10-CM

## 2022-11-30 DIAGNOSIS — G89.29 CHRONIC RIGHT SHOULDER PAIN: Primary | ICD-10-CM

## 2022-11-30 DIAGNOSIS — M75.51 SCAPULOTHORACIC BURSITIS OF RIGHT SHOULDER: ICD-10-CM

## 2022-11-30 NOTE — PROGRESS NOTES
Patient: Ruébn Blanton                MRN: [id number]       SSN: [id number]  YOB: 1965        AGE: 56 y.o. SEX: female  Body mass index is 38.94 kg/m². PCP: Jairo Villagomez NP  11/30/22    Chief Complaint: Right shoulder pain     HPI: Rubén Blanton is a 56 y.o. female patient who returns to the office today for her right shoulder. It has been over a year since I have last seen her. She is complaining of pain in the posterior aspect of her shoulder along the medial border of her scapula. She is also complaining of pain and numbness and tingling that radiates from her neck down to her hands bilaterally. She is seeing someone for pain management for this. She has been recommended for neck MRI but it has not been approved. The pain in her shoulder is worse at night. She describes the pain is mostly posterior and with use of the arm. Past Medical History:   Diagnosis Date    Allergic rhinitis     Anxiety and depression     Asthma 11/3/2010    Chronic pain in right shoulder     Depression 10/19/2015    Diabetes ([address]     Eczema 10/19/2015    Head pain 4/28/2014    HTN (hypertension)     Multiple environmental allergies     Nicotine dependence     Syphilis in female 3/2/2015       Family History   Problem Relation Age of Onset    Breast Cancer Maternal Grandmother     Cancer Mother        Current Outpatient Medications   Medication Sig Dispense Refill    montelukast (SINGULAIR) 10 mg tablet TAKE 1 TABLET BY MOUTH EVERY DAY 90 Tablet 1    traZODone (DESYREL) 150 mg tablet TAKE 1 TABLET BY MOUTH EVERY DAY AT NIGHT 90 Tablet 0    azelastine (ASTELIN) 137 mcg (0.1 %) nasal spray 1 SPRAY BY BOTH NOSTRILS ROUTE TWO (2) TIMES A DAY. USE IN EACH NOSTRIL AS DIRECTED 1 Each 4    diclofenac (VOLTAREN) 1 % gel Apply  to affected area four (4) times daily. Apply up to 4g to affected area up to 4 times daily.  5 Each 5    fluticasone propionate (FLONASE) 50 mcg/actuation nasal spray USE 2 SPRAYS IN EACH NOSTRIL ONCE DAILY 1 Each 11    buPROPion XL (WELLBUTRIN XL) 150 mg tablet TAKE 1 TABLET BY MOUTH EVERY DAY 90 Tablet 0    hydroCHLOROthiazide (HYDRODIURIL) 25 mg tablet Take 1 Tablet by mouth daily. 90 Tablet 3    metFORMIN ER (GLUCOPHAGE XR) 500 mg tablet TAKE 1 TABLET BY MOUTH EVERY DAY WITH DINNER 30 Tablet 11    losartan (COZAAR) 25 mg tablet TAKE 1 TABLET BY MOUTH EVERY DAY 90 Tablet 1    albuterol (PROVENTIL VENTOLIN) 2.5 mg /3 mL (0.083 %) nebu 3 mL by Nebulization route every four (4) hours as needed for Wheezing. 30 Nebule 1    ProAir HFA 90 mcg/actuation inhaler INHALE 2 PUFFS EVERY 6 HOURS AS NEEDED FOR WHEEZE 25.5 Each 2    pravastatin (PRAVACHOL) 40 mg tablet TAKE 1 TABLET BY MOUTH EVERY DAY AT NIGHT 90 Tablet 0    fluticasone-Salmeterol (Advair HFA) 45-21 mcg/actuation inhaler Take 2 Puffs by inhalation two (2) times a day.  12 g 5    diclofenac (VOLTAREN) 1 % gel APPLY 4 GRAMS TO AFFECTED AREA FOUR TIMES DAILY 100 g 11    ketoconazole (NIZORAL) 2 % topical cream Apply once daily to cover the affected and immediate surrounding area for 2 weeks 30 g 2       Allergies   Allergen Reactions    Aspirin Nausea and Vomiting    Dog Dander Swelling    Ibuprofen Unknown (comments)     She doesn't know exactly why she is not allowed to take it    Lisinopril Cough    Mold Itching    Pollen Extracts Itching    Prednisone Swelling       Past Surgical History:   Procedure Laterality Date    ENDOSCOPY, COLON, DIAGNOSTIC      HX TUBAL LIGATION Bilateral     PA ANESTH,SURGERY OF SHOULDER         Social History     Socioeconomic History    Marital status:      Spouse name: Not on file    Number of children: Not on file    Years of education: Not on file    Highest education level: Not on file   Occupational History    Not on file   Tobacco Use    Smoking status: Every Day     Packs/day: 0.50     Years: 35.00     Pack years: 17.50     Types: Cigarettes    Smokeless tobacco: Never Tobacco comments:     quit in january, smokes one occ   Vaping Use    Vaping Use: Never used   Substance and Sexual Activity    Alcohol use: Yes     Alcohol/week: 2.0 standard drinks     Types: 2 Shots of liquor per week    Drug use: Yes     Types: Cocaine    Sexual activity: Yes     Partners: Male   Other Topics Concern    Not on file   Social History Narrative    Not on file     Social Determinants of Health     Financial Resource Strain: Not on file   Food Insecurity: Not on file   Transportation Needs: Not on file   Physical Activity: Not on file   Stress: Not on file   Social Connections: Not on file   Intimate Partner Violence: Not on file   Housing Stability: Not on file       REVIEW OF SYSTEMS:      No changes from previous review of systems unless noted. PHYSICAL EXAMINATION:  Visit Vitals  Pulse 94   Resp 18   Ht 5' 5\" (1.651 m)   Wt 234 lb (106.1 kg)   SpO2 97%   BMI 38.94 kg/m²     Body mass index is 38.94 kg/m². GENERAL: Alert and oriented x3, in no acute distress. HEENT: Normocephalic, atraumatic. Shoulder Examination     R   L  ROM   FF  Full   Full  ER  Full   Full   IR  Full   Full  Rotator Cuff Pain   Supra  -   -   Infra  -   -   Subscap -   -  Crepitus  -   -  Effusion  -   -  Warmth  -   -   Erythema  -   -  Instability  -   -  AC Joint TTP  -   -  Clavicle   Deformity -   -   TTP  -   -  Proximal Humerus   Deformity -   -   TTP  -   -  Deltoid Strength 5   5  Biceps Strength 5   5  Biceps Deformity -   -  Biceps Groove Pain -   -  Impingement Sign -   -   Tender to palpation along the medial border of the scapula. IMAGING:  Imaging read by myself and interpreted as follows:  X-rays 4 views of the right shoulder were taken in the office today which do not show any acute bony abnormalities.     ASSESSMENT & PLAN  Diagnosis: Right shoulder scapulothoracic bursitis and medial scapular pain    I have recommended a topical anti-inflammatory which she has previously been prescribed for her knees which she can use on her shoulder. We also discussed physical therapy. She would like to hold off on that for now. I do not see anything intrinsically wrong with her shoulder and her rotator cuff repair from 2 years ago seems to be doing well. I do think she is having some issues with her neck and radicular symptoms from her cervical spine and I would also recommend an MRI of her neck that is previously been ordered. Prescription medication management discussed with patient. Electronically signed by: Nhung Hinojosa MD    Note: This note was completed using voice recognition software.   Any typographical/name errors or mistakes are unintentional.

## 2022-12-03 DIAGNOSIS — E78.5 HYPERLIPIDEMIA, UNSPECIFIED HYPERLIPIDEMIA TYPE: ICD-10-CM

## 2022-12-05 RX ORDER — PRAVASTATIN SODIUM 40 MG/1
TABLET ORAL
Qty: 90 TABLET | Refills: 0 | Status: SHIPPED | OUTPATIENT
Start: 2022-12-05

## 2022-12-28 ENCOUNTER — HOSPITAL ENCOUNTER (OUTPATIENT)
Dept: LAB | Age: 57
Discharge: HOME | End: 2022-12-28
Payer: MEDICARE

## 2022-12-28 LAB
ALBUMIN SERPL-MCNC: 3.2 G/DL (ref 3.4–5)
ALBUMIN/GLOB SERPL: 1 {RATIO} (ref 0.8–1.7)
ALP SERPL-CCNC: 92 U/L (ref 45–117)
ALT SERPL-CCNC: 22 U/L (ref 13–56)
ANION GAP SERPL CALC-SCNC: 4 MMOL/L (ref 3–18)
AST SERPL-CCNC: 14 U/L (ref 10–38)
BILIRUB SERPL-MCNC: 0.2 MG/DL (ref 0.2–1)
BUN SERPL-MCNC: 13 MG/DL (ref 7–18)
BUN/CREAT SERPL: 12 (ref 12–20)
CALCIUM SERPL-MCNC: 9.9 MG/DL (ref 8.5–10.1)
CHLORIDE SERPL-SCNC: 102 MMOL/L (ref 100–111)
CHOLEST SERPL-MCNC: 157 MG/DL
CO2 SERPL-SCNC: 34 MMOL/L (ref 21–32)
CREAT SERPL-MCNC: 1.05 MG/DL (ref 0.6–1.3)
CREAT UR-MCNC: 511 MG/DL (ref 30–125)
EST. AVERAGE GLUCOSE BLD GHB EST-MCNC: 146 MG/DL
GLOBULIN SER CALC-MCNC: 3.3 G/DL (ref 2–4)
GLUCOSE SERPL-MCNC: 128 MG/DL (ref 74–99)
HBA1C MFR BLD: 6.7 % (ref 4.2–5.6)
HDLC SERPL-MCNC: 41 MG/DL (ref 40–60)
HDLC SERPL: 3.8 {RATIO} (ref 0–5)
LDLC SERPL CALC-MCNC: 94.2 MG/DL (ref 0–100)
LIPID PROFILE,FLP: NORMAL
MICROALBUMIN UR-MCNC: 1.9 MG/DL (ref 0–3)
MICROALBUMIN/CREAT UR-RTO: 4 MG/G (ref 0–30)
POTASSIUM SERPL-SCNC: 3.6 MMOL/L (ref 3.5–5.5)
PROT SERPL-MCNC: 6.5 G/DL (ref 6.4–8.2)
SODIUM SERPL-SCNC: 140 MMOL/L (ref 136–145)
TRIGL SERPL-MCNC: 109 MG/DL (ref ?–150)
VLDLC SERPL CALC-MCNC: 21.8 MG/DL

## 2022-12-28 PROCEDURE — 80053 COMPREHEN METABOLIC PANEL: CPT

## 2022-12-28 PROCEDURE — 82043 UR ALBUMIN QUANTITATIVE: CPT

## 2022-12-28 PROCEDURE — 83036 HEMOGLOBIN GLYCOSYLATED A1C: CPT

## 2022-12-28 PROCEDURE — 80061 LIPID PANEL: CPT

## 2022-12-28 PROCEDURE — 36415 COLL VENOUS BLD VENIPUNCTURE: CPT

## 2023-01-12 ENCOUNTER — OFFICE VISIT (OUTPATIENT)
Dept: FAMILY MEDICINE CLINIC | Age: 58
End: 2023-01-12
Payer: MEDICAID

## 2023-01-12 VITALS
SYSTOLIC BLOOD PRESSURE: 106 MMHG | WEIGHT: 223 LBS | HEIGHT: 65 IN | OXYGEN SATURATION: 98 % | RESPIRATION RATE: 20 BRPM | TEMPERATURE: 98 F | BODY MASS INDEX: 37.15 KG/M2 | DIASTOLIC BLOOD PRESSURE: 62 MMHG | HEART RATE: 91 BPM

## 2023-01-12 DIAGNOSIS — Z11.3 SCREEN FOR STD (SEXUALLY TRANSMITTED DISEASE): ICD-10-CM

## 2023-01-12 DIAGNOSIS — I10 ESSENTIAL HYPERTENSION WITH GOAL BLOOD PRESSURE LESS THAN 130/80: ICD-10-CM

## 2023-01-12 DIAGNOSIS — J45.20 MILD INTERMITTENT ASTHMA, UNSPECIFIED WHETHER COMPLICATED: ICD-10-CM

## 2023-01-12 DIAGNOSIS — E78.2 MIXED HYPERLIPIDEMIA: ICD-10-CM

## 2023-01-12 DIAGNOSIS — N89.8 VAGINAL ODOR: ICD-10-CM

## 2023-01-12 DIAGNOSIS — E11.49 OTHER DIABETIC NEUROLOGICAL COMPLICATION ASSOCIATED WITH TYPE 2 DIABETES MELLITUS (HCC): ICD-10-CM

## 2023-01-12 DIAGNOSIS — E11.9 TYPE 2 DIABETES MELLITUS WITHOUT COMPLICATION, WITHOUT LONG-TERM CURRENT USE OF INSULIN (HCC): Primary | ICD-10-CM

## 2023-01-12 DIAGNOSIS — M54.41 ACUTE RIGHT-SIDED LOW BACK PAIN WITH RIGHT-SIDED SCIATICA: ICD-10-CM

## 2023-01-12 LAB
BILIRUB UR QL STRIP: NEGATIVE
GLUCOSE UR-MCNC: NEGATIVE MG/DL
KETONES P FAST UR STRIP-MCNC: NEGATIVE MG/DL
PH UR STRIP: 6 [PH] (ref 4.6–8)
PROT UR QL STRIP: NEGATIVE
SP GR UR STRIP: 1.03 (ref 1–1.03)
UA UROBILINOGEN AMB POC: NORMAL (ref 0.2–1)
URINALYSIS CLARITY POC: CLEAR
URINALYSIS COLOR POC: NORMAL
URINE BLOOD POC: NORMAL
URINE LEUKOCYTES POC: NEGATIVE
URINE NITRITES POC: NEGATIVE

## 2023-01-12 PROCEDURE — G8417 CALC BMI ABV UP PARAM F/U: HCPCS | Performed by: NURSE PRACTITIONER

## 2023-01-12 PROCEDURE — G9717 DOC PT DX DEP/BP F/U NT REQ: HCPCS | Performed by: NURSE PRACTITIONER

## 2023-01-12 PROCEDURE — G8427 DOCREV CUR MEDS BY ELIG CLIN: HCPCS | Performed by: NURSE PRACTITIONER

## 2023-01-12 PROCEDURE — 81003 URINALYSIS AUTO W/O SCOPE: CPT | Performed by: NURSE PRACTITIONER

## 2023-01-12 PROCEDURE — 99214 OFFICE O/P EST MOD 30 MIN: CPT | Performed by: NURSE PRACTITIONER

## 2023-01-12 PROCEDURE — 2022F DILAT RTA XM EVC RTNOPTHY: CPT | Performed by: NURSE PRACTITIONER

## 2023-01-12 PROCEDURE — 3078F DIAST BP <80 MM HG: CPT | Performed by: NURSE PRACTITIONER

## 2023-01-12 PROCEDURE — 3046F HEMOGLOBIN A1C LEVEL >9.0%: CPT | Performed by: NURSE PRACTITIONER

## 2023-01-12 PROCEDURE — 3074F SYST BP LT 130 MM HG: CPT | Performed by: NURSE PRACTITIONER

## 2023-01-12 PROCEDURE — G9899 SCRN MAM PERF RSLTS DOC: HCPCS | Performed by: NURSE PRACTITIONER

## 2023-01-12 PROCEDURE — 3017F COLORECTAL CA SCREEN DOC REV: CPT | Performed by: NURSE PRACTITIONER

## 2023-01-12 RX ORDER — ALBUTEROL SULFATE 90 UG/1
AEROSOL, METERED RESPIRATORY (INHALATION)
Qty: 25.5 EACH | Refills: 2 | Status: SHIPPED | OUTPATIENT
Start: 2023-01-12

## 2023-01-12 RX ORDER — CYCLOBENZAPRINE HCL 5 MG
5-10 TABLET ORAL
Qty: 30 TABLET | Refills: 1 | Status: SHIPPED | OUTPATIENT
Start: 2023-01-12

## 2023-01-12 RX ORDER — TRAMADOL HYDROCHLORIDE 50 MG/1
50 TABLET ORAL
Qty: 12 TABLET | Refills: 0 | Status: SHIPPED | OUTPATIENT
Start: 2023-01-12 | End: 2023-01-15

## 2023-01-12 RX ORDER — FLUTICASONE PROPIONATE AND SALMETEROL XINAFOATE 45; 21 UG/1; UG/1
2 AEROSOL, METERED RESPIRATORY (INHALATION) 2 TIMES DAILY
Qty: 12 G | Refills: 5 | Status: SHIPPED | OUTPATIENT
Start: 2023-01-12

## 2023-01-12 RX ORDER — MONTELUKAST SODIUM 10 MG/1
10 TABLET ORAL DAILY
Qty: 90 TABLET | Refills: 1 | Status: SHIPPED | OUTPATIENT
Start: 2023-01-12

## 2023-01-12 RX ORDER — METFORMIN HYDROCHLORIDE 500 MG/1
TABLET, EXTENDED RELEASE ORAL
Qty: 30 TABLET | Refills: 11 | Status: SHIPPED | OUTPATIENT
Start: 2023-01-12

## 2023-01-12 NOTE — PROGRESS NOTES
Room 7     Patient presents today for point of care urinalysis due to vaginal odor and severe \back pain of left side of back . Patient states my back is killing me and I have a mild vaginal odor and I had sex with someone I knew for 14 years . When asked if patient has received the mammogram patient states no . When asked if patient has any concerns she would like to address with RAVI Billingsley patient states yes the right side of my back is in pain. Did patient bring someone? No    Did the patient have DME equipment? No     Did you take your medication today? Yes       1. \"Have you been to the ER, urgent care clinic since your last visit? Hospitalized since your last visit? \" No    2. \"Have you seen or consulted any other health care providers outside of the [address] [place] [address] since your last visit? \" No     3. For patients aged 39-70: Has the patient had a colonoscopy / FIT/ Cologuard? Yes - no Care Gap present      If the patient is female:    4. For patients aged 41-77: Has the patient had a mammogram within the past 2 years? No Patient order is placed       5. For patients aged 21-65: Has the patient had a pap smear?  No Patient states I will schedule an appointment with RAVI Higuera for pap.          3 most recent PHQ Screens 1/12/2023   Little interest or pleasure in doing things Several days   Feeling down, depressed, irritable, or hopeless Several days   Total Score PHQ 2 2   Trouble falling or staying asleep, or sleeping too much -   Feeling tired or having little energy -   Poor appetite, weight loss, or overeating -   Feeling bad about yourself - or that you are a failure or have let yourself or your family down -   Trouble concentrating on things such as school, work, reading, or watching TV -   Moving or speaking so slowly that other people could have noticed; or the opposite being so fidgety that others notice -   Thoughts of being better off dead, or hurting yourself in some way - PHQ 9 Score -         Health Maintenance Due   Topic Date Due    Pneumococcal 0-64 years (1 - PCV) Never done    Eye Exam Retinal or Dilated  Never done    Hepatitis B Vaccine (1 of 3 - Risk 3-dose series) Never done    Cervical cancer screen  06/22/2020    DTaP/Tdap/Td series (2 - Td or Tdap) 11/19/2020    Foot Exam Q1  08/24/2021    Shingles Vaccine (2 of 2) 03/18/2022    COVID-19 Vaccine (4 - Booster for Moderna series) 03/18/2022       No flowsheet data found.

## 2023-01-12 NOTE — PROGRESS NOTES
[address] [place] [address]               [phone]      Sury Connor is a 56 y.o. female and presents with Follow Up Chronic Condition, Diabetes, Cholesterol Problem, and Hypertension       Assessment/Plan:    Diagnoses and all orders for this visit:    1. Type 2 diabetes mellitus without complication, without long-term current use of insulin (HCC)  -     metFORMIN ER (GLUCOPHAGE XR) 500 mg tablet; TAKE 1 TABLET BY MOUTH EVERY DAY WITH DINNER  -     HEMOGLOBIN A1C WITH EAG; Future  Endorses medication compliance, Refill provided, Follow up labs prior to next visit, and Diabetes controlled, A1C <7   2. Essential hypertension with goal blood pressure less than 104/49  -     METABOLIC PANEL, COMPREHENSIVE; Future  Endorses medication compliance, Follow up labs prior to next visit, and Blood pressure stable, continue losartan and hctz at same dose    3. Mixed hyperlipidemia  -     LIPID PANEL; Future  Endorses medication compliance, Follow up labs prior to next visit, and Denies abdominal pain or symptoms of myalgia   4. Vaginal odor  -     AMB POC URINALYSIS DIP STICK AUTO W/O MICRO  POC UA negative for UTI  5. Mild intermittent asthma, unspecified whether complicated  -     fluticasone-Salmeterol (Advair HFA) 45-21 mcg/actuation inhaler; Take 2 Puffs by inhalation two (2) times a day. -     albuterol (ProAir HFA) 90 mcg/actuation inhaler; INHALE 2 PUFFS EVERY 6 HOURS AS NEEDED FOR WHEEZE  -     montelukast (SINGULAIR) 10 mg tablet; Take 1 Tablet by mouth daily. 6. Other diabetic neurological complication associated with type 2 diabetes mellitus ([facility] [address]  Assessment & Plan:   well controlled, continue current medications      7. Screen for STD (sexually transmitted disease)  -     CT/NG/T.VAGINALIS AMPLIFICATION; Future  States she has had sexual intercourse with a new partner, wishes to have std testing  8.  Acute right-sided low back pain with right-sided sciatica  - cyclobenzaprine (FLEXERIL) 5 mg tablet; Take 1-2 Tablets by mouth three (3) times daily as needed for Muscle Spasm(s). -     traMADoL (ULTRAM) 50 mg tablet; Take 1 Tablet by mouth every six (6) hours as needed for Pain for up to 3 days. Max Daily Amount: 200 mg. Most likely pulled a muscle moving her couch, Rx provided, advised rest and heat or ice for comfort  Patient verbalized understanding and is in agreement with this plan of care    Follow-up and Dispositions    Return in about 4 months (around 5/12/2023) for DM, HTN, HLD, 15 min, office only, w/labs prior. Health Maintenance:   Health Maintenance   Topic Date Due    Pneumococcal 0-64 years (1 - PCV) Never done    Eye Exam Retinal or Dilated  Never done    Hepatitis B Vaccine (1 of 3 - Risk 3-dose series) Never done    Cervical cancer screen  06/22/2020    DTaP/Tdap/Td series (2 - Td or Tdap) 11/19/2020    Foot Exam Q1  08/24/2021    Shingles Vaccine (2 of 2) 03/18/2022    COVID-19 Vaccine (4 - Booster for Jeanette Lied series) 03/18/2022    Depression Monitoring  04/26/2023    Medicare Yearly Exam  08/27/2023    Breast Cancer Screen Mammogram  12/06/2023    Diabetic Alb to Cr ratio (uACR) test  12/28/2023    GFR test (Diabetes, CKD 3-4, OR last GFR 15-59)  12/28/2023    A1C test (Diabetic or Prediabetic)  12/28/2023    Lipid Screen  12/28/2023    Colorectal Cancer Screening Combo  06/08/2026    Hepatitis C Screening  Completed    Flu Vaccine  Completed        Subjective:    Labs obtained prior to visit? YES  Reviewed with patient? Yes    DMII-   Patient reports medication compliance Yes  Diabetic diet compliance most of the time  Patient monitors blood sugars regularly does not have a glucometer   Home glucose readings: does not check   Denies hypoglycemic episodes yes  Denies polyuria, polydipsia, paraesthesia, vision changes? yes  Engaging in daily exercise?  Yes: Comment: keeps herself busy and walks around her apartment building     Diabetic Foot and Eye Exam HM Status   Topic Date Due    Eye Exam  Never done    Diabetic Foot Care  08/24/2021     Hemoglobin A1c   Date Value Ref Range Status   12/28/2022 6.7 (H) 4.2 - 5.6 % Final     Comment:     (NOTE)  HbA1C Interpretive Ranges  <5.7              Normal  5.7 - 6.4         Consider Prediabetes  >6.5              Consider Diabetes       Hemoglobin A1c, External   Date Value Ref Range Status   01/08/2020 8.7  Final     Comment:     ce   ]  Creatinine, urine random   Date Value Ref Range Status   12/28/2022 511.00 (H) 30 - 125 mg/dL Final     Microalbumin/Creat ratio (mg/g creat)   Date Value Ref Range Status   12/28/2022 4 0 - 30 mg/g Final     Microalb/Creat ratio (ug/mg creat.)   Date Value Ref Range Status   04/26/2022   Final     Comment:     ** Unable to calculate Microablumin/Creatinine Ratio due to low Microalbumin       03/16/2021 24.6 0.0 - 30.0 Final     Key Antihyperglycemic Medications               metFORMIN ER (GLUCOPHAGE XR) 500 mg tablet (Taking) TAKE 1 TABLET BY MOUTH EVERY DAY WITH DINNER        Hypertension:  Visit Vitals  /62 Comment: feet flat on floor   Pulse 91   Temp 98 °F (36.7 °C) (Temporal)   Resp 20   Ht 5' 5\" (1.651 m)   Wt 223 lb (101.2 kg)   SpO2 98%   BMI 37.11 kg/m²      Patient reports taking medications as instructed. yes   Medication side effects noted. no  Headache upon wakening. no   Home BP monitoring: Does not check  Do you experience chest pain/pressure or SOB with exertion? no  Maintain a low Sodium diet? yes  Key CAD CHF Meds               pravastatin (PRAVACHOL) 40 mg tablet (Taking) TAKE 1 TABLET BY MOUTH EVERY DAY AT NIGHT    hydroCHLOROthiazide (HYDRODIURIL) 25 mg tablet (Taking) Take 1 Tablet by mouth daily.     losartan (COZAAR) 25 mg tablet (Taking) TAKE 1 TABLET BY MOUTH EVERY DAY          BUN   Date Value Ref Range Status   12/28/2022 13 7.0 - 18 MG/DL Final     Creatinine   Date Value Ref Range Status   12/28/2022 1.05 0.6 - 1.3 MG/DL Final     GFR est AA   Date Value Ref Range Status   07/09/2020 >60 >60 ml/min/1.73m2 Final     Potassium   Date Value Ref Range Status   12/28/2022 3.6 3.5 - 5.5 mmol/L Final       HLD:  Has been compliant with meds  Yes  Compliant with low-fat diet. most of the time    Denies myalgias or other side effects. yes  The 10-year ASCVD risk score (Yaw MONTIEL, et al., 2019) is: 12.4%    Cholesterol, total   Date Value Ref Range Status   12/28/2022 157 <200 MG/DL Final     Triglyceride   Date Value Ref Range Status   12/28/2022 109 <150 MG/DL Final     Comment:     The drugs N-acetylcysteine (NAC) and  Metamiszole have been found to cause falsely  low results in this chemical assay. Please  be sure to submit blood samples obtained  BEFORE administration of either of these  drugs to assure correct results. HDL Cholesterol   Date Value Ref Range Status   12/28/2022 41 40 - 60 MG/DL Final     LDL, calculated   Date Value Ref Range Status   12/28/2022 94.2 0 - 100 MG/DL Final   ]  Key Antihyperlipidemia Meds               pravastatin (PRAVACHOL) 40 mg tablet (Taking) TAKE 1 TABLET BY MOUTH EVERY DAY AT NIGHT           Low back pain  Right side acute pain  Has noticed a slight vaginal odor  Denies pain or irritation with urination, pelvic pain/discomfort  States she tried to move her sofa to straighten out the rug and then the back pain started  Recently had sexual intercourse with a new partner      ROS:     ROS  As stated in HPI, otherwise all others negative. The problem list was updated as a part of today's visit. Patient Active Problem List   Diagnosis Code    Syphilis in female A53.9    Menopause Z78.0    Eczema L30.9    Anxiety and depression F41.9, F32. A    Mild intermittent asthma J45.20    Severe obesity (HCC) E66.01    Essential hypertension with goal blood pressure less than 130/80 I10    Hyperlipidemia E78.5    Non-seasonal allergic rhinitis J30.89    Type 2 diabetes mellitus without complication, without long-term current use of insulin ([facility]) E11.9    Chronic right shoulder pain M25.511, G89.29    Nicotine dependence, uncomplicated C22.549    Meralgia paresthetica of left side G57.12    Other diabetic neurological complication associated with type 2 diabetes mellitus ([facility] [address] E11.49       The PSH, FH were reviewed. SH:  Social History     Tobacco Use    Smoking status: Every Day     Packs/day: 0.50     Years: 35.00     Pack years: 17.50     Types: Cigarettes    Smokeless tobacco: Never    Tobacco comments:     quit in january, smokes one occ   Vaping Use    Vaping Use: Never used   Substance Use Topics    Alcohol use: Yes     Alcohol/week: 2.0 standard drinks     Types: 2 Shots of liquor per week    Drug use: Yes     Types: Cocaine       Medications/Allergies:  Current Outpatient Medications on File Prior to Visit   Medication Sig Dispense Refill    pravastatin (PRAVACHOL) 40 mg tablet TAKE 1 TABLET BY MOUTH EVERY DAY AT NIGHT 90 Tablet 0    traZODone (DESYREL) 150 mg tablet TAKE 1 TABLET BY MOUTH EVERY DAY AT NIGHT 90 Tablet 0    azelastine (ASTELIN) 137 mcg (0.1 %) nasal spray 1 SPRAY BY BOTH NOSTRILS ROUTE TWO (2) TIMES A DAY. USE IN EACH NOSTRIL AS DIRECTED 1 Each 4    diclofenac (VOLTAREN) 1 % gel Apply  to affected area four (4) times daily. Apply up to 4g to affected area up to 4 times daily. 5 Each 5    fluticasone propionate (FLONASE) 50 mcg/actuation nasal spray USE 2 SPRAYS IN EACH NOSTRIL ONCE DAILY 1 Each 11    buPROPion XL (WELLBUTRIN XL) 150 mg tablet TAKE 1 TABLET BY MOUTH EVERY DAY 90 Tablet 0    hydroCHLOROthiazide (HYDRODIURIL) 25 mg tablet Take 1 Tablet by mouth daily. 90 Tablet 3    losartan (COZAAR) 25 mg tablet TAKE 1 TABLET BY MOUTH EVERY DAY 90 Tablet 1    albuterol (PROVENTIL VENTOLIN) 2.5 mg /3 mL (0.083 %) nebu 3 mL by Nebulization route every four (4) hours as needed for Wheezing.  30 Nebule 1    diclofenac (VOLTAREN) 1 % gel APPLY 4 GRAMS TO AFFECTED AREA FOUR TIMES DAILY 100 g 11 ketoconazole (NIZORAL) 2 % topical cream Apply once daily to cover the affected and immediate surrounding area for 2 weeks 30 g 2    [DISCONTINUED] montelukast (SINGULAIR) 10 mg tablet TAKE 1 TABLET BY MOUTH EVERY DAY 90 Tablet 1    [DISCONTINUED] metFORMIN ER (GLUCOPHAGE XR) 500 mg tablet TAKE 1 TABLET BY MOUTH EVERY DAY WITH DINNER 30 Tablet 11    [DISCONTINUED] ProAir HFA 90 mcg/actuation inhaler INHALE 2 PUFFS EVERY 6 HOURS AS NEEDED FOR WHEEZE 25.5 Each 2    [DISCONTINUED] fluticasone-Salmeterol (Advair HFA) 45-21 mcg/actuation inhaler Take 2 Puffs by inhalation two (2) times a day. 12 g 5     No current facility-administered medications on file prior to visit. Allergies   Allergen Reactions    Aspirin Nausea and Vomiting    Dog Dander Swelling    Ibuprofen Unknown (comments)     She doesn't know exactly why she is not allowed to take it    Lisinopril Cough    Mold Itching    Pollen Extracts Itching    Prednisone Swelling       Objective:  Visit Vitals  /62 Comment: feet flat on floor   Pulse 91   Temp 98 °F (36.7 °C) (Temporal)   Resp 20   Ht 5' 5\" (1.651 m)   Wt 223 lb (101.2 kg)   SpO2 98%   BMI 37.11 kg/m²    Body mass index is 37.11 kg/m².     Physical assessment  Physical Exam      Labwork and Ancillary Studies:    CBC w/Diff  Lab Results   Component Value Date/Time    WBC 10.7 07/09/2020 08:49 AM    HGB 12.6 07/09/2020 08:49 AM    PLATELET 000 24/17/8795 08:49 AM         Basic Metabolic Profile  Lab Results   Component Value Date/Time    Sodium 140 12/28/2022 08:49 AM    Potassium 3.6 12/28/2022 08:49 AM    Chloride 102 12/28/2022 08:49 AM    CO2 34 (H) 12/28/2022 08:49 AM    Anion gap 4 12/28/2022 08:49 AM    Glucose 128 (H) 12/28/2022 08:49 AM    BUN 13 12/28/2022 08:49 AM    Creatinine 1.05 12/28/2022 08:49 AM    BUN/Creatinine ratio 12 12/28/2022 08:49 AM    GFR est AA >60 07/09/2020 08:49 AM    GFR est non-AA 59 (L) 07/09/2020 08:49 AM    Calcium 9.9 12/28/2022 08:49 AM Cholesterol  Lab Results   Component Value Date/Time    Cholesterol, total 157 12/28/2022 08:49 AM    HDL Cholesterol 41 12/28/2022 08:49 AM    LDL, calculated 94.2 12/28/2022 08:49 AM    Triglyceride 109 12/28/2022 08:49 AM    CHOL/HDL Ratio 3.8 12/28/2022 08:49 AM           I have discussed the diagnosis with the patient and the intended plan as seen in the above orders. The patient has received an After-Visit Summary and questions were answered concerning future plans. An After Visit Summary was printed and given to the patient. All diagnosis have been discussed with the patient and all of the patient's questions have been answered. Follow-up and Dispositions    Return in about 4 months (around 5/12/2023) for DM, HTN, HLD, 15 min, office only, w/labs prior. Diana Ness, [facility]  [address]  [id number]

## 2023-01-13 LAB
CHLAMYDIA AMPLIFIED URINE: NEGATIVE
GC AMPLIFIED URINE,919: NEGATIVE
TRICH NUCU, 17621: NEGATIVE

## 2023-01-29 DIAGNOSIS — F32.A ANXIETY AND DEPRESSION: ICD-10-CM

## 2023-01-29 DIAGNOSIS — I10 ESSENTIAL HYPERTENSION WITH GOAL BLOOD PRESSURE LESS THAN 130/80: ICD-10-CM

## 2023-01-29 DIAGNOSIS — F41.9 ANXIETY AND DEPRESSION: ICD-10-CM

## 2023-01-30 RX ORDER — LOSARTAN POTASSIUM 25 MG/1
TABLET ORAL
Qty: 90 TABLET | Refills: 1 | Status: SHIPPED | OUTPATIENT
Start: 2023-01-30

## 2023-01-30 RX ORDER — BUPROPION HYDROCHLORIDE 150 MG/1
TABLET ORAL
Qty: 90 TABLET | Refills: 0 | Status: SHIPPED | OUTPATIENT
Start: 2023-01-30

## 2023-01-31 DIAGNOSIS — Z12.31 VISIT FOR SCREENING MAMMOGRAM: Primary | ICD-10-CM

## 2023-02-04 DIAGNOSIS — Z12.31 VISIT FOR SCREENING MAMMOGRAM: Primary | ICD-10-CM

## 2023-02-06 DIAGNOSIS — Z12.31 VISIT FOR SCREENING MAMMOGRAM: Primary | ICD-10-CM

## 2023-02-07 DIAGNOSIS — Z12.31 VISIT FOR SCREENING MAMMOGRAM: Primary | ICD-10-CM

## 2023-02-10 NOTE — TELEPHONE ENCOUNTER
[address] [facility] Clinical Staff  Subject: Refill Request     QUESTIONS   Name of Medication? fluticasone-salmeterol (ADVAIR HFA) 45-21 MCG/ACT   inhaler   Patient-reported dosage and instructions? 2 per day   How many days do you have left? 1   Preferred Pharmacy? [id number]   Pharmacy phone number (if available)? [phone]   ---------------------------------------------------------------------------   --------------,   Name of Medication? montelukast (SINGULAIR) 10 MG tablet   Patient-reported dosage and instructions? one per day   How many days do you have left? 0   Preferred Pharmacy? [id number]   Pharmacy phone number (if available)? [phone]   ---------------------------------------------------------------------------   --------------   [place] Nip INFO   What is the best way for the office to contact you? OK to leave message on   voicemail   Preferred Call Back Phone Number? [phone]   ---------------------------------------------------------------------------   --------------   SCRIPT ANSWERS   Relationship to Patient?  Self

## 2023-04-03 DIAGNOSIS — E78.5 HYPERLIPIDEMIA, UNSPECIFIED: ICD-10-CM

## 2023-04-07 RX ORDER — PRAVASTATIN SODIUM 40 MG
40 TABLET ORAL NIGHTLY
Qty: 90 TABLET | Refills: 1 | Status: SHIPPED | OUTPATIENT
Start: 2023-04-07

## 2023-04-11 RX ORDER — PRAVASTATIN SODIUM 40 MG
TABLET ORAL
Qty: 90 TABLET | OUTPATIENT
Start: 2023-04-11

## 2023-04-28 PROBLEM — I10 ESSENTIAL HYPERTENSION WITH GOAL BLOOD PRESSURE LESS THAN 130/80: Status: ACTIVE | Noted: 2020-02-27

## 2023-04-28 PROBLEM — E11.9 TYPE 2 DIABETES MELLITUS WITHOUT COMPLICATION, WITHOUT LONG-TERM CURRENT USE OF INSULIN (HCC): Status: ACTIVE | Noted: 2020-02-27

## 2023-04-28 PROBLEM — E11.49 OTHER DIABETIC NEUROLOGICAL COMPLICATION ASSOCIATED WITH TYPE 2 DIABETES MELLITUS (HCC): Status: ACTIVE | Noted: 2023-01-12

## 2023-04-28 PROBLEM — E78.2 MIXED HYPERLIPIDEMIA: Status: ACTIVE | Noted: 2020-02-27

## 2023-05-03 DIAGNOSIS — E11.9 TYPE 2 DIABETES MELLITUS WITHOUT COMPLICATION, WITHOUT LONG-TERM CURRENT USE OF INSULIN (HCC): Primary | ICD-10-CM

## 2023-05-03 DIAGNOSIS — E78.2 MIXED HYPERLIPIDEMIA: ICD-10-CM

## 2023-05-03 DIAGNOSIS — I10 ESSENTIAL HYPERTENSION WITH GOAL BLOOD PRESSURE LESS THAN 130/80: ICD-10-CM

## 2023-05-12 ENCOUNTER — OFFICE VISIT (OUTPATIENT)
Facility: CLINIC | Age: 58
End: 2023-05-12
Payer: COMMERCIAL

## 2023-05-12 VITALS
RESPIRATION RATE: 18 BRPM | SYSTOLIC BLOOD PRESSURE: 109 MMHG | HEIGHT: 65 IN | OXYGEN SATURATION: 96 % | HEART RATE: 91 BPM | TEMPERATURE: 98.2 F | WEIGHT: 211 LBS | DIASTOLIC BLOOD PRESSURE: 74 MMHG | BODY MASS INDEX: 35.16 KG/M2

## 2023-05-12 DIAGNOSIS — E11.9 TYPE 2 DIABETES MELLITUS WITHOUT COMPLICATION, WITHOUT LONG-TERM CURRENT USE OF INSULIN (HCC): Primary | ICD-10-CM

## 2023-05-12 DIAGNOSIS — J45.20 MILD INTERMITTENT ASTHMA WITHOUT COMPLICATION: ICD-10-CM

## 2023-05-12 DIAGNOSIS — L30.8 OTHER ECZEMA: ICD-10-CM

## 2023-05-12 DIAGNOSIS — I10 ESSENTIAL HYPERTENSION WITH GOAL BLOOD PRESSURE LESS THAN 130/80: ICD-10-CM

## 2023-05-12 DIAGNOSIS — F41.9 ANXIETY AND DEPRESSION: ICD-10-CM

## 2023-05-12 DIAGNOSIS — M62.830 MUSCLE SPASM OF BACK: ICD-10-CM

## 2023-05-12 DIAGNOSIS — E78.2 MIXED HYPERLIPIDEMIA: ICD-10-CM

## 2023-05-12 DIAGNOSIS — F51.01 PRIMARY INSOMNIA: ICD-10-CM

## 2023-05-12 DIAGNOSIS — F32.A ANXIETY AND DEPRESSION: ICD-10-CM

## 2023-05-12 PROBLEM — E66.812 CLASS 2 SEVERE OBESITY DUE TO EXCESS CALORIES WITH SERIOUS COMORBIDITY AND BODY MASS INDEX (BMI) OF 37.0 TO 37.9 IN ADULT: Status: ACTIVE | Noted: 2019-11-18

## 2023-05-12 PROBLEM — E66.01 SEVERE OBESITY (HCC): Status: ACTIVE | Noted: 2019-11-18

## 2023-05-12 PROCEDURE — 3078F DIAST BP <80 MM HG: CPT | Performed by: NURSE PRACTITIONER

## 2023-05-12 PROCEDURE — 99214 OFFICE O/P EST MOD 30 MIN: CPT | Performed by: NURSE PRACTITIONER

## 2023-05-12 PROCEDURE — 3074F SYST BP LT 130 MM HG: CPT | Performed by: NURSE PRACTITIONER

## 2023-05-12 PROCEDURE — 3044F HG A1C LEVEL LT 7.0%: CPT | Performed by: NURSE PRACTITIONER

## 2023-05-12 RX ORDER — METFORMIN HYDROCHLORIDE 500 MG/1
TABLET, EXTENDED RELEASE ORAL
Qty: 90 TABLET | Refills: 1 | Status: SHIPPED | OUTPATIENT
Start: 2023-05-12

## 2023-05-12 RX ORDER — CYCLOBENZAPRINE HCL 5 MG
5-10 TABLET ORAL 3 TIMES DAILY PRN
Qty: 90 TABLET | Refills: 1 | Status: SHIPPED | OUTPATIENT
Start: 2023-05-12

## 2023-05-12 RX ORDER — KETOCONAZOLE 20 MG/G
CREAM TOPICAL 2 TIMES DAILY
Qty: 30 G | Refills: 1 | Status: SHIPPED | OUTPATIENT
Start: 2023-05-12

## 2023-05-12 RX ORDER — CYCLOBENZAPRINE HCL 5 MG
5-10 TABLET ORAL 3 TIMES DAILY PRN
COMMUNITY
Start: 2023-01-12 | End: 2023-05-12 | Stop reason: SDUPTHER

## 2023-05-12 RX ORDER — HYDROCHLOROTHIAZIDE 25 MG/1
25 TABLET ORAL DAILY
Qty: 90 TABLET | Refills: 1 | Status: SHIPPED | OUTPATIENT
Start: 2023-05-12

## 2023-05-12 RX ORDER — TRAZODONE HYDROCHLORIDE 150 MG/1
150 TABLET ORAL NIGHTLY
Qty: 90 TABLET | Refills: 1 | Status: SHIPPED | OUTPATIENT
Start: 2023-05-12

## 2023-05-12 RX ORDER — BUPROPION HYDROCHLORIDE 150 MG/1
150 TABLET ORAL DAILY
Qty: 90 TABLET | Refills: 1 | Status: SHIPPED | OUTPATIENT
Start: 2023-05-12

## 2023-05-12 ASSESSMENT — PATIENT HEALTH QUESTIONNAIRE - PHQ9
SUM OF ALL RESPONSES TO PHQ9 QUESTIONS 1 & 2: 2
2. FEELING DOWN, DEPRESSED OR HOPELESS: 1
SUM OF ALL RESPONSES TO PHQ QUESTIONS 1-9: 6
4. FEELING TIRED OR HAVING LITTLE ENERGY: 2
5. POOR APPETITE OR OVEREATING: 1
SUM OF ALL RESPONSES TO PHQ QUESTIONS 1-9: 6
10. IF YOU CHECKED OFF ANY PROBLEMS, HOW DIFFICULT HAVE THESE PROBLEMS MADE IT FOR YOU TO DO YOUR WORK, TAKE CARE OF THINGS AT HOME, OR GET ALONG WITH OTHER PEOPLE: 0
SUM OF ALL RESPONSES TO PHQ QUESTIONS 1-9: 6
9. THOUGHTS THAT YOU WOULD BE BETTER OFF DEAD, OR OF HURTING YOURSELF: 0
3. TROUBLE FALLING OR STAYING ASLEEP: 1
1. LITTLE INTEREST OR PLEASURE IN DOING THINGS: 1
7. TROUBLE CONCENTRATING ON THINGS, SUCH AS READING THE NEWSPAPER OR WATCHING TELEVISION: 0
6. FEELING BAD ABOUT YOURSELF - OR THAT YOU ARE A FAILURE OR HAVE LET YOURSELF OR YOUR FAMILY DOWN: 0
SUM OF ALL RESPONSES TO PHQ QUESTIONS 1-9: 6
8. MOVING OR SPEAKING SO SLOWLY THAT OTHER PEOPLE COULD HAVE NOTICED. OR THE OPPOSITE, BEING SO FIGETY OR RESTLESS THAT YOU HAVE BEEN MOVING AROUND A LOT MORE THAN USUAL: 0

## 2023-05-12 NOTE — PROGRESS NOTES
Room 9    When asked if patient has any concerns she would like to address with MARLIN Lea patient states yes medication for pain . Did patient bring someone? No     Did the patient have DME equipment? No     Did you take your medication today? Yes      1. \"Have you been to the ER, urgent care clinic since your last visit? Hospitalized since your last visit? \" No     2. \"Have you seen or consulted any other health care providers outside of the [address] [place] [address] since your last visit? \" No     3. For patients aged 39-70: Has the patient had a colonoscopy / FIT/ Cologuard? Yes      If the patient is female:    4. For patients aged 41-77: Has the patient had a mammogram within the past 2 years? Yes      5. For patients aged 21-65: Has the patient had a pap smear? {Cancer Care Gap present? Patient state I will schedule an appointment . PHQ-9  5/12/2023   Little interest or pleasure in doing things 1   Little interest or pleasure in doing things -   Feeling down, depressed, or hopeless 1   Trouble falling or staying asleep, or sleeping too much 1   Trouble falling or staying asleep, or sleeping too much -   Feeling tired or having little energy 2   Feeling tired or having little energy -   Poor appetite or overeating 1   Poor appetite, weight loss, or overeating -   Feeling bad about yourself - or that you are a failure or have let yourself or your family down 0   Feeling bad about yourself - or that you are a failure or have let yourself or your family down -   Trouble concentrating on things, such as reading the newspaper or watching television 0   Trouble concentrating on things such as school, work, reading, or watching TV -   Moving or speaking so slowly that other people could have noticed.  Or the opposite - being so fidgety or restless that you have been moving around a lot more than usual 0   Moving or speaking so slowly that other people could have noticed; or the opposite being so fidgety
Visit   Medication Sig Dispense Refill    fluticasone-salmeterol (ADVAIR HFA) 45-21 MCG/ACT inhaler Inhale 2 puffs into the lungs 2 times daily 1 each 3    montelukast (SINGULAIR) 10 MG tablet Take 1 tablet by mouth daily 90 tablet 3    pravastatin (PRAVACHOL) 40 MG tablet Take 1 tablet by mouth nightly 90 tablet 1    albuterol (PROVENTIL) (2.5 MG/3ML) 0.083% nebulizer solution Inhale 3 mLs into the lungs every 4 hours as needed      albuterol sulfate HFA (PROVENTIL;VENTOLIN;PROAIR) 108 (90 Base) MCG/ACT inhaler INHALE 2 PUFFS EVERY 6 HOURS AS NEEDED FOR WHEEZE      azelastine (ASTELIN) 0.1 % nasal spray 1 spray by Nasal route 2 times daily      buPROPion (WELLBUTRIN XL) 150 MG extended release tablet TAKE 1 TABLET BY MOUTH EVERY DAY      diclofenac sodium (VOLTAREN) 1 % GEL Apply topically 4 times daily      fluticasone (FLONASE) 50 MCG/ACT nasal spray USE 2 SPRAYS IN EACH NOSTRIL ONCE DAILY      hydroCHLOROthiazide (HYDRODIURIL) 25 MG tablet Take 1 tablet by mouth daily      ketoconazole (NIZORAL) 2 % cream Apply once daily to cover the affected and immediate surrounding area for 2 weeks      losartan (COZAAR) 25 MG tablet TAKE 1 TABLET BY MOUTH EVERY DAY      metFORMIN (GLUCOPHAGE-XR) 500 MG extended release tablet TAKE 1 TABLET BY MOUTH EVERY DAY WITH DINNER      traZODone (DESYREL) 150 MG tablet TAKE 1 TABLET BY MOUTH EVERY DAY AT NIGHT       No current facility-administered medications on file prior to visit. Allergies   Allergen Reactions    Dog Epithelium Allergy Skin Test Swelling    Ibuprofen      Other reaction(s): Unknown (comments)  She doesn't know exactly why she is not allowed to take it    Lisinopril Cough    Molds & Smuts Itching    Pollen Extract Itching    Prednisone Swelling    Aspirin Nausea And Vomiting       Objective: There were no vitals taken for this visit. There is no height or weight on file to calculate BMI.     Physical assessment  Physical Exam  Vitals and nursing note

## 2023-05-23 ENCOUNTER — TELEPHONE (OUTPATIENT)
Facility: CLINIC | Age: 58
End: 2023-05-23

## 2023-06-09 DIAGNOSIS — R20.0 ANESTHESIA OF SKIN: ICD-10-CM

## 2023-06-09 DIAGNOSIS — M25.511 PAIN IN RIGHT SHOULDER: ICD-10-CM

## 2023-07-11 ENCOUNTER — OFFICE VISIT (OUTPATIENT)
Facility: CLINIC | Age: 58
End: 2023-07-11
Payer: COMMERCIAL

## 2023-07-11 VITALS
WEIGHT: 210 LBS | OXYGEN SATURATION: 94 % | HEIGHT: 65 IN | BODY MASS INDEX: 34.99 KG/M2 | SYSTOLIC BLOOD PRESSURE: 105 MMHG | HEART RATE: 85 BPM | TEMPERATURE: 97 F | DIASTOLIC BLOOD PRESSURE: 68 MMHG | RESPIRATION RATE: 18 BRPM

## 2023-07-11 DIAGNOSIS — M25.562 CHRONIC PAIN OF BOTH KNEES: ICD-10-CM

## 2023-07-11 DIAGNOSIS — M25.511 CHRONIC RIGHT SHOULDER PAIN: Primary | ICD-10-CM

## 2023-07-11 DIAGNOSIS — G89.29 CHRONIC RIGHT SHOULDER PAIN: Primary | ICD-10-CM

## 2023-07-11 DIAGNOSIS — M79.641 PAIN IN BOTH HANDS: ICD-10-CM

## 2023-07-11 DIAGNOSIS — M25.561 CHRONIC PAIN OF BOTH KNEES: ICD-10-CM

## 2023-07-11 DIAGNOSIS — G89.29 CHRONIC PAIN OF BOTH KNEES: ICD-10-CM

## 2023-07-11 DIAGNOSIS — M79.675 GREAT TOE PAIN, LEFT: ICD-10-CM

## 2023-07-11 DIAGNOSIS — M79.642 PAIN IN BOTH HANDS: ICD-10-CM

## 2023-07-11 PROCEDURE — 99213 OFFICE O/P EST LOW 20 MIN: CPT | Performed by: NURSE PRACTITIONER

## 2023-07-11 PROCEDURE — 3078F DIAST BP <80 MM HG: CPT | Performed by: NURSE PRACTITIONER

## 2023-07-11 PROCEDURE — 3074F SYST BP LT 130 MM HG: CPT | Performed by: NURSE PRACTITIONER

## 2023-07-11 SDOH — ECONOMIC STABILITY: INCOME INSECURITY: HOW HARD IS IT FOR YOU TO PAY FOR THE VERY BASICS LIKE FOOD, HOUSING, MEDICAL CARE, AND HEATING?: NOT HARD AT ALL

## 2023-07-11 SDOH — ECONOMIC STABILITY: FOOD INSECURITY: WITHIN THE PAST 12 MONTHS, YOU WORRIED THAT YOUR FOOD WOULD RUN OUT BEFORE YOU GOT MONEY TO BUY MORE.: NEVER TRUE

## 2023-07-11 SDOH — ECONOMIC STABILITY: FOOD INSECURITY: WITHIN THE PAST 12 MONTHS, THE FOOD YOU BOUGHT JUST DIDN'T LAST AND YOU DIDN'T HAVE MONEY TO GET MORE.: NEVER TRUE

## 2023-07-11 SDOH — ECONOMIC STABILITY: HOUSING INSECURITY
IN THE LAST 12 MONTHS, WAS THERE A TIME WHEN YOU DID NOT HAVE A STEADY PLACE TO SLEEP OR SLEPT IN A SHELTER (INCLUDING NOW)?: NO

## 2023-07-11 ASSESSMENT — PATIENT HEALTH QUESTIONNAIRE - PHQ9
SUM OF ALL RESPONSES TO PHQ QUESTIONS 1-9: 8
1. LITTLE INTEREST OR PLEASURE IN DOING THINGS: 1
8. MOVING OR SPEAKING SO SLOWLY THAT OTHER PEOPLE COULD HAVE NOTICED. OR THE OPPOSITE, BEING SO FIGETY OR RESTLESS THAT YOU HAVE BEEN MOVING AROUND A LOT MORE THAN USUAL: 1
5. POOR APPETITE OR OVEREATING: 1
SUM OF ALL RESPONSES TO PHQ QUESTIONS 1-9: 8
2. FEELING DOWN, DEPRESSED OR HOPELESS: 1
4. FEELING TIRED OR HAVING LITTLE ENERGY: 1
3. TROUBLE FALLING OR STAYING ASLEEP: 1
6. FEELING BAD ABOUT YOURSELF - OR THAT YOU ARE A FAILURE OR HAVE LET YOURSELF OR YOUR FAMILY DOWN: 1
SUM OF ALL RESPONSES TO PHQ QUESTIONS 1-9: 8
10. IF YOU CHECKED OFF ANY PROBLEMS, HOW DIFFICULT HAVE THESE PROBLEMS MADE IT FOR YOU TO DO YOUR WORK, TAKE CARE OF THINGS AT HOME, OR GET ALONG WITH OTHER PEOPLE: 0
7. TROUBLE CONCENTRATING ON THINGS, SUCH AS READING THE NEWSPAPER OR WATCHING TELEVISION: 1
9. THOUGHTS THAT YOU WOULD BE BETTER OFF DEAD, OR OF HURTING YOURSELF: 0
SUM OF ALL RESPONSES TO PHQ9 QUESTIONS 1 & 2: 2
SUM OF ALL RESPONSES TO PHQ QUESTIONS 1-9: 8

## 2023-07-11 NOTE — PROGRESS NOTES
Room 8     Estrella had concerns including Pain (Body pain ). for today's visit . When asked if patient has any concerns she would like to address with MARLIN Basilio . Patient states I have pain in both hips, both feet, right thigh and my right ankle swells really bad . Patient states I am having chronic back pain, numbness in both hands and both shoulder pain . MARLIN Basilio has been notified  of patient concerns . Patient states I need a nurse to help me get out of bed and just to help me around the house . Sometimes I I can not get out bed . Did patient bring someone? No     Did the patient have DME equipment? Yes Rolator     Did you take your medication today? Yes       1. \"Have you been to the ER, urgent care clinic since your last visit? Hospitalized since your last visit? \" . NO    2. \"Have you seen or consulted any other health care providers outside of the [address] [place] [address] since your last visit? \"No     3. For patients aged 43-73: Has the patient had a colonoscopy / FIT/ Cologuard? Yes      If the patient is female:    4. For patients aged 43-66: Has the patient had a mammogram within the past 2 years? Yes       5. For patients aged 21-65: Has the patient had a pap smear? {Cancer Care Gap present? Yes         Amb Fall Risk Assessment and TUG Test 3/16/2021   Fall in past 12 months? 0   Able to walk?  Yes          PHQ-9  7/11/2023   Little interest or pleasure in doing things 1   Little interest or pleasure in doing things -   Feeling down, depressed, or hopeless 1   Trouble falling or staying asleep, or sleeping too much 1   Trouble falling or staying asleep, or sleeping too much -   Feeling tired or having little energy 1   Feeling tired or having little energy -   Poor appetite or overeating 1   Poor appetite, weight loss, or overeating -   Feeling bad about yourself - or that you are a failure or have let yourself or your family down 1   Feeling bad about yourself - or that you

## 2023-07-11 NOTE — PROGRESS NOTES
9 [facility], [address] [place] [address]               [phone]      Carmen Wahl is a 56 y.o. female and presents with Pain (Body pain )       Assessment/Plan:    1. Chronic right shoulder pain  2. Pain in both hands  3. Chronic pain of both knees  4. Great toe pain, left  -     External Referral To Podiatry  She wishes to get home nurses aid for her all over pain and immobility, currently ambulating with a walker  She has had a  come to her house, I have called Mrs. Staples and [facility] to call me back to see what I need to do on my part to assist patient with this. States toenail on left great toe causes pain at times  Refer to podiatry as she is a diabetic    Patient verbalized understanding and is in agreement with this plan of care       Follow up and disposition:   Return for keep previously scheduled follow up. Subjective:    Labs obtained prior to visit? No  Reviewed with patient? N/A    Pain  Has previously discussed this all over pain with Dr. Candace Vazquez. Per his last note of 10/28/22:  Neck pain that radiates down her arm, down to her fingers  Shoulder pain with overhead activities and at night  Left wrist pain and hand pain, numbness and tingling. The pain is so bad she can hardly put her glasses on in the morning.   Bilateral knee pain for several months,   Pain with standing, walking and stair climbing  [place] pain after sitting  Euflexxa did not help  Bilateral hand pain L>R  Leg numbness and tingling    All these problems continue today  States her left hand and wrist is weak  She has been off balance and light headed  She has pain in her neck and down her back along her spine  She has previously been ordered a MRI of her neck but it was not approved  She has been in pain management in the past but her insurance would no longer cover it  She missed her last appointment with Dr. Candace Vazquez due to ride problems    Toe pain  Left foot great toe  Sometimes

## 2023-07-26 DIAGNOSIS — J45.20 MILD INTERMITTENT ASTHMA, UNCOMPLICATED: ICD-10-CM

## 2023-07-27 RX ORDER — ALBUTEROL SULFATE 90 UG/1
AEROSOL, METERED RESPIRATORY (INHALATION)
Qty: 18 G | Refills: 1 | Status: SHIPPED | OUTPATIENT
Start: 2023-07-27

## 2023-07-28 RX ORDER — ALBUTEROL SULFATE 90 UG/1
AEROSOL, METERED RESPIRATORY (INHALATION)
Qty: 25.5 EACH | Refills: 2 | OUTPATIENT
Start: 2023-07-28

## 2023-08-24 ENCOUNTER — OFFICE VISIT (OUTPATIENT)
Age: 58
End: 2023-08-24

## 2023-08-24 VITALS — BODY MASS INDEX: 34.66 KG/M2 | HEIGHT: 65 IN | TEMPERATURE: 97.1 F | WEIGHT: 208 LBS

## 2023-08-24 DIAGNOSIS — R20.0 NUMBNESS AND TINGLING OF RIGHT UPPER EXTREMITY: ICD-10-CM

## 2023-08-24 DIAGNOSIS — R20.0 NUMBNESS AND TINGLING OF LEFT UPPER EXTREMITY: ICD-10-CM

## 2023-08-24 DIAGNOSIS — M54.50 CHRONIC LOW BACK PAIN, UNSPECIFIED BACK PAIN LATERALITY, UNSPECIFIED WHETHER SCIATICA PRESENT: ICD-10-CM

## 2023-08-24 DIAGNOSIS — R20.2 NUMBNESS AND TINGLING OF RIGHT UPPER EXTREMITY: ICD-10-CM

## 2023-08-24 DIAGNOSIS — G89.29 CHRONIC LOW BACK PAIN, UNSPECIFIED BACK PAIN LATERALITY, UNSPECIFIED WHETHER SCIATICA PRESENT: ICD-10-CM

## 2023-08-24 DIAGNOSIS — M70.61 TROCHANTERIC BURSITIS OF RIGHT HIP: ICD-10-CM

## 2023-08-24 DIAGNOSIS — R20.2 NUMBNESS AND TINGLING OF LEFT UPPER EXTREMITY: ICD-10-CM

## 2023-08-24 DIAGNOSIS — M25.551 HIP PAIN, RIGHT: Primary | ICD-10-CM

## 2023-08-24 DIAGNOSIS — M54.12 RADICULOPATHY, CERVICAL REGION: ICD-10-CM

## 2023-08-24 RX ORDER — BETAMETHASONE SODIUM PHOSPHATE AND BETAMETHASONE ACETATE 3; 3 MG/ML; MG/ML
3 INJECTION, SUSPENSION INTRA-ARTICULAR; INTRALESIONAL; INTRAMUSCULAR; SOFT TISSUE ONCE
Status: COMPLETED | OUTPATIENT
Start: 2023-08-24 | End: 2023-08-24

## 2023-08-24 RX ADMIN — BETAMETHASONE SODIUM PHOSPHATE AND BETAMETHASONE ACETATE 3 MG: 3; 3 INJECTION, SUSPENSION INTRA-ARTICULAR; INTRALESIONAL; INTRAMUSCULAR; SOFT TISSUE at 10:20
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
## 2023-09-25 ENCOUNTER — OFFICE VISIT (OUTPATIENT)
Age: 58
End: 2023-09-25
Payer: MEDICARE

## 2023-09-25 VITALS — BODY MASS INDEX: 34.66 KG/M2 | HEIGHT: 65 IN | TEMPERATURE: 97 F | WEIGHT: 208 LBS

## 2023-09-25 DIAGNOSIS — Z98.890 S/P LEFT ROTATOR CUFF REPAIR: ICD-10-CM

## 2023-09-25 DIAGNOSIS — M76.60 ACHILLES TENDINITIS, UNSPECIFIED LATERALITY: Primary | ICD-10-CM

## 2023-09-25 DIAGNOSIS — M19.012 PRIMARY OSTEOARTHRITIS OF LEFT SHOULDER: ICD-10-CM

## 2023-09-25 DIAGNOSIS — M75.52 BURSITIS OF LEFT SHOULDER: ICD-10-CM

## 2023-09-25 DIAGNOSIS — M25.512 LEFT SHOULDER PAIN, UNSPECIFIED CHRONICITY: ICD-10-CM

## 2023-09-25 DIAGNOSIS — M85.80 OSTEOPENIA, UNSPECIFIED LOCATION: ICD-10-CM

## 2023-09-25 PROCEDURE — 99213 OFFICE O/P EST LOW 20 MIN: CPT | Performed by: SPECIALIST

## 2023-09-25 PROCEDURE — 4004F PT TOBACCO SCREEN RCVD TLK: CPT | Performed by: SPECIALIST

## 2023-09-25 PROCEDURE — 3017F COLORECTAL CA SCREEN DOC REV: CPT | Performed by: SPECIALIST

## 2023-09-25 PROCEDURE — G8427 DOCREV CUR MEDS BY ELIG CLIN: HCPCS | Performed by: SPECIALIST

## 2023-09-25 PROCEDURE — G8417 CALC BMI ABV UP PARAM F/U: HCPCS | Performed by: SPECIALIST

## 2023-09-25 PROCEDURE — 73030 X-RAY EXAM OF SHOULDER: CPT | Performed by: SPECIALIST

## 2023-09-25 PROCEDURE — 20610 DRAIN/INJ JOINT/BURSA W/O US: CPT | Performed by: SPECIALIST

## 2023-09-25 RX ORDER — BETAMETHASONE SODIUM PHOSPHATE AND BETAMETHASONE ACETATE 3; 3 MG/ML; MG/ML
3 INJECTION, SUSPENSION INTRA-ARTICULAR; INTRALESIONAL; INTRAMUSCULAR; SOFT TISSUE ONCE
Status: COMPLETED | OUTPATIENT
Start: 2023-09-25 | End: 2023-09-25

## 2023-09-25 RX ADMIN — BETAMETHASONE SODIUM PHOSPHATE AND BETAMETHASONE ACETATE 3 MG: 3; 3 INJECTION, SUSPENSION INTRA-ARTICULAR; INTRALESIONAL; INTRAMUSCULAR; SOFT TISSUE at 10:46

## 2023-09-25 NOTE — PROGRESS NOTES
err
or dislocation.   -I have independently reviewed these images during this office visit. -Dr. Mario Montes:  Three views - No fractures, no acromioclavicular narrowing, no glenohumeral narrowing, no calcific densities, prominence of greater tuberosity     XR RIGHT FOREARM 5/2/19 [facility] ED  Impression:  1. No evidence of fracture or dislocation.   -I have independently reviewed these images during this office visit. -Dr. Mario Montes:  Two views - No fracture, ossific density adjacent to medial epicondyle    PROCEDURE: Left shoulder injection  Chart reviewed for the following:   Debo Granados MD, have reviewed the History, Physical and updated the Allergic reactions for [address] [place] [address] performed immediately prior to start of procedure:  Debo Granados MD, have performed the following reviews on [facility] prior to the start of the procedure:            * Patient was identified by name and date of birth   * Agreement on procedure being performed was verified  * Risks and Benefits explained to the patient  * Procedure site verified and marked as necessary  * Patient was positioned for comfort  * Consent was obtained  Time: 10:54 AM  Date of procedure: 9/25/2023    Procedure performed by:  Dr. Ishmael Spence    Ms. Sabillon tolerated the procedure well with no complications. IMPRESSION:      ICD-10-CM    1. Achilles tendinitis, unspecified laterality  M76.60       2. S/P left rotator cuff repair  Z98.890 betamethasone acetate-betamethasone sodium phosphate (CELESTONE) injection 3 mg     DRAIN/INJECT LARGE JOINT/BURSA      3. Bursitis of left shoulder  M75.52 betamethasone acetate-betamethasone sodium phosphate (CELESTONE) injection 3 mg     DRAIN/INJECT LARGE JOINT/BURSA      4.  Left shoulder pain, unspecified chronicity  M25.512 betamethasone acetate-betamethasone sodium phosphate (CELESTONE) injection 3 mg     DRAIN/INJECT LARGE JOINT/BURSA     [40587]

## 2023-10-23 ENCOUNTER — TELEPHONE (OUTPATIENT)
Dept: PHARMACY | Facility: CLINIC | Age: 58
End: 2023-10-23

## 2023-10-23 NOTE — TELEPHONE ENCOUNTER
last picked up on 08.09.23 for 90 day supply. Billed through [facility] [place] ([facility]). 0 refills remaining. Lab Results   Component Value Date    CHOL 130 05/05/2023    TRIG 111 05/05/2023    HDL 44 05/05/2023    LDLCALC 64 05/05/2023     ALT   Date Value Ref Range Status   05/05/2023 9 5 - 40 U/L Final     AST   Date Value Ref Range Status   05/05/2023 14 10 - 37 U/L Final     The 10-year ASCVD risk score (Jason TRAN, et al., 2019) is: 9.5%    Values used to calculate the score:      Age: 56 years      Sex: Female      Is Non- : Yes      Diabetic: Yes      Tobacco smoker: Yes      Systolic Blood Pressure: 225 mmHg      Is BP treated: Yes      HDL Cholesterol: 44 mg/dL      Total Cholesterol: 130 mg/dL     PLAN    Per insurer report, LIS-2 - may be able to receive up to a 100-day supply for the same cost as a 30-day supply    The following are interventions that have been identified:   Patient overdue refilling LOSARTAN POT TAB 25 MG and active on home medication list.   Refill/s of LOSARTAN POT TAB 25 MG READY to  at patient's [facility] pharmacy for a $0 co pay. Reached patient to review. Spoke to [place] who states she takes LOSARTAN POT TAB 25 MG, 1 tablet daily. She is doing well on the medication and isn't experiencing any issues or side effects. She said this is one of the first medications she was placed on and has been on it for a long time. She said she probably is past due but she uses [facility] and would appreciate a refill. She said she has about a week supply left. I informed her that [facility] is refilling a 90 day supply and it will be ready later this afternoon to . She was appreciative and said she will go pick it up.        Verified medication instructions     Last Visit: 07.11.23  Next Visit: 11.29.23    Isabella Marcum, [address]   [phone] option 2     For Pharmacy Admin Tracking Only    Program: [facility]

## 2023-10-26 RX ORDER — PRAVASTATIN SODIUM 40 MG
40 TABLET ORAL NIGHTLY
Qty: 90 TABLET | Refills: 1 | OUTPATIENT
Start: 2023-10-26

## 2023-11-08 ENCOUNTER — PATIENT MESSAGE (OUTPATIENT)
Dept: OTHER | Facility: CLINIC | Age: 58
End: 2023-11-08

## 2023-11-13 DIAGNOSIS — E11.9 TYPE 2 DIABETES MELLITUS WITHOUT COMPLICATION, WITHOUT LONG-TERM CURRENT USE OF INSULIN (HCC): ICD-10-CM

## 2023-11-14 RX ORDER — METFORMIN HYDROCHLORIDE 500 MG/1
TABLET, EXTENDED RELEASE ORAL
Qty: 90 TABLET | Refills: 1 | OUTPATIENT
Start: 2023-11-14

## 2023-11-18 DIAGNOSIS — M62.830 MUSCLE SPASM OF BACK: ICD-10-CM

## 2023-11-20 DIAGNOSIS — E11.9 TYPE 2 DIABETES MELLITUS WITHOUT COMPLICATION, WITHOUT LONG-TERM CURRENT USE OF INSULIN (HCC): ICD-10-CM

## 2023-11-20 DIAGNOSIS — I10 ESSENTIAL HYPERTENSION WITH GOAL BLOOD PRESSURE LESS THAN 130/80: ICD-10-CM

## 2023-11-20 RX ORDER — HYDROCHLOROTHIAZIDE 25 MG/1
25 TABLET ORAL DAILY
Qty: 90 TABLET | Refills: 1 | OUTPATIENT
Start: 2023-11-20

## 2023-11-20 RX ORDER — LOSARTAN POTASSIUM 25 MG/1
25 TABLET ORAL DAILY
Qty: 30 TABLET | OUTPATIENT
Start: 2023-11-20

## 2023-11-20 RX ORDER — FLUTICASONE PROPIONATE 50 MCG
SPRAY, SUSPENSION (ML) NASAL
Qty: 16 G | OUTPATIENT
Start: 2023-11-20

## 2023-11-20 RX ORDER — PRAVASTATIN SODIUM 40 MG
40 TABLET ORAL NIGHTLY
Qty: 90 TABLET | Refills: 1 | OUTPATIENT
Start: 2023-11-20

## 2023-11-20 RX ORDER — FLUTICASONE PROPIONATE 50 MCG
2 SPRAY, SUSPENSION (ML) NASAL DAILY
Refills: 9 | OUTPATIENT
Start: 2023-11-20

## 2023-11-20 RX ORDER — METFORMIN HYDROCHLORIDE 500 MG/1
TABLET, EXTENDED RELEASE ORAL
Qty: 90 TABLET | Refills: 1 | OUTPATIENT
Start: 2023-11-20

## 2023-11-20 RX ORDER — CYCLOBENZAPRINE HCL 5 MG
5-10 TABLET ORAL 3 TIMES DAILY PRN
Qty: 90 TABLET | Refills: 1 | OUTPATIENT
Start: 2023-11-20

## 2023-11-27 DIAGNOSIS — E11.9 TYPE 2 DIABETES MELLITUS WITHOUT COMPLICATION, WITHOUT LONG-TERM CURRENT USE OF INSULIN (HCC): ICD-10-CM

## 2023-11-27 RX ORDER — METFORMIN HYDROCHLORIDE 500 MG/1
TABLET, EXTENDED RELEASE ORAL
Qty: 90 TABLET | Refills: 1 | OUTPATIENT
Start: 2023-11-27

## 2023-11-29 ENCOUNTER — OFFICE VISIT (OUTPATIENT)
Age: 58
End: 2023-11-29
Payer: MEDICARE

## 2023-11-29 DIAGNOSIS — Z98.890 H/O REPAIR OF LEFT ROTATOR CUFF: ICD-10-CM

## 2023-11-29 DIAGNOSIS — M19.012 PRIMARY OSTEOARTHRITIS OF LEFT SHOULDER: Primary | ICD-10-CM

## 2023-11-29 PROCEDURE — G8417 CALC BMI ABV UP PARAM F/U: HCPCS | Performed by: ORTHOPAEDIC SURGERY

## 2023-11-29 PROCEDURE — 99214 OFFICE O/P EST MOD 30 MIN: CPT | Performed by: ORTHOPAEDIC SURGERY

## 2023-11-29 PROCEDURE — 3017F COLORECTAL CA SCREEN DOC REV: CPT | Performed by: ORTHOPAEDIC SURGERY

## 2023-11-29 PROCEDURE — G8428 CUR MEDS NOT DOCUMENT: HCPCS | Performed by: ORTHOPAEDIC SURGERY

## 2023-11-29 PROCEDURE — G8484 FLU IMMUNIZE NO ADMIN: HCPCS | Performed by: ORTHOPAEDIC SURGERY

## 2023-11-29 PROCEDURE — 4004F PT TOBACCO SCREEN RCVD TLK: CPT | Performed by: ORTHOPAEDIC SURGERY

## 2023-11-29 NOTE — PATIENT INSTRUCTIONS
If we order a Diagnostic test (such as MRI or CT) during your office visit please see below:     Coordination of Care will be calling you to schedule your diagnostic test. If you have not heard from Coordination of Care within 3 business days, please call [phone]. Once you have a date scheduled for your diagnostic test, you will need to contact our office to schedule a follow up appointment, as this is when the physician will review your diagnostic test results with you. You can contact our office to schedule appointment by phone at [phone], or you can send a message via [employer] to request an appointment.     Left shoulder MRI ordered today, 11/29/2023

## 2023-11-29 NOTE — PROGRESS NOTES
metFORMIN (GLUCOPHAGE-XR) 500 MG extended release tablet TAKE 1 TABLET BY MOUTH EVERY DAY WITH DINNER 90 tablet 1    hydroCHLOROthiazide (HYDRODIURIL) 25 MG tablet Take 1 tablet by mouth daily 90 tablet 1    cyclobenzaprine (FLEXERIL) 5 MG tablet Take 1-2 tablets by mouth 3 times daily as needed for Muscle spasms 90 tablet 1    montelukast (SINGULAIR) 10 MG tablet Take 1 tablet by mouth daily 90 tablet 3    pravastatin (PRAVACHOL) 40 MG tablet Take 1 tablet by mouth nightly 90 tablet 1    albuterol (PROVENTIL) (2.5 MG/3ML) 0.083% nebulizer solution Inhale 3 mLs into the lungs every 4 hours as needed      azelastine (ASTELIN) 0.1 % nasal spray 1 spray by Nasal route 2 times daily      fluticasone (FLONASE) 50 MCG/ACT nasal spray USE 2 SPRAYS IN EACH NOSTRIL ONCE DAILY      losartan (COZAAR) 25 MG tablet TAKE 1 TABLET BY MOUTH EVERY DAY       No current facility-administered medications for this visit. Allergies   Allergen Reactions    Dog Epithelium Allergy Skin Test Swelling    Ibuprofen      Other reaction(s): Unknown (comments)  She doesn't know exactly why she is not allowed to take it    Lisinopril Cough    Molds & Smuts Itching    Pollen Extract Itching    Prednisone Swelling    Aspirin Nausea And Vomiting       Past Surgical History:   Procedure Laterality Date    ANESTH,SURGERY OF SHOULDER      COLONOSCOPY      TUBAL LIGATION Bilateral        Social History     Socioeconomic History    Marital status:      Spouse name: Not on file    Number of children: Not on file    Years of education: Not on file    Highest education level: Not on file   Occupational History    Not on file   Tobacco Use    Smoking status: Every Day     Packs/day: .5     Types: Cigarettes    Smokeless tobacco: Never   Substance and Sexual Activity    Alcohol use:  Yes     Alcohol/week: 2.0 standard drinks of alcohol    Drug use: Yes     Types: Cocaine    Sexual activity: Not on file   Other Topics Concern    Not on file   Social

## 2023-12-07 ENCOUNTER — PROCEDURE VISIT (OUTPATIENT)
Age: 58
End: 2023-12-07
Payer: MEDICARE

## 2023-12-07 VITALS
WEIGHT: 201 LBS | BODY MASS INDEX: 33.49 KG/M2 | HEART RATE: 87 BPM | OXYGEN SATURATION: 95 % | HEIGHT: 65 IN | DIASTOLIC BLOOD PRESSURE: 68 MMHG | SYSTOLIC BLOOD PRESSURE: 108 MMHG

## 2023-12-07 DIAGNOSIS — R20.0 NUMBNESS AND TINGLING OF RIGHT UPPER EXTREMITY: ICD-10-CM

## 2023-12-07 DIAGNOSIS — R20.2 NUMBNESS AND TINGLING OF RIGHT UPPER EXTREMITY: ICD-10-CM

## 2023-12-07 DIAGNOSIS — R20.0 NUMBNESS AND TINGLING OF LEFT UPPER EXTREMITY: ICD-10-CM

## 2023-12-07 DIAGNOSIS — R20.2 NUMBNESS AND TINGLING OF LEFT UPPER EXTREMITY: ICD-10-CM

## 2023-12-07 PROCEDURE — 95886 MUSC TEST DONE W/N TEST COMP: CPT | Performed by: PHYSICAL MEDICINE & REHABILITATION

## 2023-12-07 PROCEDURE — 95912 NRV CNDJ TEST 11-12 STUDIES: CPT | Performed by: PHYSICAL MEDICINE & REHABILITATION

## 2023-12-15 ENCOUNTER — HOSPITAL ENCOUNTER (OUTPATIENT)
Facility: HOSPITAL | Age: 58
End: 2023-12-15
Attending: ORTHOPAEDIC SURGERY
Payer: MEDICARE

## 2023-12-15 DIAGNOSIS — M19.012 PRIMARY OSTEOARTHRITIS OF LEFT SHOULDER: ICD-10-CM

## 2023-12-15 DIAGNOSIS — Z98.890 H/O REPAIR OF LEFT ROTATOR CUFF: ICD-10-CM

## 2023-12-15 PROCEDURE — 73221 MRI JOINT UPR EXTREM W/O DYE: CPT

## 2023-12-26 ENCOUNTER — CLINICAL DOCUMENTATION (OUTPATIENT)
Facility: CLINIC | Age: 58
End: 2023-12-26

## 2023-12-26 NOTE — PROGRESS NOTES
[facility] [address] [place] [address] that the patient has an appt on 3/20/24 with Dr Margarito Chavis. They requested OV notes, labs and etc.   HILDA is not listed in the care team from what I see; however, According to what I see in [facility] connection she had a colonoscopy done by Dr Reagan Raymundo with [facility] in-in  2021. I have faxed the last OV note of 7/11/23 and labs 5/5/23 and 9/6/23. There are no abdomen related imaging study results to send. Faxed to [phone] as requested.

## 2023-12-26 NOTE — PROGRESS NOTES
Addendum to the gastroenterology /DLDS note dated 12/26/23:  The provider that the patient is seeing is Dr Loreta Hurst
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
## 2024-01-05 ENCOUNTER — OFFICE VISIT (OUTPATIENT)
Age: 59
End: 2024-01-05
Payer: MEDICARE

## 2024-01-05 VITALS — HEIGHT: 65 IN | BODY MASS INDEX: 33.49 KG/M2 | WEIGHT: 201 LBS | TEMPERATURE: 97.5 F

## 2024-01-05 DIAGNOSIS — G56.22 CUBITAL TUNNEL SYNDROME ON LEFT: Primary | ICD-10-CM

## 2024-01-05 DIAGNOSIS — G56.01 CARPAL TUNNEL SYNDROME ON RIGHT: ICD-10-CM

## 2024-01-05 DIAGNOSIS — M19.90 ARTHRITIS: ICD-10-CM

## 2024-01-05 DIAGNOSIS — M75.102 TEAR OF LEFT ROTATOR CUFF, UNSPECIFIED TEAR EXTENT, UNSPECIFIED WHETHER TRAUMATIC: ICD-10-CM

## 2024-01-05 DIAGNOSIS — S43.432A SUPERIOR GLENOID LABRUM LESION OF LEFT SHOULDER, INITIAL ENCOUNTER: ICD-10-CM

## 2024-01-05 DIAGNOSIS — M75.22 BICEPS TENDINITIS OF LEFT SHOULDER: ICD-10-CM

## 2024-01-05 PROCEDURE — 3017F COLORECTAL CA SCREEN DOC REV: CPT | Performed by: SPECIALIST

## 2024-01-05 PROCEDURE — 99213 OFFICE O/P EST LOW 20 MIN: CPT | Performed by: SPECIALIST

## 2024-01-05 PROCEDURE — 4004F PT TOBACCO SCREEN RCVD TLK: CPT | Performed by: SPECIALIST

## 2024-01-05 PROCEDURE — G8484 FLU IMMUNIZE NO ADMIN: HCPCS | Performed by: SPECIALIST

## 2024-01-05 PROCEDURE — G8417 CALC BMI ABV UP PARAM F/U: HCPCS | Performed by: SPECIALIST

## 2024-01-05 PROCEDURE — G8427 DOCREV CUR MEDS BY ELIG CLIN: HCPCS | Performed by: SPECIALIST

## 2024-01-05 RX ORDER — PANTOPRAZOLE SODIUM 40 MG/1
TABLET, DELAYED RELEASE ORAL
COMMUNITY
Start: 2023-11-13

## 2024-01-17 ENCOUNTER — OFFICE VISIT (OUTPATIENT)
Age: 59
End: 2024-01-17
Payer: MEDICARE

## 2024-01-17 VITALS — HEIGHT: 65 IN | RESPIRATION RATE: 18 BRPM | BODY MASS INDEX: 33.45 KG/M2

## 2024-01-17 DIAGNOSIS — M75.112 NONTRAUMATIC INCOMPLETE TEAR OF LEFT ROTATOR CUFF: Primary | ICD-10-CM

## 2024-01-17 DIAGNOSIS — G56.22 CUBITAL TUNNEL SYNDROME ON LEFT: ICD-10-CM

## 2024-01-17 PROCEDURE — G8484 FLU IMMUNIZE NO ADMIN: HCPCS | Performed by: ORTHOPAEDIC SURGERY

## 2024-01-17 PROCEDURE — G8417 CALC BMI ABV UP PARAM F/U: HCPCS | Performed by: ORTHOPAEDIC SURGERY

## 2024-01-17 PROCEDURE — 99214 OFFICE O/P EST MOD 30 MIN: CPT | Performed by: ORTHOPAEDIC SURGERY

## 2024-01-17 PROCEDURE — G8428 CUR MEDS NOT DOCUMENT: HCPCS | Performed by: ORTHOPAEDIC SURGERY

## 2024-01-17 PROCEDURE — 3017F COLORECTAL CA SCREEN DOC REV: CPT | Performed by: ORTHOPAEDIC SURGERY

## 2024-01-17 PROCEDURE — 4004F PT TOBACCO SCREEN RCVD TLK: CPT | Performed by: ORTHOPAEDIC SURGERY

## 2024-01-17 NOTE — PROGRESS NOTES
Topics Concern    Not on file   Social History Narrative    Not on file     Social Determinants of Health     Financial Resource Strain: Low Risk  (7/11/2023)    Overall Financial Resource Strain (CARDIA)     Difficulty of Paying Living Expenses: Not hard at all   Food Insecurity: Not on file (7/11/2023)   Transportation Needs: Unknown (7/11/2023)    PRAPARE - Transportation     Lack of Transportation (Medical): Not on file     Lack of Transportation (Non-Medical): No   Physical Activity: Not on file   Stress: Not on file   Social Connections: Not on file   Intimate Partner Violence: Not on file   Housing Stability: Unknown (7/11/2023)    Housing Stability Vital Sign     Unable to Pay for Housing in the Last Year: Not on file     Number of Places Lived in the Last Year: Not on file     Unstable Housing in the Last Year: No       REVIEW OF SYSTEMS:      No changes from previous review of systems unless noted.    PHYSICAL EXAMINATION:  Resp 18   Ht 1.651 m (5' 5\")   LMP  (LMP Unknown)   BMI 33.45 kg/m²   Body mass index is 33.45 kg/m².  GENERAL: Alert and oriented x3, in no acute distress.  HEENT: Normocephalic, atraumatic.    Shoulder Examination     R   L  ROM   FF  Full   Full  ER  Full   Full   IR  Full   Full  Rotator Cuff Pain/Weakness   Supra  -   +   Infra  -   -   Subscap -   -  Crepitus  -   -  Effusion  -   -  Warmth  -   -   Erythema  -   -  Instability  -   -  AC Joint TTP  -   -  Clavicle   Deformity -   -   TTP  -   -  Proximal Humerus   Deformity -   -   TTP  -   -  Deltoid Strength 5   5  Biceps Strength 5   5  Biceps Deformity -   -  Biceps Groove Pain -   -  Impingement Sign -   -     Elbow Exam   R   L  ROM       Flexion   Full   Full   Extension  Full   Full   Pronation  Full   Full   Supination  Full   Full  Tenderness to palpation   Medial Epicondyle -   -   Lateral Epicondyle -   -  Tinel Sign Cubital Tunnel -   +  Ulnar Nerve Subluxation -   -  Distal Biceps Abnormality -   -  Triceps

## 2024-02-12 DIAGNOSIS — M75.112 NONTRAUMATIC INCOMPLETE TEAR OF LEFT ROTATOR CUFF: Primary | ICD-10-CM

## 2024-02-12 DIAGNOSIS — Z01.810 PREOP CARDIOVASCULAR EXAM: ICD-10-CM

## 2024-02-12 DIAGNOSIS — Z01.818 PREOPERATIVE TESTING: ICD-10-CM

## 2024-02-14 DIAGNOSIS — I10 ESSENTIAL HYPERTENSION WITH GOAL BLOOD PRESSURE LESS THAN 130/80: ICD-10-CM

## 2024-02-14 DIAGNOSIS — F51.01 PRIMARY INSOMNIA: ICD-10-CM

## 2024-02-14 RX ORDER — HYDROCHLOROTHIAZIDE 25 MG/1
25 TABLET ORAL DAILY
Qty: 90 TABLET | Refills: 1 | OUTPATIENT
Start: 2024-02-14

## 2024-02-14 RX ORDER — TRAZODONE HYDROCHLORIDE 150 MG/1
150 TABLET ORAL NIGHTLY
Qty: 90 TABLET | Refills: 1 | OUTPATIENT
Start: 2024-02-14

## 2024-02-21 DIAGNOSIS — Z01.818 PREOPERATIVE TESTING: ICD-10-CM

## 2024-02-21 DIAGNOSIS — M75.112 NONTRAUMATIC INCOMPLETE TEAR OF LEFT ROTATOR CUFF: Primary | ICD-10-CM

## 2024-02-21 DIAGNOSIS — Z01.810 PREOP CARDIOVASCULAR EXAM: ICD-10-CM

## 2024-02-21 DIAGNOSIS — M75.112 NONTRAUMATIC INCOMPLETE TEAR OF LEFT ROTATOR CUFF: ICD-10-CM

## 2024-04-05 ENCOUNTER — TELEPHONE (OUTPATIENT)
Facility: CLINIC | Age: 59
End: 2024-04-05

## 2024-04-05 NOTE — TELEPHONE ENCOUNTER
Left voicemail on 4/5/24 at 1430 for patient to call the to schedule a lab appointment and MAWV with PCP.

## 2024-04-17 ENCOUNTER — OFFICE VISIT (OUTPATIENT)
Age: 59
End: 2024-04-17
Payer: MEDICARE

## 2024-04-17 VITALS — BODY MASS INDEX: 32.78 KG/M2 | RESPIRATION RATE: 16 BRPM | HEIGHT: 65 IN

## 2024-04-17 DIAGNOSIS — G56.01 CARPAL TUNNEL SYNDROME ON RIGHT: Primary | ICD-10-CM

## 2024-04-17 DIAGNOSIS — G56.22 CUBITAL TUNNEL SYNDROME ON LEFT: ICD-10-CM

## 2024-04-17 DIAGNOSIS — M75.112 NONTRAUMATIC INCOMPLETE TEAR OF LEFT ROTATOR CUFF: ICD-10-CM

## 2024-04-17 PROCEDURE — 4004F PT TOBACCO SCREEN RCVD TLK: CPT | Performed by: ORTHOPAEDIC SURGERY

## 2024-04-17 PROCEDURE — 99214 OFFICE O/P EST MOD 30 MIN: CPT | Performed by: ORTHOPAEDIC SURGERY

## 2024-04-17 PROCEDURE — G8417 CALC BMI ABV UP PARAM F/U: HCPCS | Performed by: ORTHOPAEDIC SURGERY

## 2024-04-17 PROCEDURE — 3017F COLORECTAL CA SCREEN DOC REV: CPT | Performed by: ORTHOPAEDIC SURGERY

## 2024-04-17 PROCEDURE — G8428 CUR MEDS NOT DOCUMENT: HCPCS | Performed by: ORTHOPAEDIC SURGERY

## 2024-04-17 NOTE — PROGRESS NOTES
DAILY. 200 g 5    buPROPion (WELLBUTRIN XL) 150 MG extended release tablet Take 1 tablet by mouth daily 90 tablet 1    ketoconazole (NIZORAL) 2 % cream Apply topically 2 times daily Apply topically daily. 30 g 1    metFORMIN (GLUCOPHAGE-XR) 500 MG extended release tablet TAKE 1 TABLET BY MOUTH EVERY DAY WITH DINNER (Patient taking differently: Take 1 tablet by mouth daily (with breakfast) TAKE 1 TABLET BY MOUTH EVERY DAY WITH DINNER) 90 tablet 1    montelukast (SINGULAIR) 10 MG tablet Take 1 tablet by mouth daily 90 tablet 3    pravastatin (PRAVACHOL) 40 MG tablet Take 1 tablet by mouth nightly 90 tablet 1    albuterol (PROVENTIL) (2.5 MG/3ML) 0.083% nebulizer solution Inhale 3 mLs into the lungs every 4 hours as needed      azelastine (ASTELIN) 0.1 % nasal spray 1 spray by Nasal route 2 times daily      fluticasone (FLONASE) 50 MCG/ACT nasal spray USE 2 SPRAYS IN EACH NOSTRIL ONCE DAILY      losartan (COZAAR) 25 MG tablet TAKE 1 TABLET BY MOUTH EVERY DAY       No current facility-administered medications for this visit.       Allergies   Allergen Reactions    Dog Epithelium (Canis Lupus Familiaris) Swelling    Ibuprofen      Other reaction(s): Unknown (comments)  She doesn't know exactly why she is not allowed to take it    Lisinopril Cough    Molds & Smuts Itching    Pollen Extract Itching    Prednisone Swelling    Aspirin Nausea And Vomiting       Past Surgical History:   Procedure Laterality Date    ANESTH,SURGERY OF SHOULDER Right     rotator cuff    COLONOSCOPY      TUBAL LIGATION Bilateral        Social History     Socioeconomic History    Marital status:      Spouse name: Not on file    Number of children: Not on file    Years of education: Not on file    Highest education level: Not on file   Occupational History    Not on file   Tobacco Use    Smoking status: Every Day     Current packs/day: 0.25     Types: Cigarettes    Smokeless tobacco: Never   Vaping Use    Vaping Use: Former   Substance and

## 2024-04-22 ENCOUNTER — PATIENT MESSAGE (OUTPATIENT)
Dept: OTHER | Facility: CLINIC | Age: 59
End: 2024-04-22

## 2024-04-25 ENCOUNTER — TELEPHONE (OUTPATIENT)
Facility: CLINIC | Age: 59
End: 2024-04-25

## 2024-04-26 NOTE — TELEPHONE ENCOUNTER
Yes , [facility] paperwork received. Will process and fax back to [facility] once  Paperwork has been  reviewed by Mandy Humphries NP

## 2024-05-10 ENCOUNTER — OFFICE VISIT (OUTPATIENT)
Age: 59
End: 2024-05-10
Payer: MEDICARE

## 2024-05-10 VITALS
WEIGHT: 194 LBS | SYSTOLIC BLOOD PRESSURE: 121 MMHG | HEIGHT: 64 IN | BODY MASS INDEX: 33.12 KG/M2 | DIASTOLIC BLOOD PRESSURE: 86 MMHG

## 2024-05-10 DIAGNOSIS — G56.02 LEFT CARPAL TUNNEL SYNDROME: ICD-10-CM

## 2024-05-10 DIAGNOSIS — Z01.818 PREOP EXAMINATION: Primary | ICD-10-CM

## 2024-05-10 DIAGNOSIS — G56.01 RIGHT CARPAL TUNNEL SYNDROME: ICD-10-CM

## 2024-05-10 DIAGNOSIS — G56.22 CUBITAL TUNNEL SYNDROME, LEFT: ICD-10-CM

## 2024-05-10 DIAGNOSIS — G56.22 CUBITAL TUNNEL SYNDROME, LEFT: Primary | ICD-10-CM

## 2024-05-10 PROCEDURE — G8417 CALC BMI ABV UP PARAM F/U: HCPCS | Performed by: ORTHOPAEDIC SURGERY

## 2024-05-10 PROCEDURE — G8427 DOCREV CUR MEDS BY ELIG CLIN: HCPCS | Performed by: ORTHOPAEDIC SURGERY

## 2024-05-10 PROCEDURE — 3079F DIAST BP 80-89 MM HG: CPT | Performed by: ORTHOPAEDIC SURGERY

## 2024-05-10 PROCEDURE — 99214 OFFICE O/P EST MOD 30 MIN: CPT | Performed by: ORTHOPAEDIC SURGERY

## 2024-05-10 PROCEDURE — 3074F SYST BP LT 130 MM HG: CPT | Performed by: ORTHOPAEDIC SURGERY

## 2024-05-10 PROCEDURE — 4004F PT TOBACCO SCREEN RCVD TLK: CPT | Performed by: ORTHOPAEDIC SURGERY

## 2024-05-10 PROCEDURE — 3017F COLORECTAL CA SCREEN DOC REV: CPT | Performed by: ORTHOPAEDIC SURGERY

## 2024-05-10 NOTE — PROGRESS NOTES
Jaspal Sabillon is a 58 y.o. female right handed.  Worker's Compensation and legal considerations: none    Chief Complaint   Patient presents with    Hand Pain     bilateral     Pain Score:   8    HPI: Patient presents today with complaints of bilateral hand pain and numbness and tingling.  She reports left side to be worse than the right.  On the left she mostly is numbness and tingling in the little and ring finger and occasionally in the thumb and index finger.  On the right side she occasionally has numbness and tingling in the thumb index and middle fingers.  She has recently had an EMG.    Date of onset: 2023  Injury: No  Prior Treatment:  No    ROS: Review of Systems - General ROS: negative except HPI    Past Medical History:   Diagnosis Date    Allergic rhinitis     Anxiety and depression     Asthma 11/3/2010    Chronic pain in right shoulder     Depression 10/19/2015    Diabetes (HCC)     Eczema 10/19/2015    Head pain 4/28/2014    HTN (hypertension)     Multiple environmental allergies     Nicotine dependence     Syphilis in female 3/2/2015       Past Surgical History:   Procedure Laterality Date    ANESTH,SURGERY OF SHOULDER Right     rotator cuff    COLONOSCOPY      TUBAL LIGATION Bilateral         Current Outpatient Medications   Medication Sig Dispense Refill    traZODone (DESYREL) 100 MG tablet Take 1 tablet by mouth nightly      escitalopram (LEXAPRO) 10 MG tablet Take 1 tablet by mouth daily      cloNIDine (CATAPRES) 0.1 MG tablet Take 1 tablet by mouth daily as needed      hydroCHLOROthiazide 12.5 MG tablet Take 1 tablet by mouth daily      fluticasone-salmeterol (ADVAIR) 100-50 MCG/ACT AEPB diskus inhaler Inhale 1 puff into the lungs 2 times daily      pantoprazole (PROTONIX) 40 MG tablet TAKE 1 TABLET BY MOUTH TWICE A DAY 30-60 MIN BEFORE BREAKFAST      albuterol sulfate HFA (PROVENTIL;VENTOLIN;PROAIR) 108 (90 Base) MCG/ACT inhaler INHALE 2 PUFFS EVERY 6 HOURS AS NEEDED FOR WHEEZE 18 g 1

## 2024-05-17 LAB
A/G RATIO: 1.7 RATIO (ref 1.1–2.6)
ALBUMIN: 4.2 G/DL (ref 3.5–5)
ALP BLD-CCNC: 85 U/L (ref 25–115)
ALT SERPL-CCNC: 16 U/L (ref 5–40)
ANION GAP SERPL CALCULATED.3IONS-SCNC: 9 MMOL/L (ref 3–15)
AST SERPL-CCNC: 19 U/L (ref 10–37)
BASOPHILS ABSOLUTE: 0 K/UL (ref 0–0.2)
BASOPHILS RELATIVE PERCENT: 0 % (ref 0–2)
BILIRUB SERPL-MCNC: 0.4 MG/DL (ref 0.2–1.2)
BUN BLDV-MCNC: 13 MG/DL (ref 6–22)
CALCIUM SERPL-MCNC: 9.5 MG/DL (ref 8.4–10.5)
CHLORIDE BLD-SCNC: 103 MMOL/L (ref 98–110)
CO2: 28 MMOL/L (ref 20–32)
CREAT SERPL-MCNC: 0.8 MG/DL (ref 0.5–1.2)
EOSINOPHIL # BLD: 1 % (ref 0–6)
EOSINOPHILS ABSOLUTE: 0.2 K/UL (ref 0–0.5)
GFR, ESTIMATED: >60 ML/MIN/1.73 SQ.M.
GLOBULIN: 2.5 G/DL (ref 2–4)
GLUCOSE: 83 MG/DL (ref 70–99)
HCT VFR BLD CALC: 43.3 % (ref 35.1–48)
HEMOGLOBIN: 13.6 G/DL (ref 11.7–16)
LYMPHOCYTES # BLD: 19 % (ref 20–45)
LYMPHOCYTES ABSOLUTE: 2.2 K/UL (ref 1–4.8)
MCH RBC QN AUTO: 26 PG (ref 26–34)
MCHC RBC AUTO-ENTMCNC: 31 G/DL (ref 31–36)
MCV RBC AUTO: 83 FL (ref 80–99)
MONOCYTES ABSOLUTE: 1 K/UL (ref 0.1–1)
MONOCYTES: 9 % (ref 3–12)
NEUTROPHILS ABSOLUTE: 8.2 K/UL (ref 1.8–7.7)
NEUTROPHILS: 71 % (ref 40–75)
PDW BLD-RTO: 14.7 % (ref 10–15.5)
PLATELET # BLD: 315 K/UL (ref 140–440)
PMV BLD AUTO: 9.7 FL (ref 9–13)
POTASSIUM SERPL-SCNC: 3.9 MMOL/L (ref 3.5–5.5)
RBC # BLD: 5.2 M/UL (ref 3.8–5.2)
SODIUM BLD-SCNC: 140 MMOL/L (ref 133–145)
TOTAL PROTEIN: 6.7 G/DL (ref 6.4–8.3)
WBC # BLD: 11.6 K/UL (ref 4–11)

## 2024-06-03 ENCOUNTER — CLINICAL DOCUMENTATION (OUTPATIENT)
Age: 59
End: 2024-06-03

## 2024-06-03 NOTE — PROGRESS NOTES
Spoke to patient who needed to cancel her surgery for 6/5 because she has court and may have to move out of her home by the end of the week.      PO cancelled. Patient removed from surgery calendar.  Patient advised to make an appt when she is ready to reschedule.

## 2024-06-05 ENCOUNTER — TELEPHONE (OUTPATIENT)
Facility: CLINIC | Age: 59
End: 2024-06-05

## 2024-09-30 ENCOUNTER — TELEPHONE (OUTPATIENT)
Facility: CLINIC | Age: 59
End: 2024-09-30

## 2024-09-30 NOTE — TELEPHONE ENCOUNTER
Robyn Turcios will be sending a fax that was sent in August for Rx refill  on a group of patient's medications.

## 2024-10-03 NOTE — TELEPHONE ENCOUNTER
Called patient several times to schedule an appointment . Patient did not answer . Left message stating to call the office back .

## (undated) DEVICE — (D)PREP SKN CHLRAPRP APPL 26ML -- CONVERT TO ITEM 371833

## (undated) DEVICE — DRSG GZ OIL EMUL CURAD 3X3 --

## (undated) DEVICE — 3M™ STERI-DRAPE™ U-DRAPE 1015: Brand: STERI-DRAPE™

## (undated) DEVICE — SHOULDER STABILIZATION KIT,                                    DISPOSABLE 12 PER BOX

## (undated) DEVICE — SPONGE LAP 18X18IN STRL -- 5/PK

## (undated) DEVICE — BLANKET WRM AD W50XL85.8IN PACU FULL BODY FORC AIR

## (undated) DEVICE — PREP SKN CHLRAPRP APL 26ML STR --

## (undated) DEVICE — KIT CLN UP BON SECOURS MARYV

## (undated) DEVICE — 4-PORT MANIFOLD: Brand: NEPTUNE 2

## (undated) DEVICE — BLANKET WRM W40.2XL55.9IN IORT LO BODY + MISTRAL AIR

## (undated) DEVICE — INFLOW CASSETTE TUBING, DO NOT USE IF PACKAGE IS DAMAGED: Brand: CROSSFLOW

## (undated) DEVICE — GARMENT,MEDLINE,DVT,INT,CALF,MED, GEN2: Brand: MEDLINE

## (undated) DEVICE — [ARTHROSCOPY PUMP,  DO NOT USE IF PACKAGE IS DAMAGED,  KEEP DRY,  KEEP AWAY FROM SUNLIGHT,  PROTECT FROM HEAT AND RADIOACTIVE SOURCES.]: Brand: FLOSTEADY

## (undated) DEVICE — Device

## (undated) DEVICE — STERILE POLYISOPRENE POWDER-FREE SURGICAL GLOVES: Brand: PROTEXIS

## (undated) DEVICE — BLADE SHV 4.0MM TOMCAT --

## (undated) DEVICE — SOLUTION IRRIG 3000ML 0.9% SOD CHL FLX CONT 0797208] ICU MEDICAL INC]

## (undated) DEVICE — CANNULA THREADED FLEX 8.0 X 72MM: Brand: CLEAR-TRAC

## (undated) DEVICE — SUTURE ETHLN SZ 2-0 L18IN NONABSORBABLE BLK L19MM PS-2 PRIM 593H

## (undated) DEVICE — 90-S MAX, SUCTION PROBE, NON-BENDABLE, MAX CUT LEVEL 11: Brand: SERFAS ENERGY

## (undated) DEVICE — BUR SHV CUT HLLW 6 FLUT 5.5MM --

## (undated) DEVICE — DISPOSABLE MULTI BAG ADAPTERS Y                                    TUBING, STERILE, 2 TO A SET 6 SETS                                    PER BOX

## (undated) DEVICE — BANDAGE,GAUZE,BULKEE II,4.5"X4.1YD,STRL: Brand: MEDLINE

## (undated) DEVICE — T-MAX DISPOSABLE FACE MASK 8 PER BOX

## (undated) DEVICE — DRAPE,REIN 53X77,STERILE: Brand: MEDLINE

## (undated) DEVICE — 1010 S-DRAPE TOWEL DRAPE 10/BX: Brand: STERI-DRAPE™

## (undated) DEVICE — NEEDLE NRV BLK 21GA L4IN 30DEG INSUL BVL EXTN SET STIMUPLEX